# Patient Record
Sex: MALE | Race: WHITE | NOT HISPANIC OR LATINO | Employment: OTHER | URBAN - METROPOLITAN AREA
[De-identification: names, ages, dates, MRNs, and addresses within clinical notes are randomized per-mention and may not be internally consistent; named-entity substitution may affect disease eponyms.]

---

## 2017-10-07 ENCOUNTER — APPOINTMENT (OUTPATIENT)
Dept: GENERAL RADIOLOGY | Facility: HOSPITAL | Age: 57
End: 2017-10-07

## 2017-10-07 ENCOUNTER — HOSPITAL ENCOUNTER (INPATIENT)
Facility: HOSPITAL | Age: 57
LOS: 3 days | Discharge: ANOTHER HOSPITAL | End: 2017-10-11
Attending: EMERGENCY MEDICINE | Admitting: FAMILY MEDICINE

## 2017-10-07 DIAGNOSIS — M06.9 RHEUMATOID ARTHRITIS FLARE (HCC): Primary | ICD-10-CM

## 2017-10-07 DIAGNOSIS — Z74.09 IMPAIRED FUNCTIONAL MOBILITY, BALANCE, GAIT, AND ENDURANCE: ICD-10-CM

## 2017-10-07 DIAGNOSIS — Z74.09 IMPAIRED MOBILITY AND ADLS: ICD-10-CM

## 2017-10-07 DIAGNOSIS — M00.9: ICD-10-CM

## 2017-10-07 DIAGNOSIS — R00.0 TACHYCARDIA: ICD-10-CM

## 2017-10-07 DIAGNOSIS — Z78.9 IMPAIRED MOBILITY AND ADLS: ICD-10-CM

## 2017-10-07 LAB
ALBUMIN SERPL-MCNC: 3.5 G/DL (ref 3.2–4.8)
ALBUMIN/GLOB SERPL: 1 G/DL (ref 1.5–2.5)
ALP SERPL-CCNC: 115 U/L (ref 25–100)
ALT SERPL W P-5'-P-CCNC: 100 U/L (ref 7–40)
ANION GAP SERPL CALCULATED.3IONS-SCNC: 7 MMOL/L (ref 3–11)
APTT PPP: 26.1 SECONDS (ref 24–31)
AST SERPL-CCNC: 42 U/L (ref 0–33)
BASOPHILS # BLD AUTO: 0.03 10*3/MM3 (ref 0–0.2)
BASOPHILS NFR BLD AUTO: 0.6 % (ref 0–1)
BILIRUB SERPL-MCNC: 0.4 MG/DL (ref 0.3–1.2)
BUN BLD-MCNC: 14 MG/DL (ref 9–23)
BUN/CREAT SERPL: 20 (ref 7–25)
CALCIUM SPEC-SCNC: 9.6 MG/DL (ref 8.7–10.4)
CHLORIDE SERPL-SCNC: 106 MMOL/L (ref 99–109)
CO2 SERPL-SCNC: 24 MMOL/L (ref 20–31)
CREAT BLD-MCNC: 0.7 MG/DL (ref 0.6–1.3)
CRP SERPL-MCNC: 9.22 MG/DL (ref 0–1)
D-LACTATE SERPL-SCNC: 1.5 MMOL/L (ref 0.5–2)
DEPRECATED RDW RBC AUTO: 50.6 FL (ref 37–54)
EOSINOPHIL # BLD AUTO: 0.09 10*3/MM3 (ref 0–0.3)
EOSINOPHIL NFR BLD AUTO: 1.8 % (ref 0–3)
ERYTHROCYTE [DISTWIDTH] IN BLOOD BY AUTOMATED COUNT: 13.9 % (ref 11.3–14.5)
ERYTHROCYTE [SEDIMENTATION RATE] IN BLOOD: 95 MM/HR (ref 0–20)
GFR SERPL CREATININE-BSD FRML MDRD: 117 ML/MIN/1.73
GLOBULIN UR ELPH-MCNC: 3.5 GM/DL
GLUCOSE BLD-MCNC: 137 MG/DL (ref 70–100)
HCT VFR BLD AUTO: 43.3 % (ref 38.9–50.9)
HGB BLD-MCNC: 14.9 G/DL (ref 13.1–17.5)
HOLD SPECIMEN: NORMAL
HOLD SPECIMEN: NORMAL
IMM GRANULOCYTES # BLD: 0.03 10*3/MM3 (ref 0–0.03)
IMM GRANULOCYTES NFR BLD: 0.6 % (ref 0–0.6)
INR PPP: 1.1
LYMPHOCYTES # BLD AUTO: 1.52 10*3/MM3 (ref 0.6–4.8)
LYMPHOCYTES NFR BLD AUTO: 30.8 % (ref 24–44)
MCH RBC QN AUTO: 34.6 PG (ref 27–31)
MCHC RBC AUTO-ENTMCNC: 34.4 G/DL (ref 32–36)
MCV RBC AUTO: 100.5 FL (ref 80–99)
MONOCYTES # BLD AUTO: 0.71 10*3/MM3 (ref 0–1)
MONOCYTES NFR BLD AUTO: 14.4 % (ref 0–12)
NEUTROPHILS # BLD AUTO: 2.56 10*3/MM3 (ref 1.5–8.3)
NEUTROPHILS NFR BLD AUTO: 51.8 % (ref 41–71)
NRBC BLD MANUAL-RTO: 0 /100 WBC (ref 0–0)
PLATELET # BLD AUTO: 347 10*3/MM3 (ref 150–450)
PMV BLD AUTO: 9.4 FL (ref 6–12)
POTASSIUM BLD-SCNC: 3.4 MMOL/L (ref 3.5–5.5)
PROCALCITONIN SERPL-MCNC: 0.16 NG/ML
PROT SERPL-MCNC: 7 G/DL (ref 5.7–8.2)
PROTHROMBIN TIME: 12 SECONDS (ref 9.6–11.5)
RBC # BLD AUTO: 4.31 10*6/MM3 (ref 4.2–5.76)
SODIUM BLD-SCNC: 137 MMOL/L (ref 132–146)
TROPONIN I SERPL-MCNC: 0.03 NG/ML (ref 0–0.07)
URATE SERPL-MCNC: 5.5 MG/DL (ref 3.7–9.2)
WBC NRBC COR # BLD: 4.94 10*3/MM3 (ref 3.5–10.8)
WHOLE BLOOD HOLD SPECIMEN: NORMAL
WHOLE BLOOD HOLD SPECIMEN: NORMAL

## 2017-10-07 PROCEDURE — 80053 COMPREHEN METABOLIC PANEL: CPT | Performed by: NURSE PRACTITIONER

## 2017-10-07 PROCEDURE — 86901 BLOOD TYPING SEROLOGIC RH(D): CPT

## 2017-10-07 PROCEDURE — 83605 ASSAY OF LACTIC ACID: CPT | Performed by: NURSE PRACTITIONER

## 2017-10-07 PROCEDURE — 93005 ELECTROCARDIOGRAM TRACING: CPT | Performed by: NURSE PRACTITIONER

## 2017-10-07 PROCEDURE — 89051 BODY FLUID CELL COUNT: CPT | Performed by: EMERGENCY MEDICINE

## 2017-10-07 PROCEDURE — 84484 ASSAY OF TROPONIN QUANT: CPT

## 2017-10-07 PROCEDURE — 86900 BLOOD TYPING SEROLOGIC ABO: CPT

## 2017-10-07 PROCEDURE — 85730 THROMBOPLASTIN TIME PARTIAL: CPT | Performed by: NURSE PRACTITIONER

## 2017-10-07 PROCEDURE — 85652 RBC SED RATE AUTOMATED: CPT | Performed by: NURSE PRACTITIONER

## 2017-10-07 PROCEDURE — 86140 C-REACTIVE PROTEIN: CPT | Performed by: NURSE PRACTITIONER

## 2017-10-07 PROCEDURE — 99283 EMERGENCY DEPT VISIT LOW MDM: CPT

## 2017-10-07 PROCEDURE — 87040 BLOOD CULTURE FOR BACTERIA: CPT | Performed by: NURSE PRACTITIONER

## 2017-10-07 PROCEDURE — 85025 COMPLETE CBC W/AUTO DIFF WBC: CPT | Performed by: NURSE PRACTITIONER

## 2017-10-07 PROCEDURE — 84550 ASSAY OF BLOOD/URIC ACID: CPT | Performed by: NURSE PRACTITIONER

## 2017-10-07 PROCEDURE — 85610 PROTHROMBIN TIME: CPT | Performed by: NURSE PRACTITIONER

## 2017-10-07 PROCEDURE — 89060 EXAM SYNOVIAL FLUID CRYSTALS: CPT | Performed by: EMERGENCY MEDICINE

## 2017-10-07 PROCEDURE — 71010 HC CHEST PA OR AP: CPT

## 2017-10-07 PROCEDURE — 84145 PROCALCITONIN (PCT): CPT | Performed by: NURSE PRACTITIONER

## 2017-10-07 RX ORDER — ESOMEPRAZOLE MAGNESIUM 40 MG/1
40 CAPSULE, DELAYED RELEASE ORAL
COMMUNITY

## 2017-10-07 RX ORDER — SALSALATE 750 MG
750 TABLET ORAL 2 TIMES DAILY
COMMUNITY
End: 2017-10-11 | Stop reason: HOSPADM

## 2017-10-07 RX ORDER — LORATADINE 10 MG/1
10 TABLET ORAL DAILY
COMMUNITY

## 2017-10-07 RX ORDER — AMITRIPTYLINE HYDROCHLORIDE 75 MG/1
TABLET, FILM COATED ORAL NIGHTLY
COMMUNITY

## 2017-10-07 RX ORDER — ATORVASTATIN CALCIUM 40 MG/1
40 TABLET, FILM COATED ORAL DAILY
COMMUNITY

## 2017-10-07 RX ORDER — VANCOMYCIN 2 GRAM/500 ML IN 0.9 % SODIUM CHLORIDE INTRAVENOUS
20 ONCE
Status: COMPLETED | OUTPATIENT
Start: 2017-10-07 | End: 2017-10-08

## 2017-10-07 RX ORDER — RANITIDINE 150 MG/1
150 TABLET ORAL 2 TIMES DAILY
COMMUNITY

## 2017-10-07 RX ORDER — DILTIAZEM HYDROCHLORIDE 120 MG/1
120 CAPSULE, COATED, EXTENDED RELEASE ORAL DAILY
COMMUNITY

## 2017-10-07 RX ORDER — CLOPIDOGREL BISULFATE 75 MG/1
75 TABLET ORAL DAILY
COMMUNITY
End: 2017-10-11 | Stop reason: HOSPADM

## 2017-10-07 RX ORDER — TRAMADOL HYDROCHLORIDE 50 MG/1
50 TABLET ORAL EVERY 6 HOURS PRN
COMMUNITY
End: 2017-10-11 | Stop reason: HOSPADM

## 2017-10-07 RX ORDER — PREDNISONE 1 MG/1
5 TABLET ORAL DAILY
COMMUNITY
End: 2017-10-11 | Stop reason: HOSPADM

## 2017-10-07 RX ORDER — CARVEDILOL PHOSPHATE 40 MG/1
40 CAPSULE, EXTENDED RELEASE ORAL DAILY
COMMUNITY
End: 2017-10-11 | Stop reason: HOSPADM

## 2017-10-07 RX ORDER — SODIUM CHLORIDE 0.9 % (FLUSH) 0.9 %
10 SYRINGE (ML) INJECTION AS NEEDED
Status: DISCONTINUED | OUTPATIENT
Start: 2017-10-07 | End: 2017-10-11 | Stop reason: HOSPADM

## 2017-10-07 RX ORDER — CITALOPRAM 20 MG/1
20 TABLET ORAL DAILY
COMMUNITY

## 2017-10-07 RX ORDER — COLCHICINE 0.6 MG/1
0.6 TABLET ORAL DAILY
COMMUNITY
End: 2017-10-11 | Stop reason: HOSPADM

## 2017-10-07 RX ADMIN — SODIUM CHLORIDE 1000 ML: 9 INJECTION, SOLUTION INTRAVENOUS at 22:07

## 2017-10-08 ENCOUNTER — ANESTHESIA (OUTPATIENT)
Dept: PERIOP | Facility: HOSPITAL | Age: 57
End: 2017-10-08

## 2017-10-08 ENCOUNTER — APPOINTMENT (OUTPATIENT)
Dept: GENERAL RADIOLOGY | Facility: HOSPITAL | Age: 57
End: 2017-10-08

## 2017-10-08 ENCOUNTER — ANESTHESIA EVENT (OUTPATIENT)
Dept: PERIOP | Facility: HOSPITAL | Age: 57
End: 2017-10-08

## 2017-10-08 PROBLEM — R41.82 ALTERED MENTAL STATUS: Status: ACTIVE | Noted: 2017-10-08

## 2017-10-08 PROBLEM — M00.9 PYOGENIC ARTHRITIS OF MULTIPLE SITES (HCC): Status: ACTIVE | Noted: 2017-10-07

## 2017-10-08 PROBLEM — I10 HYPERTENSION: Chronic | Status: ACTIVE | Noted: 2017-10-08

## 2017-10-08 PROBLEM — R53.1 WEAKNESS: Status: ACTIVE | Noted: 2017-10-08

## 2017-10-08 PROBLEM — R00.0 TACHYCARDIA: Status: ACTIVE | Noted: 2017-10-08

## 2017-10-08 PROBLEM — L53.9: Status: ACTIVE | Noted: 2017-10-08

## 2017-10-08 PROBLEM — M06.9 RHEUMATOID ARTHRITIS FLARE (HCC): Status: ACTIVE | Noted: 2017-10-08

## 2017-10-08 PROBLEM — I25.10 CAD (CORONARY ARTERY DISEASE): Chronic | Status: ACTIVE | Noted: 2017-10-08

## 2017-10-08 PROBLEM — L53.9 ERYTHEMATOUS CONDITION: Status: ACTIVE | Noted: 2017-10-08

## 2017-10-08 PROBLEM — Z74.09 IMMOBILITY: Status: ACTIVE | Noted: 2017-10-08

## 2017-10-08 PROBLEM — M25.50 JOINT PAIN: Status: ACTIVE | Noted: 2017-10-08

## 2017-10-08 PROBLEM — E78.5 HLD (HYPERLIPIDEMIA): Chronic | Status: ACTIVE | Noted: 2017-10-08

## 2017-10-08 PROBLEM — M06.9 RHEUMATOID ARTHRITIS (HCC): Chronic | Status: ACTIVE | Noted: 2017-10-08

## 2017-10-08 PROBLEM — M10.9 GOUT: Chronic | Status: ACTIVE | Noted: 2017-10-08

## 2017-10-08 LAB
ABO GROUP BLD: NORMAL
ABO GROUP BLD: NORMAL
ALBUMIN SERPL-MCNC: 3.1 G/DL (ref 3.2–4.8)
ALBUMIN/GLOB SERPL: 1.1 G/DL (ref 1.5–2.5)
ALP SERPL-CCNC: 93 U/L (ref 25–100)
ALT SERPL W P-5'-P-CCNC: 92 U/L (ref 7–40)
ANION GAP SERPL CALCULATED.3IONS-SCNC: 10 MMOL/L (ref 3–11)
ANION GAP SERPL CALCULATED.3IONS-SCNC: 4 MMOL/L (ref 3–11)
APPEARANCE FLD: ABNORMAL
AST SERPL-CCNC: 40 U/L (ref 0–33)
BASOPHILS # BLD AUTO: 0.02 10*3/MM3 (ref 0–0.2)
BASOPHILS NFR BLD AUTO: 0.4 % (ref 0–1)
BILIRUB SERPL-MCNC: 0.4 MG/DL (ref 0.3–1.2)
BLD GP AB SCN SERPL QL: NEGATIVE
BNP SERPL-MCNC: 5 PG/ML (ref 0–100)
BUN BLD-MCNC: 15 MG/DL (ref 9–23)
BUN BLD-MCNC: 16 MG/DL (ref 9–23)
BUN/CREAT SERPL: 21.4 (ref 7–25)
BUN/CREAT SERPL: 22.9 (ref 7–25)
CALCIUM SPEC-SCNC: 8.5 MG/DL (ref 8.7–10.4)
CALCIUM SPEC-SCNC: 9.1 MG/DL (ref 8.7–10.4)
CHLORIDE SERPL-SCNC: 104 MMOL/L (ref 99–109)
CHLORIDE SERPL-SCNC: 109 MMOL/L (ref 99–109)
CO2 SERPL-SCNC: 23 MMOL/L (ref 20–31)
CO2 SERPL-SCNC: 25 MMOL/L (ref 20–31)
COLOR FLD: ABNORMAL
COLOR FLD: ABNORMAL
COLOR FLD: YELLOW
CREAT BLD-MCNC: 0.7 MG/DL (ref 0.6–1.3)
CREAT BLD-MCNC: 0.7 MG/DL (ref 0.6–1.3)
CRYSTALS FLD MICRO: NORMAL
D-LACTATE SERPL-SCNC: 0.9 MMOL/L (ref 0.5–2)
DEPRECATED RDW RBC AUTO: 51.3 FL (ref 37–54)
EOSINOPHIL # BLD AUTO: 0.14 10*3/MM3 (ref 0–0.3)
EOSINOPHIL NFR BLD AUTO: 2.8 % (ref 0–3)
ERYTHROCYTE [DISTWIDTH] IN BLOOD BY AUTOMATED COUNT: 14 % (ref 11.3–14.5)
GFR SERPL CREATININE-BSD FRML MDRD: 117 ML/MIN/1.73
GFR SERPL CREATININE-BSD FRML MDRD: 117 ML/MIN/1.73
GLOBULIN UR ELPH-MCNC: 2.7 GM/DL
GLUCOSE BLD-MCNC: 101 MG/DL (ref 70–100)
GLUCOSE BLD-MCNC: 86 MG/DL (ref 70–100)
HBA1C MFR BLD: 5.8 % (ref 4.8–5.6)
HCT VFR BLD AUTO: 36.7 % (ref 38.9–50.9)
HCT VFR BLD AUTO: 38 % (ref 38.9–50.9)
HGB BLD-MCNC: 12.3 G/DL (ref 13.1–17.5)
HGB BLD-MCNC: 12.8 G/DL (ref 13.1–17.5)
IMM GRANULOCYTES # BLD: 0.03 10*3/MM3 (ref 0–0.03)
IMM GRANULOCYTES NFR BLD: 0.6 % (ref 0–0.6)
LYMPHOCYTES # BLD AUTO: 1.52 10*3/MM3 (ref 0.6–4.8)
LYMPHOCYTES NFR BLD AUTO: 30.5 % (ref 24–44)
LYMPHOCYTES NFR FLD MANUAL: 1 %
MACROPHAGE FLUID: 1 %
MAGNESIUM SERPL-MCNC: 1.7 MG/DL (ref 1.3–2.7)
MCH RBC QN AUTO: 34.2 PG (ref 27–31)
MCHC RBC AUTO-ENTMCNC: 33.7 G/DL (ref 32–36)
MCV RBC AUTO: 101.6 FL (ref 80–99)
MESOTHL CELL NFR FLD MANUAL: 1 %
MONOCYTES # BLD AUTO: 0.79 10*3/MM3 (ref 0–1)
MONOCYTES NFR BLD AUTO: 15.8 % (ref 0–12)
MONOCYTES NFR FLD: 3 %
MONOCYTES NFR FLD: 3 %
MONOCYTES NFR FLD: 5 %
NEUTROPHILS # BLD AUTO: 2.49 10*3/MM3 (ref 1.5–8.3)
NEUTROPHILS NFR BLD AUTO: 49.9 % (ref 41–71)
NEUTROPHILS NFR FLD MANUAL: 94 %
NEUTROPHILS NFR FLD MANUAL: 95 %
NEUTROPHILS NFR FLD MANUAL: 95 %
PLATELET # BLD AUTO: 308 10*3/MM3 (ref 150–450)
PMV BLD AUTO: 9.5 FL (ref 6–12)
POTASSIUM BLD-SCNC: 3.1 MMOL/L (ref 3.5–5.5)
POTASSIUM BLD-SCNC: 3.6 MMOL/L (ref 3.5–5.5)
PROCALCITONIN SERPL-MCNC: 0.11 NG/ML
PROT SERPL-MCNC: 5.8 G/DL (ref 5.7–8.2)
RBC # BLD AUTO: 3.74 10*6/MM3 (ref 4.2–5.76)
RBC # FLD AUTO: ABNORMAL /MM3
RH BLD: POSITIVE
RH BLD: POSITIVE
SODIUM BLD-SCNC: 137 MMOL/L (ref 132–146)
SODIUM BLD-SCNC: 138 MMOL/L (ref 132–146)
T4 FREE SERPL-MCNC: 1.15 NG/DL (ref 0.89–1.76)
TSH SERPL DL<=0.05 MIU/L-ACNC: 2.63 MIU/ML (ref 0.35–5.35)
WBC # FLD: ABNORMAL /MM3
WBC NRBC COR # BLD: 4.99 10*3/MM3 (ref 3.5–10.8)

## 2017-10-08 PROCEDURE — 25010000002 SUCCINYLCHOLINE PER 20 MG: Performed by: ANESTHESIOLOGY

## 2017-10-08 PROCEDURE — 87205 SMEAR GRAM STAIN: CPT | Performed by: EMERGENCY MEDICINE

## 2017-10-08 PROCEDURE — 27310 EXPLORATION OF KNEE JOINT: CPT | Performed by: ORTHOPAEDIC SURGERY

## 2017-10-08 PROCEDURE — 25010000002 KETOROLAC TROMETHAMINE PER 15 MG: Performed by: NURSE PRACTITIONER

## 2017-10-08 PROCEDURE — 0RNN0ZZ RELEASE RIGHT WRIST JOINT, OPEN APPROACH: ICD-10-PCS | Performed by: ORTHOPAEDIC SURGERY

## 2017-10-08 PROCEDURE — 25040 ARTHRT RDCRPL/MIDCARPL JT: CPT | Performed by: ORTHOPAEDIC SURGERY

## 2017-10-08 PROCEDURE — 83880 ASSAY OF NATRIURETIC PEPTIDE: CPT | Performed by: NURSE PRACTITIONER

## 2017-10-08 PROCEDURE — 83036 HEMOGLOBIN GLYCOSYLATED A1C: CPT | Performed by: NURSE PRACTITIONER

## 2017-10-08 PROCEDURE — 73560 X-RAY EXAM OF KNEE 1 OR 2: CPT

## 2017-10-08 PROCEDURE — 83735 ASSAY OF MAGNESIUM: CPT | Performed by: NURSE PRACTITIONER

## 2017-10-08 PROCEDURE — 87147 CULTURE TYPE IMMUNOLOGIC: CPT | Performed by: EMERGENCY MEDICINE

## 2017-10-08 PROCEDURE — 85025 COMPLETE CBC W/AUTO DIFF WBC: CPT | Performed by: NURSE PRACTITIONER

## 2017-10-08 PROCEDURE — 86850 RBC ANTIBODY SCREEN: CPT | Performed by: ORTHOPAEDIC SURGERY

## 2017-10-08 PROCEDURE — 84145 PROCALCITONIN (PCT): CPT | Performed by: NURSE PRACTITIONER

## 2017-10-08 PROCEDURE — 87070 CULTURE OTHR SPECIMN AEROBIC: CPT | Performed by: EMERGENCY MEDICINE

## 2017-10-08 PROCEDURE — 80053 COMPREHEN METABOLIC PANEL: CPT | Performed by: NURSE PRACTITIONER

## 2017-10-08 PROCEDURE — 73110 X-RAY EXAM OF WRIST: CPT

## 2017-10-08 PROCEDURE — 94799 UNLISTED PULMONARY SVC/PX: CPT

## 2017-10-08 PROCEDURE — 25010000002 NEOSTIGMINE PER 0.5 MG: Performed by: ANESTHESIOLOGY

## 2017-10-08 PROCEDURE — 84481 FREE ASSAY (FT-3): CPT | Performed by: NURSE PRACTITIONER

## 2017-10-08 PROCEDURE — 87015 SPECIMEN INFECT AGNT CONCNTJ: CPT | Performed by: EMERGENCY MEDICINE

## 2017-10-08 PROCEDURE — 25010000002 VANCOMYCIN HCL IN NACL 2-0.9 GM/500ML-% SOLUTION: Performed by: NURSE PRACTITIONER

## 2017-10-08 PROCEDURE — 85018 HEMOGLOBIN: CPT | Performed by: ORTHOPAEDIC SURGERY

## 2017-10-08 PROCEDURE — 25010000002 FENTANYL CITRATE (PF) 100 MCG/2ML SOLUTION: Performed by: ANESTHESIOLOGY

## 2017-10-08 PROCEDURE — 86900 BLOOD TYPING SEROLOGIC ABO: CPT | Performed by: ORTHOPAEDIC SURGERY

## 2017-10-08 PROCEDURE — 71010 HC CHEST PA OR AP: CPT

## 2017-10-08 PROCEDURE — 87186 SC STD MICRODIL/AGAR DIL: CPT | Performed by: EMERGENCY MEDICINE

## 2017-10-08 PROCEDURE — 25010000002 PROPOFOL 10 MG/ML EMULSION: Performed by: ANESTHESIOLOGY

## 2017-10-08 PROCEDURE — 84443 ASSAY THYROID STIM HORMONE: CPT | Performed by: NURSE PRACTITIONER

## 2017-10-08 PROCEDURE — 85014 HEMATOCRIT: CPT | Performed by: ORTHOPAEDIC SURGERY

## 2017-10-08 PROCEDURE — 25010000002 ONDANSETRON PER 1 MG: Performed by: ANESTHESIOLOGY

## 2017-10-08 PROCEDURE — 83605 ASSAY OF LACTIC ACID: CPT | Performed by: NURSE PRACTITIONER

## 2017-10-08 PROCEDURE — 25010000002 PIPERACILLIN-TAZOBACTAM: Performed by: NURSE PRACTITIONER

## 2017-10-08 PROCEDURE — C1751 CATH, INF, PER/CENT/MIDLINE: HCPCS | Performed by: ANESTHESIOLOGY

## 2017-10-08 PROCEDURE — 20610 DRAIN/INJ JOINT/BURSA W/O US: CPT | Performed by: ORTHOPAEDIC SURGERY

## 2017-10-08 PROCEDURE — 0RNP0ZZ RELEASE LEFT WRIST JOINT, OPEN APPROACH: ICD-10-PCS | Performed by: ORTHOPAEDIC SURGERY

## 2017-10-08 PROCEDURE — 84439 ASSAY OF FREE THYROXINE: CPT | Performed by: NURSE PRACTITIONER

## 2017-10-08 PROCEDURE — 87077 CULTURE AEROBIC IDENTIFY: CPT | Performed by: EMERGENCY MEDICINE

## 2017-10-08 PROCEDURE — 87185 SC STD ENZYME DETCJ PER NZM: CPT | Performed by: EMERGENCY MEDICINE

## 2017-10-08 PROCEDURE — 86901 BLOOD TYPING SEROLOGIC RH(D): CPT | Performed by: ORTHOPAEDIC SURGERY

## 2017-10-08 PROCEDURE — 0SBD0ZZ EXCISION OF LEFT KNEE JOINT, OPEN APPROACH: ICD-10-PCS | Performed by: ORTHOPAEDIC SURGERY

## 2017-10-08 PROCEDURE — 99223 1ST HOSP IP/OBS HIGH 75: CPT | Performed by: FAMILY MEDICINE

## 2017-10-08 PROCEDURE — 20605 DRAIN/INJ JOINT/BURSA W/O US: CPT | Performed by: ORTHOPAEDIC SURGERY

## 2017-10-08 PROCEDURE — 99221 1ST HOSP IP/OBS SF/LOW 40: CPT | Performed by: ORTHOPAEDIC SURGERY

## 2017-10-08 RX ORDER — DILTIAZEM HYDROCHLORIDE 120 MG/1
120 CAPSULE, COATED, EXTENDED RELEASE ORAL DAILY
Status: DISCONTINUED | OUTPATIENT
Start: 2017-10-08 | End: 2017-10-11 | Stop reason: HOSPADM

## 2017-10-08 RX ORDER — NALOXONE HCL 0.4 MG/ML
0.1 VIAL (ML) INJECTION
Status: DISCONTINUED | OUTPATIENT
Start: 2017-10-08 | End: 2017-10-11

## 2017-10-08 RX ORDER — SODIUM CHLORIDE, SODIUM LACTATE, POTASSIUM CHLORIDE, CALCIUM CHLORIDE 600; 310; 30; 20 MG/100ML; MG/100ML; MG/100ML; MG/100ML
INJECTION, SOLUTION INTRAVENOUS CONTINUOUS PRN
Status: DISCONTINUED | OUTPATIENT
Start: 2017-10-08 | End: 2017-10-08 | Stop reason: SURG

## 2017-10-08 RX ORDER — MAGNESIUM SULFATE HEPTAHYDRATE 40 MG/ML
2 INJECTION, SOLUTION INTRAVENOUS AS NEEDED
Status: DISCONTINUED | OUTPATIENT
Start: 2017-10-08 | End: 2017-10-11 | Stop reason: HOSPADM

## 2017-10-08 RX ORDER — ONDANSETRON 2 MG/ML
4 INJECTION INTRAMUSCULAR; INTRAVENOUS EVERY 6 HOURS PRN
Status: DISCONTINUED | OUTPATIENT
Start: 2017-10-08 | End: 2017-10-11 | Stop reason: SDUPTHER

## 2017-10-08 RX ORDER — CETIRIZINE HYDROCHLORIDE 10 MG/1
10 TABLET ORAL NIGHTLY
Status: DISCONTINUED | OUTPATIENT
Start: 2017-10-08 | End: 2017-10-11 | Stop reason: HOSPADM

## 2017-10-08 RX ORDER — HYDROMORPHONE HYDROCHLORIDE 1 MG/ML
0.5 INJECTION, SOLUTION INTRAMUSCULAR; INTRAVENOUS; SUBCUTANEOUS
Status: DISCONTINUED | OUTPATIENT
Start: 2017-10-08 | End: 2017-10-11

## 2017-10-08 RX ORDER — VANCOMYCIN 2 GRAM/500 ML IN 0.9 % SODIUM CHLORIDE INTRAVENOUS
20 EVERY 12 HOURS
Status: DISCONTINUED | OUTPATIENT
Start: 2017-10-08 | End: 2017-10-08 | Stop reason: DRUGHIGH

## 2017-10-08 RX ORDER — CLOPIDOGREL BISULFATE 75 MG/1
75 TABLET ORAL DAILY
Status: DISCONTINUED | OUTPATIENT
Start: 2017-10-08 | End: 2017-10-11 | Stop reason: HOSPADM

## 2017-10-08 RX ORDER — OXYCODONE HYDROCHLORIDE AND ACETAMINOPHEN 5; 325 MG/1; MG/1
2 TABLET ORAL EVERY 4 HOURS PRN
Status: DISCONTINUED | OUTPATIENT
Start: 2017-10-08 | End: 2017-10-11 | Stop reason: SDUPTHER

## 2017-10-08 RX ORDER — ESMOLOL HYDROCHLORIDE 10 MG/ML
INJECTION INTRAVENOUS AS NEEDED
Status: DISCONTINUED | OUTPATIENT
Start: 2017-10-08 | End: 2017-10-08 | Stop reason: SURG

## 2017-10-08 RX ORDER — ONDANSETRON 2 MG/ML
INJECTION INTRAMUSCULAR; INTRAVENOUS AS NEEDED
Status: DISCONTINUED | OUTPATIENT
Start: 2017-10-08 | End: 2017-10-08 | Stop reason: SURG

## 2017-10-08 RX ORDER — ONDANSETRON 4 MG/1
4 TABLET, FILM COATED ORAL EVERY 6 HOURS PRN
Status: DISCONTINUED | OUTPATIENT
Start: 2017-10-08 | End: 2017-10-11 | Stop reason: HOSPADM

## 2017-10-08 RX ORDER — POTASSIUM CHLORIDE 7.45 MG/ML
10 INJECTION INTRAVENOUS
Status: DISCONTINUED | OUTPATIENT
Start: 2017-10-08 | End: 2017-10-11 | Stop reason: HOSPADM

## 2017-10-08 RX ORDER — COLCHICINE 0.6 MG/1
0.6 TABLET ORAL DAILY
Status: DISCONTINUED | OUTPATIENT
Start: 2017-10-08 | End: 2017-10-11 | Stop reason: HOSPADM

## 2017-10-08 RX ORDER — SODIUM CHLORIDE 9 MG/ML
100 INJECTION, SOLUTION INTRAVENOUS CONTINUOUS
Status: DISCONTINUED | OUTPATIENT
Start: 2017-10-08 | End: 2017-10-10

## 2017-10-08 RX ORDER — HYDROMORPHONE HYDROCHLORIDE 1 MG/ML
0.5 INJECTION, SOLUTION INTRAMUSCULAR; INTRAVENOUS; SUBCUTANEOUS
Status: DISCONTINUED | OUTPATIENT
Start: 2017-10-08 | End: 2017-10-08 | Stop reason: HOSPADM

## 2017-10-08 RX ORDER — ATRACURIUM BESYLATE 10 MG/ML
INJECTION, SOLUTION INTRAVENOUS AS NEEDED
Status: DISCONTINUED | OUTPATIENT
Start: 2017-10-08 | End: 2017-10-08 | Stop reason: SURG

## 2017-10-08 RX ORDER — HEPARIN SODIUM 5000 [USP'U]/ML
5000 INJECTION, SOLUTION INTRAVENOUS; SUBCUTANEOUS EVERY 12 HOURS SCHEDULED
Status: DISCONTINUED | OUTPATIENT
Start: 2017-10-08 | End: 2017-10-08

## 2017-10-08 RX ORDER — ASPIRIN 81 MG/1
81 TABLET, CHEWABLE ORAL DAILY
Status: DISCONTINUED | OUTPATIENT
Start: 2017-10-08 | End: 2017-10-11 | Stop reason: HOSPADM

## 2017-10-08 RX ORDER — FENTANYL CITRATE 50 UG/ML
INJECTION, SOLUTION INTRAMUSCULAR; INTRAVENOUS AS NEEDED
Status: DISCONTINUED | OUTPATIENT
Start: 2017-10-08 | End: 2017-10-08 | Stop reason: SURG

## 2017-10-08 RX ORDER — DOCUSATE SODIUM 100 MG/1
1 CAPSULE, LIQUID FILLED ORAL DAILY
COMMUNITY
Start: 2014-06-04 | End: 2017-10-11 | Stop reason: HOSPADM

## 2017-10-08 RX ORDER — SODIUM CHLORIDE 0.9 % (FLUSH) 0.9 %
1-10 SYRINGE (ML) INJECTION AS NEEDED
Status: DISCONTINUED | OUTPATIENT
Start: 2017-10-08 | End: 2017-10-11 | Stop reason: HOSPADM

## 2017-10-08 RX ORDER — DOCUSATE SODIUM 100 MG/1
100 CAPSULE, LIQUID FILLED ORAL 2 TIMES DAILY PRN
Status: DISCONTINUED | OUTPATIENT
Start: 2017-10-08 | End: 2017-10-11 | Stop reason: HOSPADM

## 2017-10-08 RX ORDER — BISACODYL 5 MG/1
10 TABLET, DELAYED RELEASE ORAL DAILY PRN
Status: DISCONTINUED | OUTPATIENT
Start: 2017-10-08 | End: 2017-10-11 | Stop reason: HOSPADM

## 2017-10-08 RX ORDER — KETOROLAC TROMETHAMINE 15 MG/ML
15 INJECTION, SOLUTION INTRAMUSCULAR; INTRAVENOUS EVERY 6 HOURS
Status: DISCONTINUED | OUTPATIENT
Start: 2017-10-08 | End: 2017-10-11 | Stop reason: HOSPADM

## 2017-10-08 RX ORDER — MAGNESIUM SULFATE HEPTAHYDRATE 40 MG/ML
4 INJECTION, SOLUTION INTRAVENOUS AS NEEDED
Status: DISCONTINUED | OUTPATIENT
Start: 2017-10-08 | End: 2017-10-11 | Stop reason: HOSPADM

## 2017-10-08 RX ORDER — SODIUM CHLORIDE 9 MG/ML
100 INJECTION, SOLUTION INTRAVENOUS CONTINUOUS
Status: DISCONTINUED | OUTPATIENT
Start: 2017-10-08 | End: 2017-10-11 | Stop reason: HOSPADM

## 2017-10-08 RX ORDER — FENTANYL CITRATE 50 UG/ML
50 INJECTION, SOLUTION INTRAMUSCULAR; INTRAVENOUS
Status: DISCONTINUED | OUTPATIENT
Start: 2017-10-08 | End: 2017-10-08 | Stop reason: HOSPADM

## 2017-10-08 RX ORDER — PROPOFOL 10 MG/ML
VIAL (ML) INTRAVENOUS AS NEEDED
Status: DISCONTINUED | OUTPATIENT
Start: 2017-10-08 | End: 2017-10-08 | Stop reason: SURG

## 2017-10-08 RX ORDER — VANCOMYCIN/0.9 % SOD CHLORIDE 1.5G/250ML
15 PLASTIC BAG, INJECTION (ML) INTRAVENOUS ONCE
Status: DISCONTINUED | OUTPATIENT
Start: 2017-10-09 | End: 2017-10-08 | Stop reason: SDUPTHER

## 2017-10-08 RX ORDER — LIDOCAINE HYDROCHLORIDE 10 MG/ML
INJECTION, SOLUTION INFILTRATION; PERINEURAL AS NEEDED
Status: DISCONTINUED | OUTPATIENT
Start: 2017-10-08 | End: 2017-10-08 | Stop reason: SURG

## 2017-10-08 RX ORDER — CITALOPRAM 20 MG/1
20 TABLET ORAL DAILY
Status: DISCONTINUED | OUTPATIENT
Start: 2017-10-08 | End: 2017-10-11 | Stop reason: HOSPADM

## 2017-10-08 RX ORDER — MAGNESIUM HYDROXIDE 1200 MG/15ML
LIQUID ORAL AS NEEDED
Status: DISCONTINUED | OUTPATIENT
Start: 2017-10-08 | End: 2017-10-08 | Stop reason: HOSPADM

## 2017-10-08 RX ORDER — VANCOMYCIN/0.9 % SOD CHLORIDE 1.5G/250ML
15 PLASTIC BAG, INJECTION (ML) INTRAVENOUS EVERY 12 HOURS
Status: DISCONTINUED | OUTPATIENT
Start: 2017-10-08 | End: 2017-10-11

## 2017-10-08 RX ORDER — CEFAZOLIN SODIUM 2 G/100ML
2 INJECTION, SOLUTION INTRAVENOUS ONCE
Status: COMPLETED | OUTPATIENT
Start: 2017-10-09 | End: 2017-10-09

## 2017-10-08 RX ORDER — PANTOPRAZOLE SODIUM 40 MG/1
40 TABLET, DELAYED RELEASE ORAL
Status: DISCONTINUED | OUTPATIENT
Start: 2017-10-08 | End: 2017-10-11 | Stop reason: HOSPADM

## 2017-10-08 RX ORDER — TRAMADOL HYDROCHLORIDE 50 MG/1
50 TABLET ORAL 4 TIMES DAILY PRN
Status: DISCONTINUED | OUTPATIENT
Start: 2017-10-08 | End: 2017-10-11 | Stop reason: HOSPADM

## 2017-10-08 RX ORDER — POTASSIUM CHLORIDE 1.5 G/1.77G
40 POWDER, FOR SOLUTION ORAL AS NEEDED
Status: DISCONTINUED | OUTPATIENT
Start: 2017-10-08 | End: 2017-10-11 | Stop reason: HOSPADM

## 2017-10-08 RX ORDER — FAMOTIDINE 20 MG/1
20 TABLET, FILM COATED ORAL 2 TIMES DAILY
Status: DISCONTINUED | OUTPATIENT
Start: 2017-10-08 | End: 2017-10-11 | Stop reason: HOSPADM

## 2017-10-08 RX ORDER — BISACODYL 10 MG
10 SUPPOSITORY, RECTAL RECTAL DAILY PRN
Status: DISCONTINUED | OUTPATIENT
Start: 2017-10-08 | End: 2017-10-11 | Stop reason: HOSPADM

## 2017-10-08 RX ORDER — ONDANSETRON 2 MG/ML
4 INJECTION INTRAMUSCULAR; INTRAVENOUS EVERY 6 HOURS PRN
Status: DISCONTINUED | OUTPATIENT
Start: 2017-10-08 | End: 2017-10-11 | Stop reason: HOSPADM

## 2017-10-08 RX ORDER — OXYCODONE HYDROCHLORIDE 5 MG/1
10 TABLET ORAL EVERY 4 HOURS PRN
Status: DISCONTINUED | OUTPATIENT
Start: 2017-10-08 | End: 2017-10-11 | Stop reason: HOSPADM

## 2017-10-08 RX ORDER — CARVEDILOL 12.5 MG/1
12.5 TABLET ORAL EVERY 12 HOURS SCHEDULED
Status: DISCONTINUED | OUTPATIENT
Start: 2017-10-08 | End: 2017-10-11 | Stop reason: HOSPADM

## 2017-10-08 RX ORDER — POTASSIUM CHLORIDE 750 MG/1
40 CAPSULE, EXTENDED RELEASE ORAL AS NEEDED
Status: DISCONTINUED | OUTPATIENT
Start: 2017-10-08 | End: 2017-10-11 | Stop reason: HOSPADM

## 2017-10-08 RX ORDER — ATORVASTATIN CALCIUM 40 MG/1
40 TABLET, FILM COATED ORAL NIGHTLY
Status: DISCONTINUED | OUTPATIENT
Start: 2017-10-08 | End: 2017-10-11 | Stop reason: HOSPADM

## 2017-10-08 RX ORDER — DOCUSATE SODIUM 100 MG/1
100 CAPSULE, LIQUID FILLED ORAL DAILY
Status: DISCONTINUED | OUTPATIENT
Start: 2017-10-08 | End: 2017-10-11 | Stop reason: HOSPADM

## 2017-10-08 RX ORDER — ACETAMINOPHEN 500 MG
500 TABLET ORAL 3 TIMES DAILY PRN
Status: DISCONTINUED | OUTPATIENT
Start: 2017-10-08 | End: 2017-10-11

## 2017-10-08 RX ORDER — GLYCOPYRROLATE 0.2 MG/ML
INJECTION INTRAMUSCULAR; INTRAVENOUS AS NEEDED
Status: DISCONTINUED | OUTPATIENT
Start: 2017-10-08 | End: 2017-10-08 | Stop reason: SURG

## 2017-10-08 RX ORDER — SUCCINYLCHOLINE CHLORIDE 20 MG/ML
INJECTION INTRAMUSCULAR; INTRAVENOUS AS NEEDED
Status: DISCONTINUED | OUTPATIENT
Start: 2017-10-08 | End: 2017-10-08 | Stop reason: SURG

## 2017-10-08 RX ORDER — ACETAMINOPHEN 325 MG/1
650 TABLET ORAL EVERY 4 HOURS PRN
Status: DISCONTINUED | OUTPATIENT
Start: 2017-10-08 | End: 2017-10-11 | Stop reason: HOSPADM

## 2017-10-08 RX ORDER — OXYCODONE HYDROCHLORIDE AND ACETAMINOPHEN 5; 325 MG/1; MG/1
1 TABLET ORAL EVERY 4 HOURS PRN
Status: DISCONTINUED | OUTPATIENT
Start: 2017-10-08 | End: 2017-10-11 | Stop reason: SDUPTHER

## 2017-10-08 RX ADMIN — ESMOLOL HYDROCHLORIDE 30 MG: 10 INJECTION, SOLUTION INTRAVENOUS at 10:59

## 2017-10-08 RX ADMIN — ATRACURIUM BESYLATE 10 MG: 10 INJECTION, SOLUTION INTRAVENOUS at 12:20

## 2017-10-08 RX ADMIN — POTASSIUM CHLORIDE 40 MEQ: 750 CAPSULE, EXTENDED RELEASE ORAL at 06:21

## 2017-10-08 RX ADMIN — ATRACURIUM BESYLATE 10 MG: 10 INJECTION, SOLUTION INTRAVENOUS at 10:25

## 2017-10-08 RX ADMIN — GLYCOPYRROLATE 0.3 MG: 0.2 INJECTION, SOLUTION INTRAMUSCULAR; INTRAVENOUS at 12:37

## 2017-10-08 RX ADMIN — ATORVASTATIN CALCIUM 40 MG: 40 TABLET, FILM COATED ORAL at 20:44

## 2017-10-08 RX ADMIN — FENTANYL CITRATE 100 MCG: 50 INJECTION, SOLUTION INTRAMUSCULAR; INTRAVENOUS at 10:25

## 2017-10-08 RX ADMIN — CLOPIDOGREL BISULFATE 75 MG: 75 TABLET ORAL at 08:43

## 2017-10-08 RX ADMIN — Medication 2 MG: at 12:37

## 2017-10-08 RX ADMIN — LIDOCAINE HYDROCHLORIDE 50 MG: 10 INJECTION, SOLUTION INFILTRATION; PERINEURAL at 10:25

## 2017-10-08 RX ADMIN — TAZOBACTAM SODIUM AND PIPERACILLIN SODIUM 4.5 G: .5; 4 INJECTION, POWDER, LYOPHILIZED, FOR SOLUTION INTRAVENOUS at 08:44

## 2017-10-08 RX ADMIN — PROPOFOL 200 MG: 10 INJECTION, EMULSION INTRAVENOUS at 10:25

## 2017-10-08 RX ADMIN — OXYCODONE HYDROCHLORIDE 10 MG: 5 TABLET ORAL at 16:20

## 2017-10-08 RX ADMIN — COLCHICINE 0.6 MG: 0.6 TABLET, FILM COATED ORAL at 08:43

## 2017-10-08 RX ADMIN — FENTANYL CITRATE 50 MCG: 50 INJECTION, SOLUTION INTRAMUSCULAR; INTRAVENOUS at 13:41

## 2017-10-08 RX ADMIN — FAMOTIDINE 20 MG: 20 TABLET ORAL at 08:43

## 2017-10-08 RX ADMIN — OXYCODONE HYDROCHLORIDE 10 MG: 5 TABLET ORAL at 20:44

## 2017-10-08 RX ADMIN — TAZOBACTAM SODIUM AND PIPERACILLIN SODIUM 4.5 G: .5; 4 INJECTION, POWDER, LYOPHILIZED, FOR SOLUTION INTRAVENOUS at 16:00

## 2017-10-08 RX ADMIN — KETOROLAC TROMETHAMINE 15 MG: 15 INJECTION, SOLUTION INTRAMUSCULAR; INTRAVENOUS at 06:21

## 2017-10-08 RX ADMIN — CARVEDILOL 12.5 MG: 12.5 TABLET, FILM COATED ORAL at 08:43

## 2017-10-08 RX ADMIN — ATRACURIUM BESYLATE 10 MG: 10 INJECTION, SOLUTION INTRAVENOUS at 10:37

## 2017-10-08 RX ADMIN — CARVEDILOL 12.5 MG: 12.5 TABLET, FILM COATED ORAL at 20:44

## 2017-10-08 RX ADMIN — MAGNESIUM SULFATE HEPTAHYDRATE 4 G: 40 INJECTION, SOLUTION INTRAVENOUS at 14:31

## 2017-10-08 RX ADMIN — SODIUM CHLORIDE: 9 INJECTION, SOLUTION INTRAVENOUS at 10:18

## 2017-10-08 RX ADMIN — POTASSIUM CHLORIDE 40 MEQ: 750 CAPSULE, EXTENDED RELEASE ORAL at 16:00

## 2017-10-08 RX ADMIN — OXYCODONE HYDROCHLORIDE 10 MG: 5 TABLET ORAL at 04:45

## 2017-10-08 RX ADMIN — CETIRIZINE HYDROCHLORIDE 10 MG: 10 TABLET, FILM COATED ORAL at 20:44

## 2017-10-08 RX ADMIN — TAZOBACTAM SODIUM AND PIPERACILLIN SODIUM 4.5 G: .5; 4 INJECTION, POWDER, LYOPHILIZED, FOR SOLUTION INTRAVENOUS at 01:13

## 2017-10-08 RX ADMIN — FENTANYL CITRATE 50 MCG: 50 INJECTION, SOLUTION INTRAMUSCULAR; INTRAVENOUS at 11:38

## 2017-10-08 RX ADMIN — Medication 2000 MG: at 03:11

## 2017-10-08 RX ADMIN — NIFEDIPINE 120 MG: 10 CAPSULE ORAL at 08:43

## 2017-10-08 RX ADMIN — ONDANSETRON 4 MG: 2 INJECTION INTRAMUSCULAR; INTRAVENOUS at 12:37

## 2017-10-08 RX ADMIN — ATRACURIUM BESYLATE 10 MG: 10 INJECTION, SOLUTION INTRAVENOUS at 11:38

## 2017-10-08 RX ADMIN — PANTOPRAZOLE SODIUM 40 MG: 40 TABLET, DELAYED RELEASE ORAL at 06:21

## 2017-10-08 RX ADMIN — AMITRIPTYLINE HYDROCHLORIDE 75 MG: 50 TABLET, FILM COATED ORAL at 20:44

## 2017-10-08 RX ADMIN — SODIUM CHLORIDE 100 ML/HR: 9 INJECTION, SOLUTION INTRAVENOUS at 04:45

## 2017-10-08 RX ADMIN — CITALOPRAM HYDROBROMIDE 20 MG: 20 TABLET ORAL at 08:43

## 2017-10-08 RX ADMIN — FAMOTIDINE 20 MG: 20 TABLET ORAL at 17:50

## 2017-10-08 RX ADMIN — SODIUM CHLORIDE, POTASSIUM CHLORIDE, SODIUM LACTATE AND CALCIUM CHLORIDE: 600; 310; 30; 20 INJECTION, SOLUTION INTRAVENOUS at 10:35

## 2017-10-08 RX ADMIN — DOCUSATE SODIUM 100 MG: 100 CAPSULE, LIQUID FILLED ORAL at 08:43

## 2017-10-08 RX ADMIN — SODIUM CHLORIDE 100 ML/HR: 9 INJECTION, SOLUTION INTRAVENOUS at 14:15

## 2017-10-08 RX ADMIN — ATRACURIUM BESYLATE 10 MG: 10 INJECTION, SOLUTION INTRAVENOUS at 11:06

## 2017-10-08 RX ADMIN — SUCCINYLCHOLINE CHLORIDE 120 MG: 20 INJECTION, SOLUTION INTRAMUSCULAR; INTRAVENOUS at 10:25

## 2017-10-08 NOTE — PLAN OF CARE
Problem: Patient Care Overview (Adult)  Goal: Plan of Care Review  Outcome: Ongoing (interventions implemented as appropriate)    10/08/17 0723   Coping/Psychosocial Response Interventions   Plan Of Care Reviewed With patient;daughter   Patient Care Overview   Progress no change   Outcome Evaluation   Outcome Summary/Follow up Plan VSS. pt admitted for rheumatoid arthritis flare up. L wrist and L knee swollen and painful to the touch. pt's mobility decreased from independant to bedridden in past six weeks.       Goal: Discharge Needs Assessment  Outcome: Ongoing (interventions implemented as appropriate)    10/08/17 0238 10/08/17 0723   Discharge Needs Assessment   Concerns To Be Addressed --  denies needs/concerns at this time   Readmission Within The Last 30 Days --  no previous admission in last 30 days   Discharge Disposition --  still a patient   Current Health   Anticipated Changes Related to Illness --  inability to care for self   Living Environment   Transportation Available --  car;family or friend will provide   Self-Care   Equipment Currently Used at Home wheelchair;walker, standard --          Problem: Pain, Acute (Adult)  Goal: Identify Related Risk Factors and Signs and Symptoms  Outcome: Outcome(s) achieved Date Met:  10/08/17    10/08/17 0723   Pain, Acute   Related Risk Factors (Acute Pain) disease process;infection   Signs and Symptoms (Acute Pain) fatigue/weakness;impaired thought process/concentration;guarding/abnormal posturing/positioning;pacing/restlessness;verbalization of pain descriptors;sleep pattern alteration       Goal: Acceptable Pain Control/Comfort Level  Outcome: Ongoing (interventions implemented as appropriate)    10/08/17 0723   Pain, Acute (Adult)   Acceptable Pain Control/Comfort Level making progress toward outcome

## 2017-10-08 NOTE — ED PROVIDER NOTES
Subjective   HPI Comments: Richard Guevara is a 57 y.o.male with a history of rheumatoid arthritis who presents to the ED with c/o bilateral forearm swelling, redness, and pain that has progressively worsened in the last three weeks. He also notes an area of significant redness and swelling on the left forearm after using the arm to support himself on his walker. He has tried applying ice to the area without relief. His family contacted his MD, Dr. Berg, after the patient became delirious at times with no obvious cause. They were referred to the emergency department for possible sepsis of the joints.    He has been on steroids for ten years and regularly receives injections in the left knee. He does mention that he missed a shot or two when he was sick with the flu. His arm swelling developed shortly after the flu symptoms resolved. The patient's family note that he has been immobile for the last three weeks and that he has had increasing difficulty ambulating secondary to swelling in the left knee. He has been unable to fully straighten the joints in his left leg. He has no known fever. He denies feeling short of breath. Other than an episode of emesis prior to arrival, there are no other acute complaints from either the patient or his family at this time.     Patient is a 57 y.o. male presenting with upper extremity pain.   History provided by:  Patient and relative  Upper Extremity Issue   Location:  Arm  Arm location:  R forearm and L forearm  Injury: no    Pain details:     Quality:  Aching    Radiates to:  Does not radiate    Severity:  Moderate    Onset quality:  Gradual    Duration:  3 weeks    Timing:  Constant    Progression:  Worsening  Relieved by:  Nothing  Worsened by:  Nothing  Ineffective treatments:  Ice  Associated symptoms: swelling    Associated symptoms: no back pain, no fatigue, no fever, no muscle weakness, no neck pain, no numbness, no stiffness and no tingling    Swelling:     Location:  Arm     Onset quality:  Gradual    Duration:  3 weeks    Timing:  Constant    Progression:  Worsening    Chronicity:  New      Review of Systems   Constitutional: Negative for chills, fatigue and fever.   HENT: Negative for congestion, rhinorrhea, sore throat and trouble swallowing.    Respiratory: Negative for cough and shortness of breath.    Cardiovascular: Negative for chest pain and leg swelling.   Gastrointestinal: Positive for nausea and vomiting. Negative for abdominal pain and diarrhea.   Musculoskeletal: Positive for gait problem and joint swelling (left knee and both forearms). Negative for back pain, neck pain and stiffness.        Pain in bilateral forearms   Skin: Positive for color change (erythema of bilateral forearms).   Neurological: Negative for dizziness, weakness, numbness and headaches.   Psychiatric/Behavioral: Negative for confusion.        Episodes of delirium    All other systems reviewed and are negative.      Past Medical History:   Diagnosis Date   • Hyperlipidemia    • Hypertension    • RA (rheumatoid arthritis)        No Known Allergies    Past Surgical History:   Procedure Laterality Date   • CORONARY ANGIOPLASTY WITH STENT PLACEMENT      x1, around 2007 edith   • SKIN BIOPSY      hx a few suspicious spots removed, only required removal, unsure of result       Family History   Problem Relation Age of Onset   • Osteoporosis Mother    • COPD Father    • Rheum arthritis Father    • Arthritis Sister    • Osteoporosis Sister        Social History     Social History   • Marital status:      Spouse name: N/A   • Number of children: N/A   • Years of education: N/A     Social History Main Topics   • Smoking status: Current Every Day Smoker     Packs/day: 1.00   • Smokeless tobacco: Never Used   • Alcohol use No   • Drug use: No   • Sexual activity: Defer     Other Topics Concern   • None     Social History Narrative   • None         Objective   Physical Exam   Constitutional: He is oriented to  person, place, and time. He appears well-developed and well-nourished. He appears distressed (unable to get comfortable in bed. ).   HENT:   Head: Normocephalic and atraumatic.   Right Ear: External ear normal.   Left Ear: External ear normal.   Mouth/Throat: Oropharynx is clear and moist.   Eyes: EOM are normal. Pupils are equal, round, and reactive to light.   Neck: Normal range of motion. Neck supple.   Cardiovascular: Normal heart sounds, intact distal pulses and normal pulses.  Tachycardia present.    Pulmonary/Chest: Effort normal and breath sounds normal. No respiratory distress. He has no wheezes.   Abdominal: Soft. Bowel sounds are normal. He exhibits no distension. There is no tenderness.   Musculoskeletal:        Right wrist: He exhibits tenderness (diffuse tenderness, ) and swelling. He exhibits normal range of motion.        Left wrist: He exhibits tenderness and swelling (erythema and edema, warm to palpation). He exhibits normal range of motion.        Left knee: He exhibits decreased range of motion, swelling and erythema. Tenderness (diffuse tenderness, unable to fully extend extremity.) found.   Neurological: He is alert and oriented to person, place, and time. No cranial nerve deficit.   Skin: Skin is warm and dry.   Psychiatric: He has a normal mood and affect.   Nursing note and vitals reviewed.      Procedures         ED Course  ED Course   Value Comment By Time   C-Reactive Protein: (!) 9.22 (Reviewed) Mukul Hwang MD 10/07 2157   Sed Rate: (!) 95 (Reviewed) Mukul Hwang MD 10/07 2157   Heart Rate: (!) 124 (Reviewed) Mukul Hwang MD 10/07 2157    I personally evaluated the patient.  The patient does have significant effusion of both wrists.  There is erythema of the left wrist the patient believes is from possible ecchymoses from attempting to use a walker.  However, it is warm with significant localized erythema and swelling.  With the patient's presenting symptoms  this is concerning for a secondary infection. Mukul Hwang MD 10/07 7707    Dr. Ortiz is here in the ER and assisted in synovial joint aspiration of the right wrist, left wrist, and left knee.  Orders have been placed for cultures, cell count, differential, and crystal evaluation. Mukul Hwang MD 10/07 7376    0030  and is repositioned in bed.  Antibiotics are ordered.  Patient will be admitted to the hospital. Adelaide Anne, ZAC 10/08 0207    0115  Dr. Rea advised of results at this time. Pt will be admitted to a [facility] bed. Adelaide Anne, ZAC 10/08 0208     Recent Results (from the past 24 hour(s))   Light Blue Top    Collection Time: 10/07/17  9:03 PM   Result Value Ref Range    Extra Tube hold for add-on    Green Top (Gel)    Collection Time: 10/07/17  9:03 PM   Result Value Ref Range    Extra Tube Hold for add-ons.    Lavender Top    Collection Time: 10/07/17  9:03 PM   Result Value Ref Range    Extra Tube hold for add-on    Gold Top - SST    Collection Time: 10/07/17  9:03 PM   Result Value Ref Range    Extra Tube Hold for add-ons.    Comprehensive Metabolic Panel    Collection Time: 10/07/17  9:03 PM   Result Value Ref Range    Glucose 137 (H) 70 - 100 mg/dL    BUN 14 9 - 23 mg/dL    Creatinine 0.70 0.60 - 1.30 mg/dL    Sodium 137 132 - 146 mmol/L    Potassium 3.4 (L) 3.5 - 5.5 mmol/L    Chloride 106 99 - 109 mmol/L    CO2 24.0 20.0 - 31.0 mmol/L    Calcium 9.6 8.7 - 10.4 mg/dL    Total Protein 7.0 5.7 - 8.2 g/dL    Albumin 3.50 3.20 - 4.80 g/dL    ALT (SGPT) 100 (H) 7 - 40 U/L    AST (SGOT) 42 (H) 0 - 33 U/L    Alkaline Phosphatase 115 (H) 25 - 100 U/L    Total Bilirubin 0.4 0.3 - 1.2 mg/dL    eGFR Non African Amer 117 >60 mL/min/1.73    Globulin 3.5 gm/dL    A/G Ratio 1.0 (L) 1.5 - 2.5 g/dL    BUN/Creatinine Ratio 20.0 7.0 - 25.0    Anion Gap 7.0 3.0 - 11.0 mmol/L   aPTT    Collection Time: 10/07/17  9:03 PM   Result Value Ref Range    PTT 26.1 24.0 - 31.0 seconds   Protime-INR     Collection Time: 10/07/17  9:03 PM   Result Value Ref Range    Protime 12.0 (H) 9.6 - 11.5 Seconds    INR 1.10    Uric Acid    Collection Time: 10/07/17  9:03 PM   Result Value Ref Range    Uric Acid 5.5 3.7 - 9.2 mg/dL   Sedimentation Rate    Collection Time: 10/07/17  9:03 PM   Result Value Ref Range    Sed Rate 95 (H) 0 - 20 mm/hr   CBC Auto Differential    Collection Time: 10/07/17  9:03 PM   Result Value Ref Range    WBC 4.94 3.50 - 10.80 10*3/mm3    RBC 4.31 4.20 - 5.76 10*6/mm3    Hemoglobin 14.9 13.1 - 17.5 g/dL    Hematocrit 43.3 38.9 - 50.9 %    .5 (H) 80.0 - 99.0 fL    MCH 34.6 (H) 27.0 - 31.0 pg    MCHC 34.4 32.0 - 36.0 g/dL    RDW 13.9 11.3 - 14.5 %    RDW-SD 50.6 37.0 - 54.0 fl    MPV 9.4 6.0 - 12.0 fL    Platelets 347 150 - 450 10*3/mm3    Neutrophil % 51.8 41.0 - 71.0 %    Lymphocyte % 30.8 24.0 - 44.0 %    Monocyte % 14.4 (H) 0.0 - 12.0 %    Eosinophil % 1.8 0.0 - 3.0 %    Basophil % 0.6 0.0 - 1.0 %    Immature Grans % 0.6 0.0 - 0.6 %    Neutrophils, Absolute 2.56 1.50 - 8.30 10*3/mm3    Lymphocytes, Absolute 1.52 0.60 - 4.80 10*3/mm3    Monocytes, Absolute 0.71 0.00 - 1.00 10*3/mm3    Eosinophils, Absolute 0.09 0.00 - 0.30 10*3/mm3    Basophils, Absolute 0.03 0.00 - 0.20 10*3/mm3    Immature Grans, Absolute 0.03 0.00 - 0.03 10*3/mm3    nRBC 0.0 0.0 - 0.0 /100 WBC   POC Troponin, Rapid    Collection Time: 10/07/17  9:15 PM   Result Value Ref Range    Troponin I 0.03 0.00 - 0.07 ng/mL   Lactic Acid, Plasma    Collection Time: 10/07/17  9:33 PM   Result Value Ref Range    Lactate 1.5 0.5 - 2.0 mmol/L   Procalcitonin    Collection Time: 10/07/17  9:33 PM   Result Value Ref Range    Procalcitonin 0.16 ng/mL   C-reactive Protein    Collection Time: 10/07/17  9:33 PM   Result Value Ref Range    C-Reactive Protein 9.22 (H) 0.00 - 1.00 mg/dL   Crystal Exam, Fluid - Synovial Fluid, Knee, Left    Collection Time: 10/07/17 11:47 PM   Result Value Ref Range    Crystals, Fluid No crystals seen    Body  "fluid cell count - Body Fluid,    Collection Time: 10/07/17 11:47 PM   Result Value Ref Range    Color, Fluid Yellow     Appearance, Fluid Turbid (A) Clear    WBC, Fluid 201 /mm3    RBC, Fluid 25718 /mm3     Note: In addition to lab results from this visit, the labs listed above may include labs taken at another facility or during a different encounter within the last 24 hours. Please correlate lab times with ED admission and discharge times for further clarification of the services performed during this visit.    XR Wrist 3+ View Right   Final Result   Abnormal   Addendum 1 of 1   IMPRESSION:        1.  No acute fracture.   2.  Degenerative changes of the right wrist consistent with rheumatoid    arthritis.      THIS DOCUMENT HAS BEEN ELECTRONICALLY SIGNED BY ARMAND AMEZQUITA MD      Final   1.  No acute fracture.   2.  Degenerative changes of the right wrist consistent with rheumatoid    arthritis.      THIS DOCUMENT HAS BEEN ELECTRONICALLY SIGNED BY ARMAND AMEZQUITA MD      XR Wrist 3+ View Left   Final Result   Abnormal     Severe degenerative changes consistent with rheumatoid arthritis (worse on    the left wrist) with mild palmar subluxation of the carpal bones in relation to    the radius and ulna.      THIS DOCUMENT HAS BEEN ELECTRONICALLY SIGNED BY ARMAND AMEZQUITA MD      XR Knee 1 or 2 View Left   Final Result   Abnormal   1.  Severe tricompartmental joint space narrowing, worst laterally.   2.  Soft tissue swelling at the knee.  Small suprapatellar effusion.      THIS DOCUMENT HAS BEEN ELECTRONICALLY SIGNED BY ARMAND AMEZQUITA MD      XR Chest 1 View   Final Result   Abnormal     No acute abnormality.      THIS DOCUMENT HAS BEEN ELECTRONICALLY SIGNED BY ARMAND AMEZQUITA MD        Vitals:    10/07/17 2032 10/07/17 2214 10/08/17 0000 10/08/17 0200   BP: 150/78  148/80 139/96   Pulse: (!) 124 111 113    Resp: 16      Temp: 98.6 °F (37 °C)      SpO2: 96% 96% 94%    Weight: 210 lb (95.3 kg)      Height: 71\" (180.3 cm)    "     Medications   sodium chloride 0.9 % flush 10 mL (not administered)   vancomycin IVPB 2000 mg in 0.9% Sodium Chloride (premix) 500 mL (not administered)   sodium chloride 0.9 % bolus 1,000 mL (1,000 mL Intravenous New Bag 10/7/17 8300)   piperacillin-tazobactam (ZOSYN) 4.5 g/100 mL 0.9% NS IVPB (mbp) (4.5 g Intravenous New Bag 10/8/17 0113)     ECG/EMG Results (last 24 hours)     ** No results found for the last 24 hours. **                   Recent Results (from the past 24 hour(s))   Light Blue Top    Collection Time: 10/07/17  9:03 PM   Result Value Ref Range    Extra Tube hold for add-on    Green Top (Gel)    Collection Time: 10/07/17  9:03 PM   Result Value Ref Range    Extra Tube Hold for add-ons.    Lavender Top    Collection Time: 10/07/17  9:03 PM   Result Value Ref Range    Extra Tube hold for add-on    Gold Top - SST    Collection Time: 10/07/17  9:03 PM   Result Value Ref Range    Extra Tube Hold for add-ons.    Comprehensive Metabolic Panel    Collection Time: 10/07/17  9:03 PM   Result Value Ref Range    Glucose 137 (H) 70 - 100 mg/dL    BUN 14 9 - 23 mg/dL    Creatinine 0.70 0.60 - 1.30 mg/dL    Sodium 137 132 - 146 mmol/L    Potassium 3.4 (L) 3.5 - 5.5 mmol/L    Chloride 106 99 - 109 mmol/L    CO2 24.0 20.0 - 31.0 mmol/L    Calcium 9.6 8.7 - 10.4 mg/dL    Total Protein 7.0 5.7 - 8.2 g/dL    Albumin 3.50 3.20 - 4.80 g/dL    ALT (SGPT) 100 (H) 7 - 40 U/L    AST (SGOT) 42 (H) 0 - 33 U/L    Alkaline Phosphatase 115 (H) 25 - 100 U/L    Total Bilirubin 0.4 0.3 - 1.2 mg/dL    eGFR Non African Amer 117 >60 mL/min/1.73    Globulin 3.5 gm/dL    A/G Ratio 1.0 (L) 1.5 - 2.5 g/dL    BUN/Creatinine Ratio 20.0 7.0 - 25.0    Anion Gap 7.0 3.0 - 11.0 mmol/L   aPTT    Collection Time: 10/07/17  9:03 PM   Result Value Ref Range    PTT 26.1 24.0 - 31.0 seconds   Protime-INR    Collection Time: 10/07/17  9:03 PM   Result Value Ref Range    Protime 12.0 (H) 9.6 - 11.5 Seconds    INR 1.10    Uric Acid    Collection  Time: 10/07/17  9:03 PM   Result Value Ref Range    Uric Acid 5.5 3.7 - 9.2 mg/dL   Sedimentation Rate    Collection Time: 10/07/17  9:03 PM   Result Value Ref Range    Sed Rate 95 (H) 0 - 20 mm/hr   CBC Auto Differential    Collection Time: 10/07/17  9:03 PM   Result Value Ref Range    WBC 4.94 3.50 - 10.80 10*3/mm3    RBC 4.31 4.20 - 5.76 10*6/mm3    Hemoglobin 14.9 13.1 - 17.5 g/dL    Hematocrit 43.3 38.9 - 50.9 %    .5 (H) 80.0 - 99.0 fL    MCH 34.6 (H) 27.0 - 31.0 pg    MCHC 34.4 32.0 - 36.0 g/dL    RDW 13.9 11.3 - 14.5 %    RDW-SD 50.6 37.0 - 54.0 fl    MPV 9.4 6.0 - 12.0 fL    Platelets 347 150 - 450 10*3/mm3    Neutrophil % 51.8 41.0 - 71.0 %    Lymphocyte % 30.8 24.0 - 44.0 %    Monocyte % 14.4 (H) 0.0 - 12.0 %    Eosinophil % 1.8 0.0 - 3.0 %    Basophil % 0.6 0.0 - 1.0 %    Immature Grans % 0.6 0.0 - 0.6 %    Neutrophils, Absolute 2.56 1.50 - 8.30 10*3/mm3    Lymphocytes, Absolute 1.52 0.60 - 4.80 10*3/mm3    Monocytes, Absolute 0.71 0.00 - 1.00 10*3/mm3    Eosinophils, Absolute 0.09 0.00 - 0.30 10*3/mm3    Basophils, Absolute 0.03 0.00 - 0.20 10*3/mm3    Immature Grans, Absolute 0.03 0.00 - 0.03 10*3/mm3    nRBC 0.0 0.0 - 0.0 /100 WBC   POC Troponin, Rapid    Collection Time: 10/07/17  9:15 PM   Result Value Ref Range    Troponin I 0.03 0.00 - 0.07 ng/mL   Lactic Acid, Plasma    Collection Time: 10/07/17  9:33 PM   Result Value Ref Range    Lactate 1.5 0.5 - 2.0 mmol/L   Procalcitonin    Collection Time: 10/07/17  9:33 PM   Result Value Ref Range    Procalcitonin 0.16 ng/mL   C-reactive Protein    Collection Time: 10/07/17  9:33 PM   Result Value Ref Range    C-Reactive Protein 9.22 (H) 0.00 - 1.00 mg/dL   Crystal Exam, Fluid - Synovial Fluid, Knee, Left    Collection Time: 10/07/17 11:47 PM   Result Value Ref Range    Crystals, Fluid No crystals seen    Body fluid cell count - Body Fluid,    Collection Time: 10/07/17 11:47 PM   Result Value Ref Range    Color, Fluid Yellow     Appearance, Fluid  "Turbid (A) Clear    WBC, Fluid 201 /mm3    RBC, Fluid 00606 /mm3     Note: In addition to lab results from this visit, the labs listed above may include labs taken at another facility or during a different encounter within the last 24 hours. Please correlate lab times with ED admission and discharge times for further clarification of the services performed during this visit.    XR Wrist 3+ View Right   Final Result   Abnormal   Addendum 1 of 1   IMPRESSION:        1.  No acute fracture.   2.  Degenerative changes of the right wrist consistent with rheumatoid    arthritis.      THIS DOCUMENT HAS BEEN ELECTRONICALLY SIGNED BY ARMAND AMEZQUITA MD      Final   1.  No acute fracture.   2.  Degenerative changes of the right wrist consistent with rheumatoid    arthritis.      THIS DOCUMENT HAS BEEN ELECTRONICALLY SIGNED BY ARMAND AMEZQUITA MD      XR Wrist 3+ View Left   Final Result   Abnormal     Severe degenerative changes consistent with rheumatoid arthritis (worse on    the left wrist) with mild palmar subluxation of the carpal bones in relation to    the radius and ulna.      THIS DOCUMENT HAS BEEN ELECTRONICALLY SIGNED BY ARMAND AMEZQUITA MD      XR Knee 1 or 2 View Left   Final Result   Abnormal   1.  Severe tricompartmental joint space narrowing, worst laterally.   2.  Soft tissue swelling at the knee.  Small suprapatellar effusion.      THIS DOCUMENT HAS BEEN ELECTRONICALLY SIGNED BY ARMAND AMEZQUITA MD      XR Chest 1 View   Final Result   Abnormal     No acute abnormality.      THIS DOCUMENT HAS BEEN ELECTRONICALLY SIGNED BY ARMAND AMEZQUITA MD        Vitals:    10/07/17 2032 10/07/17 2214 10/08/17 0000 10/08/17 0200   BP: 150/78  148/80 139/96   Pulse: (!) 124 111 113    Resp: 16      Temp: 98.6 °F (37 °C)      SpO2: 96% 96% 94%    Weight: 210 lb (95.3 kg)      Height: 71\" (180.3 cm)        Medications   sodium chloride 0.9 % flush 10 mL (not administered)   vancomycin IVPB 2000 mg in 0.9% Sodium Chloride (premix) 500 mL (not " administered)   sodium chloride 0.9 % bolus 1,000 mL (1,000 mL Intravenous New Bag 10/7/17 2207)   piperacillin-tazobactam (ZOSYN) 4.5 g/100 mL 0.9% NS IVPB (mbp) (4.5 g Intravenous New Bag 10/8/17 0113)     ECG/EMG Results (last 24 hours)     Procedure Component Value Units Date/Time    ECG 12 Lead [[id number]] Collected:  10/07/17 2121     Updated:  10/07/17 2122            MDM    Final diagnoses:   Rheumatoid arthritis flare   Tachycardia   Pyogenic arthritis of multiple sites, due to unspecified organism       Documentation assistance provided by rachid Solo.  Information recorded by the rachid was done at my direction and has been verified and validated by me.     Coleen Solo  10/07/17 2128       ZAC Beyer  10/08/17 0208

## 2017-10-08 NOTE — CONSULTS
"Adult Nutrition  Assessment/PES    Patient Name:  Richard Guevara  YOB: 1960  MRN: [id number]  Admit Date:  10/7/2017    Assessment Date:  10/8/2017          Reason for Assessment       10/08/17 1522    Reason for Assessment    Reason For Assessment/Visit identified at risk by screening criteria    Identified At Risk By Screening Criteria MST SCORE 2+    Time Spent (min) 30    Cardiac CAD;HTN   HLP    Immune Rheumatoid arthritis   Acute exac. of joint pain/joint edema/joint erythema/, chronic RA    Neurological --   Altered mental status, probably metabolic encephalopathy.    Other diagnosis s/p wrist arthrotomy              Nutrition/Diet History       10/08/17 1527    Nutrition/Diet History    Typical Food/Fluid Intake Pt.'s dtr., stated not eating much mostly bites, due to nausea.            Anthropometrics       10/08/17 1528    Anthropometrics    Height Method Stated    Height 182.9 cm (72\")    Weight Method Lift/sling scale    Weight 101 kg (223 lb 8 oz)    Ideal Body Weight (IBW)    Ideal Body Weight (IBW), Male (kg) 82.07    % Ideal Body Weight 123.79    Usual Body Weight (UBW)    Usual Body Weight 110 kg (243 lb)    % Usual Body Weight 91.98    % Weight Loss  8 %    Weight Loss Time Frame 4 weeks    Body Mass Index (BMI)    BMI (kg/m2) 30.38            Labs/Tests/Procedures/Meds       10/08/17 1529    Labs/Tests/Procedures/Meds    Labs/Tests Review Reviewed                Nutrition Prescription Ordered       10/08/17 1529    Nutrition Prescription PO    Current PO Diet Clear Liquid                Malnutrition Severity Assessment       10/08/17 1531    Malnutrition Severity Assessment    Malnutrition Type Acute Illness/Injury Malnutrition    Weight Status (Acute)    Weight Loss Severe (>5% / 1 mo)    Energy Intake Status (Acute)    Energy Intake Severe (< or equal to 50% / > or equal to 5d)    Criteria Met (Must meet criteria for severity in at least 2 of these categories: M Wasting, Fat Loss, " Fluid, Secondary Signs, Wt. Status, Intake)    Patient meets criteria for  Severe malnutrition        Problem/Interventions:        Problem 1       10/08/17 1529    Nutrition Diagnoses Problem 1    Problem 1 Inadequate Intake/Infusion    Inadequate Intake Type Oral    Etiology (related to) --   clinical condition    Signs/Symptoms (evidenced by) Unintended Weight Change                    Intervention Goal       10/08/17 1530    Intervention Goal    General Nutrition support treatment    PO Establish PO            Nutrition Intervention       10/08/17 1530    Nutrition Intervention    RD/Tech Action Advise alternate selection;Interview for preference;Menu provided;Menu adjusted;Recommend/ordered;Follow Tx progress;Care plan reviewd    Recommended/Ordered Supplement            Nutrition Prescription       10/08/17 1530    Nutrition Prescription PO    PO Prescription Begin/change supplement    Supplement Boost Breeze   vary flavors.    Supplement Frequency 3 times a day            Education/Evaluation       10/08/17 1530    Education    Education --   MNT discussed with pt. and family (daughter and sister)    Monitor/Evaluation    Monitor Per protocol        Electronically signed by:  Karlene Garcia RD  10/08/17 3:32 PM

## 2017-10-08 NOTE — ANESTHESIA PREPROCEDURE EVALUATION
Anesthesia Evaluation     Patient summary reviewed and Nursing notes reviewed   no history of anesthetic complications:  NPO Solid Status: > 8 hours  NPO Liquid Status: > 8 hours     Airway   Mallampati: II  TM distance: <3 FB  Neck ROM: full  possible difficult intubation  Dental    (+) upper dentures    Pulmonary - normal exam   (+) a smoker Current,   Cardiovascular   Exercise tolerance: poor (<4 METS)    ECG reviewed  PT is on anticoagulation therapy  Rhythm: regular  Rate: normal    (+) hypertension, CAD, cardiac stents more than 12 months ago dysrhythmias Tachycardia, hyperlipidemia      Neuro/Psych  (+) weakness,    (-) seizures, CVA    ROS Comment: Mental status changes secondary to infection  GI/Hepatic/Renal/Endo    (+)  GERD,   (-) hepatitis, liver disease, no renal disease, diabetes    Musculoskeletal     Abdominal   (+) obese,    Substance History      OB/GYN          Other   (+) arthritis (rheumatoid arthritis; pyogenic arthritis)                             Anesthesia Plan    ASA 4 - emergent     general   (Labs/studies reviewed  Central line placement)  intravenous induction   Anesthetic plan and risks discussed with patient.  Use of blood products discussed with consented to blood products.

## 2017-10-08 NOTE — BRIEF OP NOTE
WRIST ARTHROTOMY, KNEE ARTHROTOMY  Progress Note    Richard Guevara  10/7/2017 - 10/8/2017    Pre-op Diagnosis:   Pyogenic arthritis of multiple sites, due to unspecified organism [M00.9]       Post-Op Diagnosis Codes:     * Pyogenic arthritis of multiple sites, due to unspecified organism [M00.9]     * Septic arthritis of knee, left [M00.9]     * Septic arthritis of wrist, left [M00.9]     * Septic arthritis of wrist, right [M00.9]    Procedure/CPT® Codes:  ID EXPLOR/DRAIN KNEE,INFECTN [79745]  ID ARTHROTOMY RADIOCARPAL/ MIDCARPAL W EXPLORE, DRAIN, REMOVE FB [62838]  ID ARTHROTOMY RADIOCARPAL/ MIDCARPAL W EXPLORE, DRAIN, REMOVE FB [[id number]]    Procedure(s):  WRIST ARTHROTOMY  KNEE ARTHROTOMY    Surgeon(s):  Addy Ortiz MD    Anesthesia: Choice    Staff:   Circulator: Reena Bee RN; Zana Yang RN  Scrub Person: Lo Anne; Josey Kemp    Estimated Blood Loss: 75 mL    Urine Voided: * No values recorded between 10/8/2017 10:19 AM and 10/8/2017  1:10 PM *    Specimens:                None      Drains:           Findings: Purulent joint fluid present in the bilateral radiocarpal joints and left knee joint    Complications: None apparent      Addy Ortiz MD     Date: 10/8/2017  Time: 1:16 PM

## 2017-10-08 NOTE — ANESTHESIA POSTPROCEDURE EVALUATION
Patient: Richard Guevara    Procedure Summary     Date Anesthesia Start Anesthesia Stop Room / Location    10/08/17 1018 1313 [facility] / [facility]       Procedure Diagnosis Surgeon Provider    WRIST ARTHROTOMY (Bilateral Wrist); KNEE ARTHROTOMY (Left Knee) Pyogenic arthritis of multiple sites, due to unspecified organism; Septic arthritis of knee, left; Septic arthritis of wrist, left; Septic arthritis of wrist, right  (Pyogenic arthritis of multiple sites, due to unspecified organism [M00.9]) MD Angle Meyer MD          Anesthesia Type: general  Last vitals  /63       Temp   97 °F (36.1 °C) (10/08/17 1312)    Pulse   89 (10/08/17 1312)   Resp     20   SpO2     97%     Post Anesthesia Care and Evaluation    Patient location during evaluation: PACU  Patient participation: complete - patient participated  Level of consciousness: awake  Pain score: 0  Pain management: adequate  Airway patency: patent  Anesthetic complications: No anesthetic complications  PONV Status: none  Cardiovascular status: acceptable and hemodynamically stable  Respiratory status: acceptable, spontaneous ventilation, nasal cannula and nonlabored ventilation    Comments: Labs/studies reviewed

## 2017-10-08 NOTE — H&P
"    [facility] Medicine Services  HISTORY AND PHYSICAL    Primary Care Physician: Tanner Santiago MD   Rheumatology: Dr. Suraj Berg  Cardiology: Dr. Josey Nicole    Subjective     Chief Complaint: painful red swollen joints    History of Present Illness:     Mr. Guevara presented to [facility] 10/7 evening for painful red swollen joints. Bilateral wrists and left knee. Onset \"a few weeks ago.\" Gradual. Started more mild and now progressed to moderate. Constant. He reports his baseline chronic pain level is 7/10, and today is 8/10 - he felt it got worse being off his autoimmune medications for a few weeks, but is improving some initially since recently restarted them. He feels left wrist is worse r/t acute worse mobility and leaning on his walker on that location when getting up. No improvement with icing the areas. He has a history of advanced rheumatoid arthritis and has been disabled since age 30s r/t it. He is on methotrexate, actemra, prednisone (was just increased from 5 mg daily to 10 mg daily last week), and salsalate, and also takes tramadol for pain.    Richard states his decline all started 6 weeks ago \"when I got the flu.\" He did not see any provider or be tested for it, but he says he is certain he had it because of how bad he felt with URI complaints and everything hurting and feeling miserable. He states since he was sick, he stopped taking his weekly methotrexate for a few weeks. He recently started it back 2 doses/weeks ago, and restarted his monthly actemra 10/2.    Mr. Guevara children contacted his rheumatologist for him (r/t associated acute disorientation) and they were advised to go to the ED for further evaluation.    He is being admitted to [facility] for further evaluation.    Review of Systems   Constitutional: Positive for activity change, appetite change, chills, diaphoresis and fatigue. Negative for fever.        Cold sweats in last few weeks with activity. Lost " estimated 20 lbs in last 5-6 weeks.   HENT: Negative for congestion, dental problem, ear pain, rhinorrhea, sinus pain, sinus pressure, sore throat and trouble swallowing.         Reports 6 weeks ago did have runny/stuffy nose, sore throat, cough, severe myalgias, feeling like he was dying.   Respiratory: Negative for apnea, cough, chest tightness and shortness of breath.         Reports wheezing with recent URI, resolved. Denies orthopnea. No DONNELLY.   Cardiovascular: Positive for palpitations. Negative for chest pain and leg swelling.        Occ palpitations with activity.   Gastrointestinal: Positive for vomiting. Negative for abdominal distention, abdominal pain, blood in stool, constipation, diarrhea and nausea.        One vomiting episode today when taking medicine.   Genitourinary: Negative for decreased urine volume, difficulty urinating, dysuria, hematuria, penile swelling and scrotal swelling.        Denies dark/odorous urine.   Musculoskeletal: Positive for arthralgias and joint swelling.        Myalgias resolved. Increased arthralgias. Hx back discomfort at baseline. Last weeks worsening gait, and increased immobility.   Skin: Positive for color change. Negative for rash and wound.   Allergic/Immunologic: Positive for immunocompromised state.   Neurological: Positive for weakness. Negative for syncope.   Hematological: Negative for adenopathy. Bruises/bleeds easily.   Psychiatric/Behavioral: Negative for agitation, behavioral problems and hallucinations.        Acute forgetting the last few weeks, the last few days forgetting short term events and maybe a few episodes os disorientation. No otherwise confusion.      Otherwise complete 10 system ROS performed and negative except as mentioned in the HPI.    Past Medical History:   Diagnosis Date   • Altered mental status 10/8/2017   • CAD (coronary artery disease) 10/8/2017   • Erythematous condition 10/8/2017   • Gout 10/8/2017   • HLD (hyperlipidemia)  10/8/2017   • Hyperlipidemia    • Hypertension    • Immobility 10/8/2017   • Joint erythema 10/8/2017   • Joint pain 10/8/2017   • RA (rheumatoid arthritis)    • Rheumatoid arthritis 10/8/2017   • Weakness 10/8/2017       Past Surgical History:   Procedure Laterality Date   • CORONARY ANGIOPLASTY WITH STENT PLACEMENT      x1, around 2007 edith   • SKIN BIOPSY      hx a few suspicious spots removed, only required removal, unsure of result       Family History   Problem Relation Age of Onset   • Osteoporosis Mother    • COPD Father    • Rheum arthritis Father    • Arthritis Sister    • Osteoporosis Sister        Social History     Social History   • Marital status:      Spouse name: N/A   • Number of children: N/A   • Years of education: N/A     Occupational History   • Not on file.     Social History Main Topics   • Smoking status: Current Every Day Smoker     Packs/day: 1.00   • Smokeless tobacco: Never Used   • Alcohol use No   • Drug use: No   • Sexual activity: Defer     Other Topics Concern   • Not on file     Social History Narrative    Mr. Guevara is a 57 year old white  male. He has 2 children. He has been disabled from a younger age r/t advanced rheumatoid arthritis. He does not have an advanced directive.       Medications:     amitriptyline (ELAVIL) 75 MG tablet Take  by mouth Every Night. Historical Provider, MD Needs Review       aspirin 81 MG tablet Take  by mouth Daily. Historical Provider, MD Needs Review       atorvastatin (LIPITOR) 40 MG tablet Take 40 mg by mouth Daily. Historical Provider, MD Needs Review       carvedilol CR (COREG CR) 40 MG 24 hr capsule Take 40 mg by mouth Daily. Historical Provider, MD Needs Review       citalopram (CeleXA) 20 MG tablet Take 20 mg by mouth Daily. Historical Provider, MD Needs Review       clopidogrel (PLAVIX) 75 MG tablet Take 75 mg by mouth Daily. Historical Provider, MD Needs Review       colchicine 0.6 MG tablet Take 0.6 mg by mouth Daily.  "Mickey Kerr MD Needs Review       diltiaZEM CD (CARTIA XT) 120 MG 24 hr capsule Take 120 mg by mouth Daily. Mickey Kerr MD Needs Review       docusate sodium (COLACE) 100 MG capsule Take 1 tablet by mouth Daily. Mickey Kerr MD Needs Review       esomeprazole (nexIUM) 40 MG capsule Take 40 mg by mouth Every Morning Before Breakfast. Mickey Kerr MD Needs Review       folic acid-vit B6-vit B12 (FOLGARD) 2.2-25-1 MG tablet tablet Take  by mouth Daily. Mickey Kerr MD Needs Review       loratadine (CLARITIN) 10 MG tablet Take 10 mg by mouth Daily. Mickey Kerr MD Needs Review       predniSONE (DELTASONE) 5 MG tablet Take 5 mg by mouth Daily. Mickey Kerr MD Needs Review       raNITIdine (ZANTAC) 150 MG tablet Take 150 mg by mouth 2 (Two) Times a Day. Mickey Kerr MD Needs Review       salsalate (DISALCID) 750 MG tablet Take 750 mg by mouth 2 (Two) Times a Day. Mickey Kerr MD Needs Review       traMADol (ULTRAM) 50 MG tablet Take 50 mg by mouth Every 6 (Six) Hours As Needed for Moderate Pain . Mickey Kerr MD Needs Review      Methotrexate 1 cc inj weekly  Acterma 1 inj monthly    Allergies:  No Known Allergies      Objective     Physical Exam:  Vital Signs: /96  Pulse 113  Temp 98.6 °F (37 °C)  Resp 16  Ht 71\" (180.3 cm)  Wt 210 lb (95.3 kg)  SpO2 94%  BMI 29.29 kg/m2  Physical Exam   Constitutional: He is oriented to person, place, and time. He appears well-developed and well-nourished. No distress.   HENT:   Head: Normocephalic and atraumatic.   Mouth/Throat: Oropharynx is clear and moist. No oropharyngeal exudate.   Dentures. MM pink/dry.   Eyes: Conjunctivae and EOM are normal. Pupils are equal, round, and reactive to light. Right eye exhibits no discharge. Left eye exhibits no discharge. No scleral icterus.   Neck: No JVD present. No tracheal deviation present.   Cardiovascular: Regular rhythm, normal heart sounds and " intact distal pulses.  Tachycardia present.    No murmur heard.  Pulses:       Radial pulses are 2+ on the right side, and 2+ on the left side.        Dorsalis pedis pulses are 2+ on the right side, and 2+ on the left side.   Radials palpable pressing through edema. Cap refill <3 seconds.   Pulmonary/Chest: Effort normal and breath sounds normal. No respiratory distress. He has no wheezes. He has no rales. He exhibits no tenderness.   Abdominal: Soft. Bowel sounds are normal. He exhibits no distension. There is no tenderness. There is no guarding.   Genitourinary:   Genitourinary Comments: Bladder non-distended, non-tender.   Musculoskeletal: He exhibits edema and tenderness.   Reduced/painful ROM to bilateral wrists (L>R) and left knee.   Neurological: He is alert and oriented to person, place, and time. No cranial nerve deficit.   Skin: Skin is warm and dry. No rash noted. He is not diaphoretic.   Left wrist greater erythema, hot, edema. Right wrist erythema, warmer, edema. Left knee edema, not hot or red. Bilateral hands moderate edema, L>R, and in fingers, above wrists no further edema, no other edema on body.   Psychiatric: He has a normal mood and affect. His behavior is normal. Judgment and thought content normal.   Calm, relaxed, cooperative, engages, pleasant. A&O x4, does not recall a few of the events his family reports occurred when he was acting off the last few days.       Results Reviewed:    Lab Results (last 24 hours)     Procedure Component Value Units Date/Time    CBC & Differential [[id number]] Collected:  10/07/17 2103    Specimen:  Blood Updated:  10/07/17 2114    Narrative:       The following orders were created for panel order CBC & Differential.  Procedure                               Abnormality         Status                     ---------                               -----------         ------                     CBC Auto Differential[[id number]]        Abnormal            Final result                  Please view results for these tests on the individual orders.    Comprehensive Metabolic Panel [[id number]]  (Abnormal) Collected:  10/07/17 2103    Specimen:  Blood Updated:  10/07/17 2130     Glucose 137 (H) mg/dL      BUN 14 mg/dL      Creatinine 0.70 mg/dL      Sodium 137 mmol/L      Potassium 3.4 (L) mmol/L      Chloride 106 mmol/L      CO2 24.0 mmol/L      Calcium 9.6 mg/dL      Total Protein 7.0 g/dL      Albumin 3.50 g/dL      ALT (SGPT) 100 (H) U/L      AST (SGOT) 42 (H) U/L      Alkaline Phosphatase 115 (H) U/L      Total Bilirubin 0.4 mg/dL      eGFR Non African Amer 117 mL/min/1.73      Globulin 3.5 gm/dL      A/G Ratio 1.0 (L) g/dL      BUN/Creatinine Ratio 20.0     Anion Gap 7.0 mmol/L     Narrative:       [employer] Guidelines    Stage     Description        GFR  1         Normal or High     90+  2         Mild decrease      60-89  3         Moderate decrease  30-59  4         Severe decrease    15-29  5         Kidney failure     <15    aPTT [[id number]]  (Normal) Collected:  10/07/17 2103    Specimen:  Blood Updated:  10/07/17 2127     PTT 26.1 seconds     Narrative:       PTT = The equivalent PTT values for the therapeutic range of heparin levels at 0.3 to 0.5 U/ml are 45 to 60 seconds.    Protime-INR [[id number]]  (Abnormal) Collected:  10/07/17 2103    Specimen:  Blood Updated:  10/07/17 2127     Protime 12.0 (H) Seconds      INR 1.10    Narrative:       Therapeutic Ranges for INR: 2.0-3.0 (PT 20-30)                              2.5-3.5 (PT 25-34)    Uric Acid [[id number]]  (Normal) Collected:  10/07/17 2103    Specimen:  Blood Updated:  10/07/17 2130     Uric Acid 5.5 mg/dL       Falsely depressed results may occur on samples drawn from patients receiving N-Acetylcysteine (NAC) or Metamizole.       Sedimentation Rate [[id number]]  (Abnormal) Collected:  10/07/17 2103    Specimen:  Blood Updated:  10/07/17 2118     Sed Rate 95 (H) mm/hr     CBC Auto Differential  [[id number]]  (Abnormal) Collected:  10/07/17 2103    Specimen:  Blood Updated:  10/07/17 2114     WBC 4.94 10*3/mm3      RBC 4.31 10*6/mm3      Hemoglobin 14.9 g/dL      Hematocrit 43.3 %      .5 (H) fL      MCH 34.6 (H) pg      MCHC 34.4 g/dL      RDW 13.9 %      RDW-SD 50.6 fl      MPV 9.4 fL      Platelets 347 10*3/mm3      Neutrophil % 51.8 %      Lymphocyte % 30.8 %      Monocyte % 14.4 (H) %      Eosinophil % 1.8 %      Basophil % 0.6 %      Immature Grans % 0.6 %      Neutrophils, Absolute 2.56 10*3/mm3      Lymphocytes, Absolute 1.52 10*3/mm3      Monocytes, Absolute 0.71 10*3/mm3      Eosinophils, Absolute 0.09 10*3/mm3      Basophils, Absolute 0.03 10*3/mm3      Immature Grans, Absolute 0.03 10*3/mm3      nRBC 0.0 /100 WBC     POC Troponin, Rapid [[id number]]  (Normal) Collected:  10/07/17 2115    Specimen:  Blood Updated:  10/07/17 2130     Troponin I 0.03 ng/mL       Serial Number: [id number]:  [id number]       Lactic Acid, Plasma [[id number]]  (Normal) Collected:  10/07/17 2133    Specimen:  Blood Updated:  10/07/17 2147     Lactate 1.5 mmol/L       Falsely depressed results may occur on samples drawn from patients receiving N-Acetylcysteine (NAC) or Metamizole.       Procalcitonin [[id number]] Collected:  10/07/17 2133    Specimen:  Blood Updated:  10/07/17 2211     Procalcitonin 0.16 ng/mL     Narrative:       As a Marker for Sepsis (Non-Neonates):   1. <0.5 ng/mL represents a low risk of severe sepsis and/or septic shock.  2. >2 ng/mL represents a high risk of severe sepsis and/or septic shock.    As a Marker for Lower Respiratory Tract Infections that require antibiotic therapy:    PCT on Admission     Antibiotic Therapy       6-12 Hrs later  > 0.5                Strongly Recommended             >0.25 - <0.5         Recommended  0.1 - 0.25           Discouraged              Remeasure/reassess PCT  <0.1                 Strongly Discouraged     Remeasure/reassess PCT                     PCT  values of < 0.5 ng/mL do not exclude an infection, because localized infections (without systemic signs) may be associated with such low concentrations, or a systemic infection in its initial stages (< 6 hours). Furthermore, increased PCT can occur without infection. PCT concentrations between 0.5 and 2.0 ng/mL should be interpreted taking into account the patient's history. It is recommended to retest PCT within 6-24 hours if any concentrations < 2 ng/mL are obtained.    C-reactive Protein [[id number]]  (Abnormal) Collected:  10/07/17 2133    Specimen:  Blood Updated:  10/07/17 2148     C-Reactive Protein 9.22 (H) mg/dL     Blood Culture - Blood, [[id number]] Collected:  10/07/17 2141    Specimen:  Blood from Arm, Right Updated:  10/07/17 2153    Blood Culture - Blood, [[id number]] Collected:  10/07/17 2141    Specimen:  Blood from Arm, Right Updated:  10/07/17 2153    Crystal Exam, Fluid - Synovial Fluid, Wrist, Left [[id number]] Collected:  10/07/17 2347    Specimen:  Synovial Fluid from Wrist, Left Updated:  10/08/17 0216     Crystals, Fluid No crystals seen    Body Fluid Cell Count With Differential - Synovial Fluid, Wrist, Left [[id number]] Collected:  10/07/17 2347    Specimen:  Synovial Fluid from Wrist, Left Updated:  10/08/17 0331    Narrative:       The following orders were created for panel order Body Fluid Cell Count With Differential - Synovial Fluid, Wrist, Left.  Procedure                               Abnormality         Status                     ---------                               -----------         ------                     Body fluid cell count - ...[[id number]]  Abnormal            Edited Result - FINAL      Body fluid differential ...[[id number]]                      Edited Result - FINAL        Please view results for these tests on the individual orders.    Body fluid cell count - Body Fluid, [[id number]]  (Abnormal) Collected:  10/07/17 2347    Specimen:  Synovial Fluid from Wrist, Left  Updated:  10/08/17 0243     Color, Fluid Brown     Appearance, Fluid Turbid (A)     WBC, Fluid 174.200 /mm3       Corrected result. Previous result was 174 /mm3 on 10/8/2017 at 0212 EDT        RBC, Fluid 72382 /mm3     Body Fluid Cell Count With Differential - Synovial Fluid, Knee, Left [[id number]] Collected:  10/07/17 2347    Specimen:  Synovial Fluid from Knee, Left Updated:  10/08/17 0332    Narrative:       The following orders were created for panel order Body Fluid Cell Count With Differential - Synovial Fluid, Knee, Left.  Procedure                               Abnormality         Status                     ---------                               -----------         ------                     Body fluid cell count - ...[[id number]]  Abnormal            Edited Result - FINAL      Body fluid differential ...[[id number]]                      Edited Result - FINAL        Please view results for these tests on the individual orders.    Crystal Exam, Fluid - Synovial Fluid, Knee, Left [[id number]] Collected:  10/07/17 2347    Specimen:  Synovial Fluid from Knee, Left Updated:  10/08/17 0059     Crystals, Fluid No crystals seen    Body fluid cell count - Body Fluid, [[id number]]  (Abnormal) Collected:  10/07/17 2347    Specimen:  Synovial Fluid from Knee, Left Updated:  10/08/17 0244     Color, Fluid Yellow     Appearance, Fluid Turbid (A)     WBC, Fluid 201.000 /mm3      RBC, Fluid 39224 /mm3     Body fluid differential - Body Fluid, [[id number]] Collected:  10/07/17 2347    Specimen:  Synovial Fluid from Knee, Left Updated:  10/08/17 0332     Neutrophils, Fluid 94 %       Appended report.  These results have been appended to a previously final verified report.        Lymphocytes, Fluid 1 %       Appended report.  These results have been appended to a previously final verified report.        Monocytes, Fluid 5 %       Appended report.  These results have been appended to a previously final verified report.       Body  fluid differential - Body Fluid, [[id number]] Collected:  10/07/17 2347    Specimen:  Synovial Fluid from Wrist, Left Updated:  10/08/17 0331     Neutrophils, Fluid 95 %       Appended report.  These results have been appended to a previously final verified report.        Lymphocytes, Fluid 1 %       Appended report.  These results have been appended to a previously final verified report.        Monocytes, Fluid 3 %       Appended report.  These results have been appended to a previously final verified report.        Mesothelial Cells, Fluid 1 %       Appended report.  These results have been appended to a previously final verified report.       Body Fluid Cell Count With Differential - Synovial Fluid, Wrist, Right [[id number]] Collected:  10/07/17 2348    Specimen:  Synovial Fluid from Wrist, Right Updated:  10/08/17 0335    Narrative:       The following orders were created for panel order Body Fluid Cell Count With Differential - Synovial Fluid, Wrist, Right.  Procedure                               Abnormality         Status                     ---------                               -----------         ------                     Body fluid cell count - ...[[id number]]  Abnormal            Final result               Body fluid differential ...[[id number]]                      Final result                 Please view results for these tests on the individual orders.    Crystal Exam, Fluid - Synovial Fluid, Wrist, Right [[id number]] Collected:  10/07/17 2348    Specimen:  Synovial Fluid from Wrist, Right Updated:  10/08/17 0242     Crystals, Fluid No crystals seen    Body fluid cell count - Body Fluid, [[id number]]  (Abnormal) Collected:  10/07/17 2348    Specimen:  Synovial Fluid from Wrist, Right Updated:  10/08/17 0242     Color, Fluid Red     Appearance, Fluid Turbid (A)     WBC, Fluid 137.000 /mm3       Specimen clotted        RBC, Fluid 009679 /mm3       Specimen clotted       Body fluid differential - Body Fluid,  [[id number]] Collected:  10/07/17 2348    Specimen:  Synovial Fluid from Wrist, Right Updated:  10/08/17 0335     Neutrophils, Fluid 95 %      Lymphocytes, Fluid 1 %      Monocytes, Fluid 3 %      Macrophage, Fluid 1 %     Body Fluid Culture - Synovial Fluid, Wrist, Left [[id number]] Collected:  10/08/17 0100    Specimen:  Synovial Fluid from Wrist, Left Updated:  10/08/17 0305     Gram Stain Result Many (4+) WBCs seen      No organisms seen    Body Fluid Culture - Synovial Fluid, Knee, Left [[id number]] Collected:  10/08/17 0130    Specimen:  Synovial Fluid from Knee, Left Updated:  10/08/17 0159    Body Fluid Culture - Synovial Fluid, Wrist, Right [[id number]] Collected:  10/08/17 0140    Specimen:  Synovial Fluid from Wrist, Right Updated:  10/08/17 0305     Gram Stain Result Many (4+) WBCs seen      No organisms seen        ECG  Vent. rate 114 BPM  KS interval 140 ms  QRS duration 74 ms  QT/QTc 310/427 ms  P-R-T axes 22 33 53  Sinus tachycardia  Otherwise normal ECG  No previous ECGs available    XR Chest, 1 View     CLINICAL HISTORY:    57 years old, male; Pain; Chest pain; Type not specified; Additional info:   Redness and swelling of left forearm, Tachycardia, rheumatoid arthritis     TECHNIQUE:    Frontal view of the chest.     COMPARISON:    [facility] 6/4/2014 12:51:11 PM     FINDINGS:    Lungs:  Unremarkable.  No consolidation.    Pleural space:  Unremarkable.  No pneumothorax.    Heart:  Unremarkable.  No cardiomegaly.    Mediastinum:  Unremarkable.    Bones/joints:  Old healed right posterior rib fractures.     IMPRESSION:    No acute abnormality.    XR Left Wrist Complete, 3 or More Views     CLINICAL HISTORY:    57 years old, male; Pain; Wrist; Left; Additional info: Ra with swelling and   pain     TECHNIQUE:    Frontal, lateral and oblique views of the left wrist.     COMPARISON:    No relevant prior studies available.     FINDINGS:    Bones/joints: No fracture. Severe degenerative  changes consistent with   rheumatoid arthritis (worse on the left wrist) with mild palmar subluxation of   the carpal bones in relation to the radius and ulna.  Carpal joint space   narrowing.  Severe radiocarpal joint space narrowing.  Second and third   metacarpophalangeal periarticular lucencies.  No acute fracture.    Soft tissues: Soft tissue swelling of the wrist.  No radiopaque foreign body.     IMPRESSION:    Severe degenerative changes consistent with rheumatoid arthritis (worse on   the left wrist) with mild palmar subluxation of the carpal bones in relation to   the radius and ulna.    XR Right Wrist Complete, 3 or More Views     CLINICAL HISTORY:    57 years old, male; Pain; Wrist; Right; Additional info: Ra with swelling and   pain     TECHNIQUE:    Frontal, lateral and oblique views of the right wrist.     COMPARISON:    Left wrist radiograph same date     FINDINGS:    Bones/joints:  No acute fracture.  Radiocarpal joint space narrowing.    Ulnocarpal joint space narrowing.  Diffuse carpal joint space narrowing.    Carpal periarticular lucencies consistent with rheumatoid arthritis.  Fifth   carpometacarpal periarticular lucencies.  First and fifth metacarpophalangeal   periarticular lucencies.  No dislocation.    Soft tissues:  Unremarkable.  No radiopaque foreign body.     IMPRESSION:  1.  No acute fracture.  2.  Degenerative changes of the right wrist consistent with rheumatoid   Arthritis.    Addendum      Nasima Claros MD   Sun Oct 8, 2017 12:49:05 AM EDT         IMPRESSION:       1.  No acute fracture.  2.  Degenerative changes of the right wrist consistent with rheumatoid   arthritis.      XR Left Knee, 1 or 2 views     CLINICAL HISTORY:    57 years old, male; Pain; Knee; Left; Additional info: Ra with swelling and   pain     TECHNIQUE:    Frontal and/or lateral views of the left knee.     COMPARISON:    No relevant prior studies available.     FINDINGS:    Bones/joints:  Severe tricompartmental  joint space narrowing, worst   laterally.  No acute fracture.  No dislocation.    Soft tissues:  Soft tissue swelling at the knee.  Small suprapatellar   effusion.     IMPRESSION:  1.  Severe tricompartmental joint space narrowing, worst laterally.  2.  Soft tissue swelling at the knee.  Small suprapatellar effusion.    I have personally reviewed and interpreted available lab data, radiology studies and ECG obtained at time of admission.     Assessment / Plan     Problem List:   Hospital Problem List     * (Principal)Acute joint pain    Rheumatoid arthritis (Chronic)    Gout (Chronic)    Hypertension (Chronic)    CAD (coronary artery disease) (Chronic)    HLD (hyperlipidemia) (Chronic)    Joint erythema    Erythematous condition    Altered mental status    Immobility    Weakness          Assessment / Plan:    1. Acute exacerbation of joint pain / joint edema / joint erythema / chronic rheumatoid arthritis:  - RA flare vs septic arthritis?  - Await joint aspiration diagnostics done by ortho overnight to bilateral wrists and left knee.  - Continue zosyn and vanc for now while pending; consider cellulitis. Defer duplexes as localized over joints.  - Blood cultures pending. Tachycardia 110-120s but otherwise VSS. Lactic WNL.  - Uric acid 5.5, continue colchicine.  - Ortho recs are concern for infection will likely OR for affected joints, but if not concerning for infection to provide symptomatic treatment with anti-inflammatories and autoimmune medications.  - On aspirin and plavix for #3. Will add lower dosed NSAID, IV toradol r/t GI, x5 days, and use topical PRN as well. Continue home tramadol PRN. Hx hydrocodone in the past ineffective - will use oxy IR PRN for uncontrolled pain and increase as needed. Renal WNL.  - Will defer prednisone until studies back. NSAID as above. Not due for methotrexate as just taken a few days ago, just had monthly actemra last week - can await final cultures before ordering further  doses.  - Await preliminary studies before ordering a diet.    2. Altered mental status, Probable metabolic encephalopathy /pain related :  - Unclear etiology of the prior disorientation spells.  - A&O at this time.  - Defer head imaging at this time.    3. Coronary artery disease / hypertension / hyperlipidemia:  - Continue aspirin and plavix.  - Monitor additional anti-inflammatory/NSAID while on them and consider to d/c. Toradol x5 days for now (will avoid PO since hx reflux/irriation and on PPI plus H2 blockers). Topical NSAID as well to use lower PO dose.  - Continue BB and CCB. Pt thinks hx of an unknown arrhythmia in the past. Follows outpatient with cardiology. ST today.  - Statin - on colchicine for gout. Mildly elevated liver enzymes. Low dose tylenol PRN. Monitor.    4. Elevated liver enzymes:  - As above.  - Consider further workup.    5. Exacerbation of weakness and immobility:  - PT/OT.  - Replace electrolytes as needed.  - IV hydration for clinical dehydration.    6. Tachycardia:  - IV fluids. Abx. As above.      DVT prophylaxis: Teds/scuds. Will defer other pharmacological for now as on aspirin and plavix, and adding additional NSAID for treatment above. Consider heparin SQ.    Code Status: Full code / full support. No advanced directive. Desires no restrictions. Adult children as next of kin, are present per pt request for visit.    Admission Status: Patient will be admitted to INPATIENT status due to the need for care which can only be reasonably provided in an hospital setting such as aggressive/expedited ancillary services and/or consultation services, the necessity for IV medications, close physician monitoring and/or the possible need for procedures.  In such, I feel patient’s risk for adverse outcomes and need for care warrant INPATIENT evaluation and predict the patient’s care encounter to likely last beyond 2 midnights.     Josey Fritz, APRN 10/08/17 4:11 AM      Brief Attending  Admission Attestation     I have seen and examined the patient, performing an independent face-to-face diagnostic evaluation with plan of care reviewed and developed with the advanced practice clinician (APC).      Brief Summary Statement/HPI:    57-year-old Mr. Guevara with history of rheumatoid arthritis who stopped his methotrexate 3 weeks ago when he had's flulike symptoms is coming to [facility] secondary to painful joints with swelling and erythema, bilateral wrist as well as left knee, with progressively worsening over last many days to the level that he is not able to ambulate, pain very within 7-8 x 10 on the pain scale, he also takes prednisone 5 mg every day.  In the emergency room patient was afebrile, normocytosis but with concern with warmth tenderness and swollen joints patient was seen by orthopedics and underwent arthrocentesis and started on Zosyn and vancomycin in the emergency room.  Patient denies any fever chills or rigors.  Patient denies any sick contacts.  Complains of weight loss, myalgia      Attending Physical Exam:  Constitutional: No acute distress, awake, alert sitting in bed comfortable  Eyes: PERRLA, sclerae anicteric, no conjunctival injection  HENT: NCAT, mucous membranes moist  Neck: Supple, no thyromegaly, no lymphadenopathy, trachea midline  Respiratory: Clear to auscultation bilaterally, nonlabored respirations   Cardiovascular: RRR, no murmurs, rubs, or gallops, palpable pedal pulses bilaterally  Gastrointestinal: Positive bowel sounds, soft, nontender, nondistended  Musculoskeletal: Bilateral wrist with mild erythema significant swelling and decreased range of motion along with the left knee with similar finding with warmth and mild erythema and mild tenderness post arthrocentesis with bandage in place.  Positive restriction of movement on the left knee with limited extension secondary to pain  Psychiatric: Oriented x 3, appropriate affect, cooperative  Neurologic:  Strength symmetric in all extremities, Cranial Nerves grossly intact to confrontation, speech clear  Skin: No rashes      Brief Assessment/Plan :  See above for further detailed assessment and plan developed with APC which I have reviewed and  edited.  Continue Zosyn, vancomycin, I discussed with Dr. Ortiz orthopedics follow labs cultures, morning T May need to discuss with the rheumatologist Dr. Berg and possible consultation with rheumatology on Monday.  If there is no signs of or evidence of infection then consider higher steroid dose.  I believe this patient meets Patient will be admitted to INPATIENT status due to the need which can only be reasonably provided in an hospital setting such as aggressive/expedited ancillary services and/or consultation services and the necessity for IV medications, close physician monitoring and/or the possible need for procedures.  In such, I feel patient’s risk for adverse outcomes and need for care warrant INPATIENT evaluation and predict the patient’s care encounter to likely last beyond 2 midnights.    Eleno Rea MD  10/08/17  5:07 AM

## 2017-10-08 NOTE — ANESTHESIA PROCEDURE NOTES
Central Line    Patient location during procedure: OR  Start time: 10/8/2017 10:35 AM  Staff  Anesthesiologist: DAVID FLOREZ  Preanesthetic Checklist  Completed: patient identified, site marked, surgical consent, pre-op evaluation, timeout performed, IV checked, risks and benefits discussed and monitors and equipment checked  Central Line Prep  Sterile Tech:cap, gloves, gown, mask and sterile barriers  Prep: chloraprep  Patient monitoring: blood pressure monitoring, continuous pulse oximetry and EKG  Central Line Procedure  Laterality:right  Location:internal jugular  Catheter Type:triple lumen  Catheter Size:7 Fr  Guidance:ultrasound guided, landmark technique and palpation technique  PROCEDURE NOTE/ULTRASOUND INTERPRETATION.  Using ultrasound guidance the potential vascular sites for insertion of the catheter were visualized to determine the patency of the vessel to be used for vascular access.  After selecting the appropriate site for insertion, the needle was visualized under ultrasound being inserted into the internal jugular vein, followed by ultrasound confirmation of wire and catheter placement. There were no abnormalities seen on ultrasound; an image was taken; and the patient tolerated the procedure with no complications.   Assessment  Post procedure:biopatch applied, line sutured, occlusive dressing applied and secured with tape  Assessement:blood return through all ports, Pineda Test, chest x-ray ordered and free fluid flow  Complications:no  Patient Tolerance:patient tolerated the procedure well with no apparent complications

## 2017-10-08 NOTE — DISCHARGE INSTRUCTIONS
"DISCHARGE INSTRUCTIONS   Dr. Ortiz     Bilateral wrist arthrotomy for septic arthritis, left knee arthrotomy for septic arthritis     Wound Care   1) Keep wound / incision area clean and dry.   2) Dressing to remain in place until post-operative day 7. If a splint or cast is present, leave in place until next appointment. Upon dressing removal (if no splint/cast present), assess for wound drainage. If no drainage is present, keep wound / incision area open to air as much as possible. If drainage is present, place sterile dressing to cover wound and assess daily. If drainage continues to occur after post-operative day 10, call the office for an urgent appointment. (You should be seen in the clinic within 1-2 days of calling).   3) No baths or swimming until otherwise instructed. The wound must remain dry for 10 days after surgery. After 10 days, you may begin to shower only if no drainage is present. No submerging the wound under standing water until cleared by your physician (no baths, hot tubs, swimming pools, etc). Sponge baths are the best way to perform personal hygiene while at the same time protecting the wound from moisture. If splint or cast is present, do not allow it to get wet.   4) Prior to showering, the wound must remain dry for 72 consecutive hours (no drainage whatsoever) prior to showering. If the wound drains or spots, the clock \"resets\" - make sure the wound has been drainage-free for 72 consecutive hours.   5) Once you are allowed to get the wound wet, please use gentle soap to wash the wound area. DO NOT aggressively scrub the wound with a washcloth or bath sponge. Please visually inspect your wound(s) at least once daily. If the wound(s) are in a difficult to see location, please use a mirror or have someone else assist with visual inspection.   6) No scrubbing the wound. You may \"pad dry\" the wound, but do not rub, as this may open up the wound and pre-dispose to wound infection.   7) Do not " "apply lotions or creams to incision site, unless instructed otherwise.   8) Observe for redness, swelling, or drainage. Please call the clinic immediately if you have fevers, chills with warmth/redness surrounding wound site or if you notice pus drainage from the wound site     Activity   No heavy lifting objects greater than 10 pounds.   No driving while on narcotic pain medication.   No submerging wound under standing water (pool, bath tub, etc.) until otherwise instructed.   You may be weightbearing as tolerated on your operative (bilateral upper and left lower) extremities   Use crutches or a walker for ambulation as instructed.   Knee and wrist range of motion as tolerated     Blood Clot Prophylaxis   Resume home aspirin and Plavix upon discharge    Discharge Pain Medications   You will be given a prescription for pain medication. You should start taking this the same day after your surgery. Wean off as tolerated. Do not wait to take the pain medication until the pain is severe, as it will be difficult to \"catch up\" once this occurs. The pain medication usually reaches its full effect ~1 hour after ingesting. If you have been sent home on Valium, this medication works well for muscle spasms. If you have been sent home on Colace, this medication should be taken until you are off all narcotic (i.e. Norco, Percocet, Oxycodone, etc) pain medications, in order to prevent constipation. Percocet or Norco have Tylenol in their ingredient lists. You must be careful not to exceed 4,000mg (4 grams) of Tylenol, from all sources, within a single 24-hr period. This means that you may not take more than 12 Percocets or Norcos within a 24-hr period. Do NOT take Regular or Extra Strength Tylenol when taking your Percocet or Norco medications. Some common side effects of the narcotic pain medications (Percocet, Oxycodone, Norco, etc) include nausea and itching. Benadryl is a great over the counter medication that helps calm your " stomach, decreases your anxiety levels, and minimizes the itching. You can easily purchase this at your local pharmacy as an over-the-counter medication. Please abide by the instructions as printed on the bottle. If your nausea persists, make sure to take small amounts of crackers or other lighter foods.     Follow-Up   Follow-up with Dr. Ortiz's office in 2 weeks from the surgery date for a post-operative evaluation. Have the following xrays done upon arrival to the follow-up appointment: None. Please call Dr. Ortiz's office at [phone] for orthopaedic appointments or questions.

## 2017-10-08 NOTE — PROGRESS NOTES
Malnutrition Severity Assessment    Patient Name:  Richard Guevara  YOB: 1960  MRN: [id number]  Admit Date:  10/7/2017    Patient meets criteria for : Severe malnutrition    Comments: Patient meets the criteria for severe acute malnutrition. Please attest and include in diagnosis as  appropriate.    Malnutrition Type: Acute Illness/Injury Malnutrition     Malnutrition Type (last 8 hours)      Malnutrition Severity Assessment       10/08/17 1531    Malnutrition Severity Assessment    Malnutrition Type Acute Illness/Injury Malnutrition      10/08/17 1531    Weight Status (Acute)    Weight Loss Severe (>5% / 1 mo)      10/08/17 1531    Energy Intake Status (Acute)    Energy Intake Severe (< or equal to 50% / > or equal to 5d)      10/08/17 1531    Criteria Met (Must meet criteria for severity in at least 2 of these categories: M Wasting, Fat Loss, Fluid, Secondary Signs, Wt. Status, Intake)    Patient meets criteria for  Severe malnutrition          Electronically signed by:  Karlene Garcia RD  10/08/17 3:39 PM

## 2017-10-08 NOTE — OP NOTE
OPERATIVE REPORT     DATE OF PROCEDURE: 10/8/17     SURGEON: Addy Ortiz M.D.     ASSISTANT(S): Circulator: Reena Bee RN; Zana Yang RN  Scrub Person: Lo Anne; Josey Kemp    PREOPERATIVE DIAGNOSIS: Septic arthritis left knee, septic arthritis right wrist, septic arthritis left wrist     POSTOPERATIVE DIAGNOSIS: same     PROCEDURE: Right wrist arthrotomy with irrigation and debridement for septic arthritis, left wrist arthrotomy with irrigation and debridement for septic arthritis, right knee arthrotomy with irrigation and debridement for septic arthritis    SURGICAL DETAILS:     APPROACH: Bilateral wrists: Volar Trae approach to the volar wrist capsule, subcutaneous ulnar approach between ECU and FCU for dorsolateral wrist capsule  Left knee: medial parapatellar arthrotomy     ANESTHESIA: Gen.     PREOPERATIVE ANTIBIOTICS: Zosyn 4.5 g     ESTIMATED BLOOD LOSS: 75 cc     TOURNIQUET TIME: 33 min @ 250 mmHg right wrist, 22 min @ 250 mmHg left wrist, 18 min @ 250 mmHg left knee    SPECIMENS: None.  Prior aspirate performed before antibiotics started in the ER.  Aerobic and anaerobic cultures from ER aspiration are pending     IMPLANTS: None     DRAINS: None     LOCAL INJECTION: None     MODIFIER(S): None     COMPLICATIONS: None apparent     INDICATIONS FOR PROCEDURE: Mr. Guevara is a 57-year-old rheumatoid arthritis patient who presented to [facility] ER complaining of bilateral wrist and left knee pain for approximately 3-4 weeks.  He did have a brief history of flulike symptoms approximately 3-4 weeks ago which coincided with his joint swelling.  Over the past 3-4 weeks, his swelling and pain have gotten progressively worse.  This is also complicated by the fact that he had stopped taking his home methotrexate for his rheumatoid arthritis.  He finally presented to the ER due to worsening symptoms and increasing difficulty with ambulation.  ER workup revealed ESR of 95 and CRP of 9.  Due  to significant pain and swelling about the bilateral wrists and left knee, aspirations of these joints were performed and personally.  Aspirations revealed greater than 100,000 white blood cells in each joint with greater than 90% neutrophils.  Given these findings, I discussed with the patient that these labs were most consistent with septic arthritis of the bilateral wrist and left knee joints.  I recommended surgical arthrotomy of all joints with irrigation and debridement.  I discussed operative and nonoperative treatment options and explained the risks associated with nonoperative treatment including continued worsening pain, possible sepsis.  After explaining this, the patient agreed to proceed with surgical intervention.  The risks, benefits, alternatives, and potential complications of the this surgery were discussed with the patient in detail to include but not limited to infection, bleeding, anesthesia risks, nerve injury, vessel injury, pain, blood clots, possible need for blood transfusion, possible need for further procedures, myocardial infarction, stroke, and death.  Specific risks for this surgery also include recurrent infection, joint stiffness, acceleration of arthritis. Specific details of the procedure, hospitalization, recovery, rehabilitation, and long-term precautions were also provided. Pre-operative teaching was provided. Surgical device selection was outlined, and the many options available were explained; final choices will be made at the time of the procedure to match the anatomy and condition of the bone, and soft tissue structures. Understanding of all topics was conveyed to me by the patient, and consent was given to proceed with a bilateral wrist arthrotomy with irrigation and debridement, left knee arthrotomy with irrigation and debridement.     INTRAOPERATIVE FINDINGS: Purulent fluid expressed from bilateral wrists and left knee.  Instability of the ulnocarpal joint and radiocarpal  joint and bilateral wrists consistent with advanced rheumatoid arthritic changes     PROCEDURE: The patient was identified in the preoperative holding area. The operative site was confirmed and marked. A sequential compression device was placed on the nonoperative leg. The risks, benefits, and alternatives to surgery were again confirmed with the patient and the patient wished to proceed. The patient was brought to the operating room and placed on the operating room table in the supine position. A huddle was performed with the patient and all vital surgical team members to confirm the correct operative site, procedure, anesthesia type, and operative plan with the patient. After anesthesia was performed, the patient was positioned in the supine position. All bony prominences were padded.  A nonsterile tourniquet was placed on the bilateral arms and the bilateral arms were positioned on arm tables.  A nonsterile tourniquet was placed on the patient's left thigh and a bump under the patient's left hip. Intravenous antibiotic prophylaxis was given and confirmed with the anesthesia team. The operative extremities were prepped and draped in the usual sterile fashion. A surgical time out was performed immediately preceding the procedure with all personnel in the operating room to confirm patient identity, the correct operative site and extremity, correct radiographic studies, availability of appropriate surgical equipment and agreement on the planned procedures.     Attention was turned first to the right wrist which was elevated and gravity-assisted exsanguination occurred.  The tourniquet was inflated.  Due to large fluctuant mass is localized to the volar radial aspect of the joint and dorsal ulnar aspect of the joint, 2 incisions were made for each wrist.  First incision was performed using a volar Trae approach.  Incision incurred down through subcutaneous tissue down to the FCR tendon.  The sheath was incised and  the tendon was reflected ulnarly.  The deep tendon sheath was then incised and the flexor pollicis muscle belly was retracted ulnarly.  At this point, the bulging and ruptured volar wrist capsule was identified.  A small arthrotomy was made in the wrist joint capsule which resulted in expression of purulent fluid from the wrist joint.  Direct visualization of the radiocarpal joint occurred and suction removed any remaining purulent fluid.  A rongeur was utilized to debride any necrotic-appearing tissue.  Once all necrotic tissue was removed and only healthy appearing tissue remained, attention was turned to the ulnar approach.  Incision incurred on the subcutaneous border at the distal aspect of the ulna utilizing the interval between the ECU and FCU.  Once again patulous capsule was bulging and identified.  An arthrotomy was made into the patulous capsule and medial purulent fluid was expressed.  The fluid was suctioned and direct visualization of the ulnocarpal joint was identified.  All necrotic appearing tissue was debrided using a rongeur to healthy wound bed.  Please note that all purulent fluid was consistent with the prior aspirated fluid from the ER.  Therefore no further samples were obtained.    Once decompression of the wrist joint was complete, a total of 6 L of sterile saline was administered through the wrist joint using cystoscopy tubing.  3 L of irrigation occurred through the volar incision in 3 L through the ulnar-based incision.  At the conclusion of the irrigation, direct inspection of the wounds revealed no further purulent fluid and no evidence of necrotic tissue.  The tourniquet was then deflated and hemostasis obtained with electrocautery.  The radial artery was palpated and found to be intact.  Layered closure then occurred using 2-0 PDS suture for the arthrotomy, 2-0 PDS interrupted suture for the subcutaneous layer, and 3-0 nylon for skin.  Sterile dressing consisting of Xeroform, 4 x 4,  sterile soft roll and Ace wrap were applied.  Compartments remain soft and capillary refills less than 2 seconds to all digits.    Next attention was turned to the left wrist where the same approaches and debridements occurred in like fashion to the right wrist.  Laila material was purulent fluid was suctioned from the wrist joints and debridement occurred with rongeur to healthy wound beds.  Gravity exsanguination occurred prior to tourniquet inflation, and hemostasis after tourniquet deflation was performed with electrocautery.  Like the right wrist, purulent fluid was expressed from both arthrotomy points and 6 L of sterile saline were irrigated through the wrist joint, 3 L through the volar incision and 3 L through the ulnar-based incision.  Upon tourniquet deflation, the radial artery was found to be intact.  The wound was closed in similar fashion with similar sterile dressing.    Next attention was turned to the left knee.  The extremity was elevated gravity was used to assist with exsanguination before tourniquet inflation.  A standard midline incision was made through skin subcutaneous tissue down to the level of the retinaculum.  A medial parapatellar arthrotomy was then performed and purulent fluid was expressed.  The joint was suctioned and necrotic appearing synovium was debrided using a rongeur.  Once all necrotic tissue was debrided, 6 L of sterile saline were used to irrigate the left knee arthrotomy.  Upon conclusion the knee was inspected and found that no purulent fluid remained in no purulent or necrotic tissue remained.  The tourniquet was then deflated and hemostasis was obtained with electrocautery.  The wound was closed in layered fashion using 0 PDS suture to close the arthrotomy followed by 2-0 PDS suture for the subcutaneous layer and 3-0 nylon for skin.  An Aquacel dressing was then placed over the incision.    Once complete, the patient was then awakened from anesthesia and transferred  to the PACU in stable condition    No apparent complications occurred during the procedure Instrument, sponge and needle counts were correct x 2.     POST OPERATIVE PLAN:   Weight Bearing: As tolerated through all extremities with range of motion as tolerated   DVT prophylaxis: Per primary team   Therapy/Activity: PT/OT for mobilization and durable equipment needs   Wound Care: Dressings remain in place for 7 days   Pain control: IV and by mouth pain medications as needed.   Continue broad-spectrum antibiotic coverage.  Follow up intraoperative cultures.  Infectious disease consult from [facility]   Social work for discharge planning   Follow up: Dr. Ortiz 2 weeks for wound check and suture removal

## 2017-10-08 NOTE — CONSULTS
Orthopedic Consult      Patient: Richard Guevara    Date of Admission: 10/7/2017  8:33 PM    YOB: 1960    Medical Record Number: [id number]    Attending Physician: Dayanna Berg MD    Consulting Physician: Addy Ortiz MD      Chief Complaint(s): Bilateral wrist pain, left knee pain      History of Present Illness: 57 y.o. male who presented to [facility] ER complaining of bilateral wrist pain and left knee pain.  Patient has a known history of rheumatoid arthritis and has recently been unable to maintain his rheumatoid medication intake as a result of various social issues.  As a result, over the past 3-4 weeks, he has developed worsening pain in the bilateral wrists and left knee.  As a result of these difficulties, he has had significant difficulties mobilizing and requires the use of a walker.  He has areas of redness overlying the left forearm and wrist area which she states is due to supporting himself on a walker.  After contacting his family practice physician, he was instructed to go to his local ER due to concern for potential sepsis of his knee and wrist joints.  Patient notes no recent illnesses but states that he did have a flu approximately a month ago and that the swelling of these joints began shortly after his flu symptoms improved.  He has noticed worsening knee motion.  He denies any fevers, chills, constitutional symptoms.  He has recently resumed his methotrexate and states that his pain has somewhat improved over the last 2 days since resuming that medication.       No Known Allergies     Home Medications:  Prescriptions Prior to Admission   Medication Sig Dispense Refill Last Dose   • amitriptyline (ELAVIL) 75 MG tablet Take  by mouth Every Night.      • aspirin 81 MG tablet Take  by mouth Daily.      • atorvastatin (LIPITOR) 40 MG tablet Take 40 mg by mouth Daily.      • carvedilol CR (COREG CR) 40 MG 24 hr capsule Take 40 mg by mouth Daily.      • citalopram (CeleXA) 20 MG  tablet Take 20 mg by mouth Daily.      • clopidogrel (PLAVIX) 75 MG tablet Take 75 mg by mouth Daily.      • colchicine 0.6 MG tablet Take 0.6 mg by mouth Daily.      • diltiaZEM CD (CARTIA XT) 120 MG 24 hr capsule Take 120 mg by mouth Daily.      • docusate sodium (COLACE) 100 MG capsule Take 1 tablet by mouth Daily.      • esomeprazole (nexIUM) 40 MG capsule Take 40 mg by mouth Every Morning Before Breakfast.      • folic acid-vit B6-vit B12 (FOLGARD) 2.2-25-1 MG tablet tablet Take  by mouth Daily.      • loratadine (CLARITIN) 10 MG tablet Take 10 mg by mouth Daily.      • predniSONE (DELTASONE) 5 MG tablet Take 5 mg by mouth Daily.      • raNITIdine (ZANTAC) 150 MG tablet Take 150 mg by mouth 2 (Two) Times a Day.      • salsalate (DISALCID) 750 MG tablet Take 750 mg by mouth 2 (Two) Times a Day.      • traMADol (ULTRAM) 50 MG tablet Take 50 mg by mouth Every 6 (Six) Hours As Needed for Moderate Pain .          Current Medications:  Scheduled Meds:    [MAR Hold] Pharmacy Consult  Does not apply Once   [MAR Hold] amitriptyline 75 mg Oral Nightly   [MAR Hold] aspirin 81 mg Oral Daily   [MAR Hold] atorvastatin 40 mg Oral Nightly   carvedilol 12.5 mg Oral Q12H   [MAR Hold] cetirizine 10 mg Oral Nightly   [MAR Hold] citalopram 20 mg Oral Daily   [MAR Hold] clopidogrel 75 mg Oral Daily   [MAR Hold] colchicine 0.6 mg Oral Daily   diltiaZEM  mg Oral Daily   [MAR Hold] docusate sodium 100 mg Oral Daily   famotidine 20 mg Oral BID   [MAR Hold] ketorolac 15 mg Intravenous Q6H   [MAR Hold] pantoprazole 40 mg Oral Q AM   [MAR Hold] piperacillin-tazobactam 4.5 g Intravenous Q8H   [MAR Hold] vancomycin 15 mg/kg Intravenous Q12H     Continuous Infusions:    Pharmacy to dose vancomycin     sodium chloride 100 mL/hr Last Rate: 100 mL/hr (10/08/17 3623)     PRN Meds:.[MAR Hold] acetaminophen  •  [MAR Hold] diclofenac  •  influenza vaccine  •  [MAR Hold] magnesium sulfate **OR** [MAR Hold] magnesium sulfate **OR** [MAR Hold]  magnesium sulfate  •  [MAR Hold] melatonin  •  [MAR Hold] ondansetron  •  [MAR Hold] oxyCODONE  •  Pharmacy to dose vancomycin  •  potassium chloride **OR** potassium chloride **OR** potassium chloride  •  [MAR Hold] sodium chloride  •  Insert peripheral IV **AND** [MAR Hold] sodium chloride  •  [MAR Hold] traMADol    Past Medical History:   Diagnosis Date   • Altered mental status 10/8/2017   • CAD (coronary artery disease) 10/8/2017   • Erythematous condition 10/8/2017   • Gout 10/8/2017   • HLD (hyperlipidemia) 10/8/2017   • Hyperlipidemia    • Hypertension    • Immobility 10/8/2017   • Joint erythema 10/8/2017   • Joint pain 10/8/2017   • RA (rheumatoid arthritis)    • Rheumatoid arthritis 10/8/2017   • Tachycardia 10/8/2017   • Weakness 10/8/2017        Past Surgical History:   Procedure Laterality Date   • CORONARY ANGIOPLASTY WITH STENT PLACEMENT      x1, around 2007 edith   • SKIN BIOPSY      hx a few suspicious spots removed, only required removal, unsure of result        Social History     Occupational History   • Not on file.     Social History Main Topics   • Smoking status: Current Every Day Smoker     Packs/day: 1.00   • Smokeless tobacco: Never Used   • Alcohol use No   • Drug use: No   • Sexual activity: Defer      Social History     Social History Narrative    Mr. Guevara is a 57 year old white  male. He has 2 children. He has been disabled from a younger age r/t advanced rheumatoid arthritis. He does not have an advanced directive.        Family History   Problem Relation Age of Onset   • Osteoporosis Mother    • COPD Father    • Rheum arthritis Father    • Arthritis Sister    • Osteoporosis Sister        Review of Systems:   General: negative for - chills, fatigue, fever, malaise or night sweats  Psychological: negative for - behavioral disorder, hostility, irritability, mood swings, obsessive thoughts or suicidal ideation  Ophthalmic: negative for - blurry vision, double vision or eye  "pain  HEENT: negative for - headaches, hearing change, sinus pain, sore throat or visual changes  Hematological and Lymphatic: negative for - bleeding problems, jaundice, night sweats, pallor or swollen lymph nodes  Endocrine: negative for - malaise/lethargy, mood swings, palpitations, polydipsia/polyuria, skin changes or unexpected weight changes  Respiratory: negative for - cough, shortness of breath or wheezing  Cardiovascular: negative for - chest pain, dyspnea on exertion, palpitations or shortness of breath  Gastrointestinal: negative for - abdominal pain, change in bowel habits, change in stools, constipation, gas/bloating or stool incontinence  Genito-Urinary: negative for - dysuria, genital discharge, nocturia, pelvic pain or scrotal mass/pain  Musculoskeletal: positive for - joint pain  Neurological: negative for - behavioral changes, headaches, speech problems or visual changes  Dermatological: negative for hair changes, lumps, pruritus and skin lesion changes    Physical Exam: 57 y.o. male    GENERAL APPEARANCE: awake, alert & oriented x 3, in no acute distress, well developed, well nourished and obese  Vitals:    10/08/17 0230 10/08/17 0428 10/08/17 0726 10/08/17 0843   BP: 144/94  155/90 139/97   BP Location: Right arm  Left arm    Patient Position: Lying  Lying    Pulse: 109  118 113   Resp: 18  17    Temp:  99.1 °F (37.3 °C) 97.5 °F (36.4 °C)    TempSrc:  Oral Oral    SpO2:   94%    Weight: 223 lb 8 oz (101 kg)      Height: 72\" (182.9 cm)        PSYCH: normal affect  HEAD: Normocephalic, without obvious abnormality, atraumatic  EYES: No icterus, corneas clear, PERRLA  CARDIOVASCULAR: pulses palpable and equal bilaterally. Capillary refill less than 2 seconds  LUNGS:  breathing nonlabored  ABDOMEN: Soft, nontender, nondistended  BACK: No C-T- L spine tenderness  EXTREMITIES: no clubbing, cyanosis  MUSCULOSKELETAL:    Left upper extremity: Full painless range of motion of shoulder, elbow, and digits.  " There is mild pain with wrist range of motion.  Wrist range of motion is decreased secondary to swelling There is focal swelling about the volar radial and dorsal ulnar aspects of the wrist.  He is nontender to palpation throughout the remainder of the extremity.  He does have tenderness to palpation about the wrist joint.  Intact EPL, FPL, EDC, FDP, FDS, interosseous, wrist flexion, wrist extension, biceps, triceps, and deltoid. Sensation intact light touch to median, radial, ulnar, and axillary nerves. Palpable radial pulse.  He does have a focal area of redness overlying the left forearm and ulnar styloid region.    Right upper extremity: Full painless range of motion of shoulder, elbow, and digits.  There is mild pain with wrist range of motion.  Wrist range of motion is decreased secondary to swelling.  There is focal swelling about the volar radial and dorsal ulnar aspects of the wrist.  He is nontender to palpation throughout the remainder of the extremity.  He does have tenderness to palpation about the wrist joint.  Intact EPL, FPL, EDC, FDP, FDS, interosseous, wrist flexion, wrist extension, biceps, triceps, and deltoid. Sensation intact light touch to median, radial, ulnar, and axillary nerves. Palpable radial pulse.  Skin is intact without significant lesions or wounds.    Pelvis: Stable to AP and lateral compression.    Left lower extremity: Full, painless range of motion of hip, ankle, toes. Range of motion is limited at knee secondary to pain.  Range of motion is  degrees.  He has a moderate joint effusion.  There is no overlying erythema over the knee joint.  There is a slight warmth to the knee joint.  He is focally tender to palpation around the knee.  Nontender palpation throughout the remainder of the extremity.  Intact EHL, FHL, tibialis anterior, and gastrocsoleus. Sensation intact to light touch to deep peroneal, superficial peroneal, sural, saphenous, tibial nerves. Palpable DP and PT  pulses. Skin - no obvious lesions or defects. Edema - none.    Right lower extremity: Full, painless range of motion of hip, knee, ankle, toes.  Nontender to palpation throughout the extremity.  Intact EHL, FHL, tibialis anterior, and gastrocsoleus. Sensation intact to light touch to deep peroneal, superficial peroneal, sural, saphenous, tibial nerves. Palpable DP and PT pulses. Skin - intact. Edema - none.      Diagnostic Tests:    Admission on 10/07/2017   Component Date Value Ref Range Status   • Extra Tube 10/07/2017 hold for add-on   Final    Auto resulted   • Extra Tube 10/07/2017 Hold for add-ons.   Final    Auto resulted.   • Extra Tube 10/07/2017 hold for add-on   Final    Auto resulted   • Extra Tube 10/07/2017 Hold for add-ons.   Final    Auto resulted.   • Glucose 10/07/2017 137* 70 - 100 mg/dL Final   • BUN 10/07/2017 14  9 - 23 mg/dL Final   • Creatinine 10/07/2017 0.70  0.60 - 1.30 mg/dL Final   • Sodium 10/07/2017 137  132 - 146 mmol/L Final   • Potassium 10/07/2017 3.4* 3.5 - 5.5 mmol/L Final   • Chloride 10/07/2017 106  99 - 109 mmol/L Final   • CO2 10/07/2017 24.0  20.0 - 31.0 mmol/L Final   • Calcium 10/07/2017 9.6  8.7 - 10.4 mg/dL Final   • Total Protein 10/07/2017 7.0  5.7 - 8.2 g/dL Final   • Albumin 10/07/2017 3.50  3.20 - 4.80 g/dL Final   • ALT (SGPT) 10/07/2017 100* 7 - 40 U/L Final   • AST (SGOT) 10/07/2017 42* 0 - 33 U/L Final   • Alkaline Phosphatase 10/07/2017 115* 25 - 100 U/L Final   • Total Bilirubin 10/07/2017 0.4  0.3 - 1.2 mg/dL Final   • eGFR Non  Amer 10/07/2017 117  >60 mL/min/1.73 Final   • Globulin 10/07/2017 3.5  gm/dL Final   • A/G Ratio 10/07/2017 1.0* 1.5 - 2.5 g/dL Final   • BUN/Creatinine Ratio 10/07/2017 20.0  7.0 - 25.0 Final   • Anion Gap 10/07/2017 7.0  3.0 - 11.0 mmol/L Final   • PTT 10/07/2017 26.1  24.0 - 31.0 seconds Final   • Protime 10/07/2017 12.0* 9.6 - 11.5 Seconds Final   • INR 10/07/2017 1.10   Final   • Lactate 10/07/2017 1.5  0.5 - 2.0 mmol/L  Final    Falsely depressed results may occur on samples drawn from patients receiving N-Acetylcysteine (NAC) or Metamizole.   • Procalcitonin 10/07/2017 0.16  ng/mL Final   • Uric Acid 10/07/2017 5.5  3.7 - 9.2 mg/dL Final    Falsely depressed results may occur on samples drawn from patients receiving N-Acetylcysteine (NAC) or Metamizole.   • C-Reactive Protein 10/07/2017 9.22* 0.00 - 1.00 mg/dL Final   • Sed Rate 10/07/2017 95* 0 - 20 mm/hr Final   • WBC 10/07/2017 4.94  3.50 - 10.80 10*3/mm3 Final   • RBC 10/07/2017 4.31  4.20 - 5.76 10*6/mm3 Final   • Hemoglobin 10/07/2017 14.9  13.1 - 17.5 g/dL Final   • Hematocrit 10/07/2017 43.3  38.9 - 50.9 % Final   • MCV 10/07/2017 100.5* 80.0 - 99.0 fL Final   • MCH 10/07/2017 34.6* 27.0 - 31.0 pg Final   • MCHC 10/07/2017 34.4  32.0 - 36.0 g/dL Final   • RDW 10/07/2017 13.9  11.3 - 14.5 % Final   • RDW-SD 10/07/2017 50.6  37.0 - 54.0 fl Final   • MPV 10/07/2017 9.4  6.0 - 12.0 fL Final   • Platelets 10/07/2017 347  150 - 450 10*3/mm3 Final   • Neutrophil % 10/07/2017 51.8  41.0 - 71.0 % Final   • Lymphocyte % 10/07/2017 30.8  24.0 - 44.0 % Final   • Monocyte % 10/07/2017 14.4* 0.0 - 12.0 % Final   • Eosinophil % 10/07/2017 1.8  0.0 - 3.0 % Final   • Basophil % 10/07/2017 0.6  0.0 - 1.0 % Final   • Immature Grans % 10/07/2017 0.6  0.0 - 0.6 % Final   • Neutrophils, Absolute 10/07/2017 2.56  1.50 - 8.30 10*3/mm3 Final   • Lymphocytes, Absolute 10/07/2017 1.52  0.60 - 4.80 10*3/mm3 Final   • Monocytes, Absolute 10/07/2017 0.71  0.00 - 1.00 10*3/mm3 Final   • Eosinophils, Absolute 10/07/2017 0.09  0.00 - 0.30 10*3/mm3 Final   • Basophils, Absolute 10/07/2017 0.03  0.00 - 0.20 10*3/mm3 Final   • Immature Grans, Absolute 10/07/2017 0.03  0.00 - 0.03 10*3/mm3 Final   • nRBC 10/07/2017 0.0  0.0 - 0.0 /100 WBC Final   • Troponin I 10/07/2017 0.03  0.00 - 0.07 ng/mL Final    Serial Number: [id number]  [id number]   • Crystals, Fluid 10/07/2017 No crystals seen   Final   • Gram  Stain Result 10/08/2017 Many (4+) WBCs seen   Preliminary   • Gram Stain Result 10/08/2017 No organisms seen   Preliminary   • Color, Fluid 10/07/2017 Brown   Final   • Appearance, Fluid 10/07/2017 Turbid* Clear Final   • WBC, Fluid 10/07/2017 174.200  /mm3 Corrected    Corrected result. Previous result was 174 /mm3 on 10/8/2017 at 0212 EDT   • RBC, Fluid 10/07/2017 69899  /mm3 Final   • Gram Stain Result 10/08/2017 Many (4+) WBCs seen   Preliminary   • Gram Stain Result 10/08/2017 No organisms seen   Preliminary   • Crystals, Fluid 10/07/2017 No crystals seen   Final   • Color, Fluid 10/07/2017 Red   Final   • Appearance, Fluid 10/07/2017 Turbid* Clear Final   • WBC, Fluid 10/07/2017 137.000  /mm3 Final    Specimen clotted   • RBC, Fluid 10/07/2017 783779  /mm3 Final    Specimen clotted   • Gram Stain Result 10/08/2017 Many (4+) WBCs seen   Preliminary   • Gram Stain Result 10/08/2017 No organisms seen   Preliminary   • Crystals, Fluid 10/07/2017 No crystals seen   Final   • Color, Fluid 10/07/2017 Yellow   Final   • Appearance, Fluid 10/07/2017 Turbid* Clear Final   • WBC, Fluid 10/07/2017 201.000  /mm3 Corrected   • RBC, Fluid 10/07/2017 67393  /mm3 Final   • Neutrophils, Fluid 10/07/2017 94  % Final    Appended report.  These results have been appended to a previously final verified report.   • Lymphocytes, Fluid 10/07/2017 1  % Final    Appended report.  These results have been appended to a previously final verified report.   • Monocytes, Fluid 10/07/2017 5  % Final    Appended report.  These results have been appended to a previously final verified report.   • Neutrophils, Fluid 10/07/2017 95  % Final    Appended report.  These results have been appended to a previously final verified report.   • Lymphocytes, Fluid 10/07/2017 1  % Final    Appended report.  These results have been appended to a previously final verified report.   • Monocytes, Fluid 10/07/2017 3  % Final    Appended report.  These results have  been appended to a previously final verified report.   • Mesothelial Cells, Fluid 10/07/2017 1  % Final    Appended report.  These results have been appended to a previously final verified report.   • Neutrophils, Fluid 10/07/2017 95  % Final   • Lymphocytes, Fluid 10/07/2017 1  % Final   • Monocytes, Fluid 10/07/2017 3  % Final   • Macrophage, Fluid 10/07/2017 1  % Final   • Magnesium 10/08/2017 1.7  1.3 - 2.7 mg/dL Final   • Hemoglobin A1C 10/08/2017 5.80* 4.80 - 5.60 % Final   • Free T4 10/08/2017 1.15  0.89 - 1.76 ng/dL Final   • BNP 10/08/2017 5.0  0.0 - 100.0 pg/mL Final   • WBC 10/08/2017 4.99  3.50 - 10.80 10*3/mm3 Final   • RBC 10/08/2017 3.74* 4.20 - 5.76 10*6/mm3 Final   • Hemoglobin 10/08/2017 12.8* 13.1 - 17.5 g/dL Final   • Hematocrit 10/08/2017 38.0* 38.9 - 50.9 % Final   • MCV 10/08/2017 101.6* 80.0 - 99.0 fL Final   • MCH 10/08/2017 34.2* 27.0 - 31.0 pg Final   • MCHC 10/08/2017 33.7  32.0 - 36.0 g/dL Final   • RDW 10/08/2017 14.0  11.3 - 14.5 % Final   • RDW-SD 10/08/2017 51.3  37.0 - 54.0 fl Final   • MPV 10/08/2017 9.5  6.0 - 12.0 fL Final   • Platelets 10/08/2017 308  150 - 450 10*3/mm3 Final   • Neutrophil % 10/08/2017 49.9  41.0 - 71.0 % Final   • Lymphocyte % 10/08/2017 30.5  24.0 - 44.0 % Final   • Monocyte % 10/08/2017 15.8* 0.0 - 12.0 % Final   • Eosinophil % 10/08/2017 2.8  0.0 - 3.0 % Final   • Basophil % 10/08/2017 0.4  0.0 - 1.0 % Final   • Immature Grans % 10/08/2017 0.6  0.0 - 0.6 % Final   • Neutrophils, Absolute 10/08/2017 2.49  1.50 - 8.30 10*3/mm3 Final   • Lymphocytes, Absolute 10/08/2017 1.52  0.60 - 4.80 10*3/mm3 Final   • Monocytes, Absolute 10/08/2017 0.79  0.00 - 1.00 10*3/mm3 Final   • Eosinophils, Absolute 10/08/2017 0.14  0.00 - 0.30 10*3/mm3 Final   • Basophils, Absolute 10/08/2017 0.02  0.00 - 0.20 10*3/mm3 Final   • Immature Grans, Absolute 10/08/2017 0.03  0.00 - 0.03 10*3/mm3 Final   • Glucose 10/08/2017 86  70 - 100 mg/dL Final   • BUN 10/08/2017 15  9 - 23 mg/dL  Final   • Creatinine 10/08/2017 0.70  0.60 - 1.30 mg/dL Final   • Sodium 10/08/2017 137  132 - 146 mmol/L Final   • Potassium 10/08/2017 3.1* 3.5 - 5.5 mmol/L Final   • Chloride 10/08/2017 104  99 - 109 mmol/L Final   • CO2 10/08/2017 23.0  20.0 - 31.0 mmol/L Final   • Calcium 10/08/2017 9.1  8.7 - 10.4 mg/dL Final   • Total Protein 10/08/2017 5.8  5.7 - 8.2 g/dL Final   • Albumin 10/08/2017 3.10* 3.20 - 4.80 g/dL Final   • ALT (SGPT) 10/08/2017 92* 7 - 40 U/L Final   • AST (SGOT) 10/08/2017 40* 0 - 33 U/L Final   • Alkaline Phosphatase 10/08/2017 93  25 - 100 U/L Final   • Total Bilirubin 10/08/2017 0.4  0.3 - 1.2 mg/dL Final   • eGFR Non  Amer 10/08/2017 117  >60 mL/min/1.73 Final   • Globulin 10/08/2017 2.7  gm/dL Final   • A/G Ratio 10/08/2017 1.1* 1.5 - 2.5 g/dL Final   • BUN/Creatinine Ratio 10/08/2017 21.4  7.0 - 25.0 Final   • Anion Gap 10/08/2017 10.0  3.0 - 11.0 mmol/L Final   • Lactate 10/08/2017 0.9  0.5 - 2.0 mmol/L Final    Falsely depressed results may occur on samples drawn from patients receiving N-Acetylcysteine (NAC) or Metamizole.   • TSH 10/08/2017 2.630  0.350 - 5.350 mIU/mL Final   • Procalcitonin 10/08/2017 0.11  ng/mL Final       Xr Wrist 3+ View Left    Result Date: 10/8/2017  Narrative: EXAM:   XR Left Wrist Complete, 3 or More Views CLINICAL HISTORY:   57 years old, male; Pain; Wrist; Left; Additional info: Ra with swelling and pain TECHNIQUE:   Frontal, lateral and oblique views of the left wrist. COMPARISON:   No relevant prior studies available. FINDINGS:   Bones/joints: No fracture. Severe degenerative changes consistent with rheumatoid arthritis (worse on the left wrist) with mild palmar subluxation of the carpal bones in relation to the radius and ulna.  Carpal joint space narrowing.  Severe radiocarpal joint space narrowing.  Second and third metacarpophalangeal periarticular lucencies.  No acute fracture.   Soft tissues: Soft tissue swelling of the wrist.  No radiopaque  foreign body.     Impression:   Severe degenerative changes consistent with rheumatoid arthritis (worse on the left wrist) with mild palmar subluxation of the carpal bones in relation to the radius and ulna. THIS DOCUMENT HAS BEEN ELECTRONICALLY SIGNED BY ARMAND AMEZQUITA MD    Xr Wrist 3+ View Right    Addendum Date: 10/8/2017 Addendum:   IMPRESSION:     1.  No acute fracture. 2.  Degenerative changes of the right wrist consistent with rheumatoid arthritis. THIS DOCUMENT HAS BEEN ELECTRONICALLY SIGNED BY ARMAND AMEZQUITA MD    Result Date: 10/8/2017  Narrative: EXAM:   XR Right Wrist Complete, 3 or More Views CLINICAL HISTORY:   57 years old, male; Pain; Wrist; Right; Additional info: Ra with swelling and pain TECHNIQUE:   Frontal, lateral and oblique views of the right wrist. COMPARISON:   Left wrist radiograph same date FINDINGS:   Bones/joints:  No acute fracture.  Radiocarpal joint space narrowing.  Ulnocarpal joint space narrowing.  Diffuse carpal joint space narrowing.  Carpal periarticular lucencies consistent with rheumatoid arthritis.  Fifth carpometacarpal periarticular lucencies.  First and fifth metacarpophalangeal periarticular lucencies.  No dislocation.   Soft tissues:  Unremarkable.  No radiopaque foreign body.     Impression: 1.  No acute fracture. 2.  Degenerative changes of the right wrist consistent with rheumatoid arthritis. THIS DOCUMENT HAS BEEN ELECTRONICALLY SIGNED BY ARMAND AMEZQUITA MD    Xr Knee 1 Or 2 View Left    Result Date: 10/8/2017  Narrative: EXAM:   XR Left Knee, 1 or 2 views CLINICAL HISTORY:   57 years old, male; Pain; Knee; Left; Additional info: Ra with swelling and pain TECHNIQUE:   Frontal and/or lateral views of the left knee. COMPARISON:   No relevant prior studies available. FINDINGS:   Bones/joints:  Severe tricompartmental joint space narrowing, worst laterally.  No acute fracture.  No dislocation.   Soft tissues:  Soft tissue swelling at the knee.  Small suprapatellar effusion.      Impression: 1.  Severe tricompartmental joint space narrowing, worst laterally. 2.  Soft tissue swelling at the knee.  Small suprapatellar effusion. THIS DOCUMENT HAS BEEN ELECTRONICALLY SIGNED BY ARMAND AMEZQUITA MD    Xr Chest 1 View    Result Date: 10/7/2017  Narrative: EXAM:   XR Chest, 1 View CLINICAL HISTORY:   57 years old, male; Pain; Chest pain; Type not specified; Additional info: Redness and swelling of left forearm, Tachycardia, rheumatoid arthritis TECHNIQUE:   Frontal view of the chest. COMPARISON:   [facility] 6/4/2014 12:51:11 PM FINDINGS:   Lungs:  Unremarkable.  No consolidation.   Pleural space:  Unremarkable.  No pneumothorax.   Heart:  Unremarkable.  No cardiomegaly.   Mediastinum:  Unremarkable.   Bones/joints:  Old healed right posterior rib fractures.     Impression:   No acute abnormality. THIS DOCUMENT HAS BEEN ELECTRONICALLY SIGNED BY ARMAND AMEZQUITA MD        Assessment:  Bilateral wrist pain and swelling  Left knee pain and swelling        Plan:  Patient's history and examination findings are concerning for possible septic arthritis versus arthritic exacerbation.  In order to better determine the etiology of his ongoing pain, I recommended we proceed with aspirations of the bilateral wrist joints and left knee joint to evaluate for potential septic arthritis.     Procedure Note:  I discussed with the patient the potential benefits of performing a diagnostic aspiration of the left knee as well as potential risks including but not limited to infection, swelling, pain, bleeding, bruising, nerve and/or vessel damage. After informed consent and after the area was prepped with sterile betadine, an 18-gauge needle was inserted into the knee joint via the superolateral approach.  10 cc of cloudy yellow joint fluid was obtained and sent for the following studies: aerobic/anaerobic culture, gram stain, cell count with differential, and crystals. Upon completion, the needle was  withdrawn and a sterile dressing was placed over the aspiration site. The patient tolerated the procedure well. There were no apparent complications.    Procedure Note:  I discussed with the patient the potential benefits of performing a diagnostic aspiration of the right wrist as well as potential risks including but not limited to infection, swelling, pain, bleeding, bruising, nerve and/or vessel damage. After informed consent and after the area was prepped with sterile betadine, an 18-gauge needle was inserted into the wrist joint via the dorsolateral approach.  7 cc of cloudy yellow joint fluid was obtained and sent for the following studies: aerobic/anaerobic culture, gram stain, cell count with differential, and crystals. Upon completion, the needle was withdrawn and a sterile dressing was placed over the aspiration site. The patient tolerated the procedure well. There were no apparent complications.    Procedure Note:  I discussed with the patient the potential benefits of performing a diagnostic aspiration of the left wrist as well as potential risks including but not limited to infection, swelling, pain, bleeding, bruising, nerve and/or vessel damage. After informed consent and after the area was prepped with sterile betadine, an 18-gauge needle was inserted into the wrist joint via the superolateral approach.  5 cc of cloudy yellow joint fluid was obtained and sent for the following studies: aerobic/anaerobic culture, gram stain, cell count with differential, and crystals. Upon completion, the needle was withdrawn and a sterile dressing was placed over the aspiration site. The patient tolerated the procedure well. There were no apparent complications.    I discussed with patient that if the aspirate results are concerning for infection, the patient will need to go to the OR for arthrotomy of the affected joints and irrigation and debridement.  Otherwise, if aspirates are not concerning for infection, I  recommended symptomatic treatment with anti-inflammatories and continuing anti-rheumatoid medication.  Aspirate results revealed greater than 100,000 white blood cells in all 3 aspirated joints with greater than 95% neutrophils.  I explained to the patient that this is likely consistent with septic arthritis and recommended to proceed with surgical arthrotomy.    The patient has clinical and radiographic evidence of septic arthritis of bilateral wrist joints and left knee joint which is severely restricting the patient's activities as well as quality of life. I had an extensive discussion with the patient/family regarding treatment options taking into account the nature of the diagnosis, the patient's prior level of cognitive and physical function, medical comorbidities, and goals for treatment.  The recommendation at this time is to proceed with a surgical arthrotomy with irrigation and debridement of bilateral wrist joints and left knee joint with the goal to improve patient function, decrease pain, and improve mobility. The risks, benefits, potential complications, and alternatives were discussed with the patient in detail. Risks included but were not limited to bleeding, infection, anesthesia risks, damage to neurovascular structures, ongoing joint pain and swelling, joint stiffness, pain, continued pain, iatrogenic fracture, possible need for future surgery including the potential for amputation, blood clots, myocardial infarction, stroke, and death. Brianda-operative blood management and the potential for blood transfusion were discussed with risks and options clearly outlined. Specific details of the surgical procedure, hospitalization, recovery, rehabilitation, and long-term precautions were also presented. Pre-operative teaching was provided. Given this instruction, the patient elected to proceed with the surgical arthrotomy of bilateral wrist joints and left knee joint with irrigation and debridement.      Nothing by mouth  Plan for OR today  Obtain informed consent            Date: 10/8/2017    Addy Ortiz MD

## 2017-10-08 NOTE — ANESTHESIA PROCEDURE NOTES
Airway  Urgency: elective    Date/Time: 10/8/2017 10:28 AM  Airway not difficult    General Information and Staff    Patient location during procedure: OR  Anesthesiologist: DAVID FLOREZ    Indications and Patient Condition  Indications for airway management: airway protection    Preoxygenated: yes  Mask difficulty assessment: 2 - vent by mask + OA or adjuvant +/- NMBA    Final Airway Details  Final airway type: endotracheal airway      Successful airway: ETT  Cuffed: yes   Successful intubation technique: video laryngoscopy  Facilitating devices/methods: intubating stylet  Endotracheal tube insertion site: oral  Blade size: #3  ETT size: 7.0 mm  Cormack-Lehane Classification: grade I - full view of glottis  Placement verified by: chest auscultation and capnometry   Cuff volume (mL): 8  Measured from: lips  ETT to lips (cm): 22  Number of attempts at approach: 1

## 2017-10-08 NOTE — PROGRESS NOTES
Pharmacokinetic Consult - Vancomycin Dosing    Richard Guevara is a 57 y.o. male who has been consulted for vancomycin dosing for bone and joint infection.    Relevant clinical data and objective history reviewed:  Creatinine   Date Value Ref Range Status   10/07/2017 0.70 0.60 - 1.30 mg/dL Final     BUN   Date Value Ref Range Status   10/07/2017 14 9 - 23 mg/dL Final     Estimated Creatinine Clearance: 145 mL/min (by C-G formula based on Cr of 0.7).  Lab Results   Component Value Date/Time    WBC 4.94 10/07/2017 09:03 PM    HGB 14.9 10/07/2017 09:03 PM    HCT 43.3 10/07/2017 09:03 PM    .5 (H) 10/07/2017 09:03 PM     10/07/2017 09:03 PM     Temp Readings from Last 3 Encounters:   10/08/17 99.1 °F (37.3 °C) (Oral)   06/04/14 98 °F (36.7 °C)       Assessment/Plan  The patient got a 2000mg dose in the ED. Will initiate maintenance dose at 1500 mg IV every 12 hours.  Plan for trough as patient approaches steady state, prior to the 4th dose.  Due to infection severity, will target a trough of 15-20.     Pharmacy will continue to follow the patient’s culture results and clinical progress daily.    Saurabh Ramirez, MartaD

## 2017-10-08 NOTE — PLAN OF CARE
Problem: Patient Care Overview (Adult)  Goal: Plan of Care Review  Outcome: Ongoing (interventions implemented as appropriate)    10/08/17 1717   Coping/Psychosocial Response Interventions   Plan Of Care Reviewed With patient;daughter;family   Patient Care Overview   Progress progress towards functional goals is fair   Outcome Evaluation   Outcome Summary/Follow up Plan pt back from procedure this pm. He has had c/o pain controlled with prn. no c/o of soa or nause. pt is currently on 1 lpm nc. pt family at bedside. vss. no new concerns at this time,        Goal: Adult Individualization and Mutuality  Outcome: Ongoing (interventions implemented as appropriate)    10/08/17 1717   Individualization   Patient Specific Preferences family at bedside, pills one at a time with large pills   Patient Specific Goals home at d/c, out of bed   Patient Specific Interventions enc ambulation, enc therapies, ens IS   Mutuality/Individual Preferences   What Anxieties, Fears or Concerns Do You Have About Your Health or Care? none   What Questions Do You Have About Your Health or Care? none   What Information Would Help Us Give You More Personalized Care? none         Problem: Pain, Acute (Adult)  Intervention: Monitor/Manage Analgesia    10/08/17 1620 10/08/17 1717   Manage Acute Burn Pain   Bowel Intervention --  ambulation promoted;privacy promoted   Pain Management Interventions medicated;painful stimuli minimized --    Safety Interventions   Medication Review/Management --  medications reviewed       Intervention: Mutually Develop/Implement Acute Pain Management Plan    10/08/17 1620 10/08/17 1717   Manage Acute Burn Pain   Pain Management Interventions medicated;painful stimuli minimized --    Cognitive Interventions   Sensory Stimulation Regulation --  care clustered;quiet environment promoted       Intervention: Support/Optimize Psychosocial Response to Acute Pain    10/08/17 0810 10/08/17 1717   Coping Strategies   Trust  Relationship/Rapport --  care explained   Diversional Activities --  television   Family/Support System Care --  presence promoted   Supportive Measures active listening utilized;positive reinforcement provided --          Goal: Acceptable Pain Control/Comfort Level  Outcome: Ongoing (interventions implemented as appropriate)    10/08/17 1717   Pain, Acute (Adult)   Acceptable Pain Control/Comfort Level making progress toward outcome         Problem: Skin Integrity Impairment, Risk/Actual (Adult)  Intervention: Promote/Optimize Nutrition    10/08/17 1717   Hygiene Care Assistance   Oral Care oral rinse provided   Nutrition Interventions   Oral Nutrition Promotion physical activity promoted;rest periods promoted       Intervention: Prevent/Manage Excess Moisture    10/08/17 1500   Hygiene Care   Bathing/Skin Care bath, complete;dressed/undressed       Intervention: Prevent/Minimize Sheer/Friction Injuries    10/08/17 1717   Skin Interventions   Pressure Reduction Techniques frequent weight shift encouraged   Positioning   Positioning/Transfer Devices in use;pillows         Goal: Identify Related Risk Factors and Signs and Symptoms  Outcome: Ongoing (interventions implemented as appropriate)    10/08/17 1717   Skin Integrity Impairment, Risk/Actual   Skin Integrity Impairment, Risk/Actual: Related Risk Factors edema;surgery/procedure   Signs and Symptoms (Skin Integrity Impairment) edema       Goal: Skin Integrity/Wound Healing  Outcome: Ongoing (interventions implemented as appropriate)    10/08/17 1717   Skin Integrity Impairment, Risk/Actual (Adult)   Skin Integrity/Wound Healing making progress toward outcome

## 2017-10-09 LAB
ANION GAP SERPL CALCULATED.3IONS-SCNC: 12 MMOL/L (ref 3–11)
BUN BLD-MCNC: 11 MG/DL (ref 9–23)
BUN/CREAT SERPL: 15.7 (ref 7–25)
CALCIUM SPEC-SCNC: 8.2 MG/DL (ref 8.7–10.4)
CHLORIDE SERPL-SCNC: 107 MMOL/L (ref 99–109)
CO2 SERPL-SCNC: 22 MMOL/L (ref 20–31)
CREAT BLD-MCNC: 0.7 MG/DL (ref 0.6–1.3)
GFR SERPL CREATININE-BSD FRML MDRD: 117 ML/MIN/1.73
GLUCOSE BLD-MCNC: 79 MG/DL (ref 70–100)
HCT VFR BLD AUTO: 36.7 % (ref 38.9–50.9)
HGB BLD-MCNC: 12 G/DL (ref 13.1–17.5)
MAGNESIUM SERPL-MCNC: 2.1 MG/DL (ref 1.3–2.7)
POTASSIUM BLD-SCNC: 3.3 MMOL/L (ref 3.5–5.5)
POTASSIUM BLD-SCNC: 4.3 MMOL/L (ref 3.5–5.5)
SODIUM BLD-SCNC: 141 MMOL/L (ref 132–146)
T3FREE SERPL-MCNC: 2.9 PG/ML (ref 2–4.4)
VANCOMYCIN TROUGH SERPL-MCNC: 11.9 MCG/ML (ref 10–20)

## 2017-10-09 PROCEDURE — 97110 THERAPEUTIC EXERCISES: CPT

## 2017-10-09 PROCEDURE — 25010000002 VANCOMYCIN HCL IN NACL 1.5-0.9 GM/500ML-% SOLUTION

## 2017-10-09 PROCEDURE — 25010000002 KETOROLAC TROMETHAMINE PER 15 MG: Performed by: NURSE PRACTITIONER

## 2017-10-09 PROCEDURE — 80048 BASIC METABOLIC PNL TOTAL CA: CPT | Performed by: ORTHOPAEDIC SURGERY

## 2017-10-09 PROCEDURE — 85018 HEMOGLOBIN: CPT | Performed by: ORTHOPAEDIC SURGERY

## 2017-10-09 PROCEDURE — 80202 ASSAY OF VANCOMYCIN: CPT

## 2017-10-09 PROCEDURE — 4A02X4A MEASUREMENT OF CARDIAC ELECTRICAL ACTIVITY, GUIDANCE, EXTERNAL APPROACH: ICD-10-PCS | Performed by: HOSPITALIST

## 2017-10-09 PROCEDURE — 97162 PT EVAL MOD COMPLEX 30 MIN: CPT

## 2017-10-09 PROCEDURE — 97166 OT EVAL MOD COMPLEX 45 MIN: CPT | Performed by: OCCUPATIONAL THERAPIST

## 2017-10-09 PROCEDURE — 83735 ASSAY OF MAGNESIUM: CPT | Performed by: INTERNAL MEDICINE

## 2017-10-09 PROCEDURE — 99233 SBSQ HOSP IP/OBS HIGH 50: CPT | Performed by: HOSPITALIST

## 2017-10-09 PROCEDURE — 25010000003 CEFAZOLIN IN DEXTROSE 2-4 GM/100ML-% SOLUTION: Performed by: ORTHOPAEDIC SURGERY

## 2017-10-09 PROCEDURE — 97530 THERAPEUTIC ACTIVITIES: CPT | Performed by: OCCUPATIONAL THERAPIST

## 2017-10-09 PROCEDURE — 84132 ASSAY OF SERUM POTASSIUM: CPT | Performed by: HOSPITALIST

## 2017-10-09 PROCEDURE — 25010000002 PIPERACILLIN-TAZOBACTAM: Performed by: NURSE PRACTITIONER

## 2017-10-09 PROCEDURE — 02HV33Z INSERTION OF INFUSION DEVICE INTO SUPERIOR VENA CAVA, PERCUTANEOUS APPROACH: ICD-10-PCS | Performed by: HOSPITALIST

## 2017-10-09 PROCEDURE — 85014 HEMATOCRIT: CPT | Performed by: ORTHOPAEDIC SURGERY

## 2017-10-09 RX ORDER — CEFTRIAXONE SODIUM 2 G/50ML
2 INJECTION, SOLUTION INTRAVENOUS EVERY 24 HOURS
Status: DISCONTINUED | OUTPATIENT
Start: 2017-10-09 | End: 2017-10-11 | Stop reason: HOSPADM

## 2017-10-09 RX ADMIN — KETOROLAC TROMETHAMINE 15 MG: 15 INJECTION, SOLUTION INTRAMUSCULAR; INTRAVENOUS at 05:52

## 2017-10-09 RX ADMIN — PANTOPRAZOLE SODIUM 40 MG: 40 TABLET, DELAYED RELEASE ORAL at 05:52

## 2017-10-09 RX ADMIN — KETOROLAC TROMETHAMINE 15 MG: 15 INJECTION, SOLUTION INTRAMUSCULAR; INTRAVENOUS at 00:03

## 2017-10-09 RX ADMIN — COLCHICINE 0.6 MG: 0.6 TABLET, FILM COATED ORAL at 09:03

## 2017-10-09 RX ADMIN — ASPIRIN 81 MG 81 MG: 81 TABLET ORAL at 08:55

## 2017-10-09 RX ADMIN — CLOPIDOGREL BISULFATE 75 MG: 75 TABLET ORAL at 09:01

## 2017-10-09 RX ADMIN — AMITRIPTYLINE HYDROCHLORIDE 75 MG: 50 TABLET, FILM COATED ORAL at 20:39

## 2017-10-09 RX ADMIN — OXYCODONE HYDROCHLORIDE 10 MG: 5 TABLET ORAL at 18:09

## 2017-10-09 RX ADMIN — CEFAZOLIN SODIUM 2 G: 2 INJECTION, SOLUTION INTRAVENOUS at 06:16

## 2017-10-09 RX ADMIN — OXYCODONE HYDROCHLORIDE 10 MG: 5 TABLET ORAL at 10:55

## 2017-10-09 RX ADMIN — VANCOMYCIN HCL-SODIUM CHLORIDE IV SOLN 1.5 GM/250ML-0.9% 1500 MG: 1.5-0.9/25 SOLUTION at 15:27

## 2017-10-09 RX ADMIN — CETIRIZINE HYDROCHLORIDE 10 MG: 10 TABLET, FILM COATED ORAL at 20:41

## 2017-10-09 RX ADMIN — VANCOMYCIN HCL-SODIUM CHLORIDE IV SOLN 1.5 GM/250ML-0.9% 1500 MG: 1.5-0.9/25 SOLUTION at 03:24

## 2017-10-09 RX ADMIN — FAMOTIDINE 20 MG: 20 TABLET ORAL at 09:04

## 2017-10-09 RX ADMIN — KETOROLAC TROMETHAMINE 15 MG: 15 INJECTION, SOLUTION INTRAMUSCULAR; INTRAVENOUS at 16:54

## 2017-10-09 RX ADMIN — POTASSIUM CHLORIDE 40 MEQ: 750 CAPSULE, EXTENDED RELEASE ORAL at 13:44

## 2017-10-09 RX ADMIN — FAMOTIDINE 20 MG: 20 TABLET ORAL at 16:54

## 2017-10-09 RX ADMIN — CARVEDILOL 12.5 MG: 12.5 TABLET, FILM COATED ORAL at 20:41

## 2017-10-09 RX ADMIN — OXYCODONE HYDROCHLORIDE 10 MG: 5 TABLET ORAL at 06:15

## 2017-10-09 RX ADMIN — ATORVASTATIN CALCIUM 40 MG: 40 TABLET, FILM COATED ORAL at 20:41

## 2017-10-09 RX ADMIN — KETOROLAC TROMETHAMINE 15 MG: 15 INJECTION, SOLUTION INTRAMUSCULAR; INTRAVENOUS at 12:12

## 2017-10-09 RX ADMIN — TAZOBACTAM SODIUM AND PIPERACILLIN SODIUM 4.5 G: .5; 4 INJECTION, POWDER, LYOPHILIZED, FOR SOLUTION INTRAVENOUS at 00:03

## 2017-10-09 RX ADMIN — TAZOBACTAM SODIUM AND PIPERACILLIN SODIUM 4.5 G: .5; 4 INJECTION, POWDER, LYOPHILIZED, FOR SOLUTION INTRAVENOUS at 08:00

## 2017-10-09 RX ADMIN — POTASSIUM CHLORIDE 40 MEQ: 750 CAPSULE, EXTENDED RELEASE ORAL at 16:54

## 2017-10-09 RX ADMIN — SODIUM CHLORIDE 100 ML/HR: 9 INJECTION, SOLUTION INTRAVENOUS at 00:03

## 2017-10-09 RX ADMIN — DOCUSATE SODIUM 100 MG: 100 CAPSULE, LIQUID FILLED ORAL at 09:01

## 2017-10-09 RX ADMIN — NIFEDIPINE 120 MG: 10 CAPSULE ORAL at 09:02

## 2017-10-09 RX ADMIN — CITALOPRAM HYDROBROMIDE 20 MG: 20 TABLET ORAL at 09:03

## 2017-10-09 RX ADMIN — SODIUM CHLORIDE 100 ML/HR: 9 INJECTION, SOLUTION INTRAVENOUS at 12:15

## 2017-10-09 RX ADMIN — CARVEDILOL 12.5 MG: 12.5 TABLET, FILM COATED ORAL at 08:59

## 2017-10-09 RX ADMIN — OXYCODONE HYDROCHLORIDE 10 MG: 5 TABLET ORAL at 22:38

## 2017-10-09 NOTE — THERAPY EVALUATION
Acute Care - Physical Therapy Initial Evaluation   [place]     Patient Name: Richard Guevara  : 1960  MRN: [id number]  Today's Date: 10/9/2017   Onset of Illness/Injury or Date of Surgery Date: 10/08/17  Date of Referral to PT: 10/08/17  Referring Physician: Angel      Admit Date: 10/7/2017     Visit Dx:    ICD-10-CM ICD-9-CM   1. Rheumatoid arthritis flare M06.9 714.0   2. Tachycardia R00.0 785.0   3. Pyogenic arthritis of multiple sites, due to unspecified organism M00.9 711.09   4. Impaired mobility and ADLs Z74.09 799.89   5. Impaired functional mobility, balance, gait, and endurance Z74.09 V49.89     Patient Active Problem List   Diagnosis   • Rheumatoid arthritis   • Gout   • Hypertension   • CAD (coronary artery disease)   • HLD (hyperlipidemia)   • Acute joint pain   • Joint erythema   • Altered mental status   • Immobility   • Weakness   • Rheumatoid arthritis flare   • Tachycardia   • Pyogenic arthritis of multiple sites     Past Medical History:   Diagnosis Date   • Altered mental status 10/8/2017   • CAD (coronary artery disease) 10/8/2017   • Erythematous condition 10/8/2017   • Gout 10/8/2017   • HLD (hyperlipidemia) 10/8/2017   • Hyperlipidemia    • Hypertension    • Immobility 10/8/2017   • Joint erythema 10/8/2017   • Joint pain 10/8/2017   • RA (rheumatoid arthritis)    • Rheumatoid arthritis 10/8/2017   • Tachycardia 10/8/2017   • Weakness 10/8/2017     Past Surgical History:   Procedure Laterality Date   • CORONARY ANGIOPLASTY WITH STENT PLACEMENT      x1, around  edith   • SKIN BIOPSY      hx a few suspicious spots removed, only required removal, unsure of result          PT ASSESSMENT (last 72 hours)      PT Evaluation       10/09/17 0850 10/09/17 0835    Rehab Evaluation    Document Type evaluation  -EH evaluation;therapy note (daily note)  -ST    Subjective Information agree to therapy;complains of;pain;weakness  -EH no complaints;agree to therapy  -ST    Patient Effort, Rehab  Treatment  good  -ST    Symptoms Noted During/After Treatment  fatigue;increased pain  -ST    Symptoms Noted Comment  RN notified   -    General Information    Patient Profile Review yes  - yes  -ST    Onset of Illness/Injury or Date of Surgery Date 10/08/17  - 10/07/17  -    Referring Physician Angel  - MD Angel  -    General Observations Pt supine in bed with HOB elevated, son in room.  - pt supine upon arrival; IV and 02 NC intact; son present; B ACE bandages to wrists  -    Pertinent History Of Current Problem Pt presents to hospital after recent bout of flu with hx of RA and c/o Sylvester wrist pain, and L knee pain. Found to have septic arthritis now s/p I &D of Sylvester wrists and L knee. ROM as tolerated, WBAT each joint.  - Pt presents with L knee pain and B wrist pain d/t septic arthritis. S/P exploration and drainage of L knee, arthrotomy of radiocarpal/midcarpal w/drainage and exploration B wrists; WBAT BUE's, LLE with ROM as tolerated all joints.   -    Precautions/Limitations fall precautions;other (see comments)   WBAT BUE, LLE, ROM as tolerated.  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  - independent:;all household mobility;community mobility;transfer;feeding;grooming;dressing;bathing;home management   prior to RA flair, pt independent, uses SPC occassionally   -    Equipment Currently Used at Home wheelchair;walker, standard   was not using prior  - wheelchair;cane, straight;walker, rolling  -    Plans/Goals Discussed With patient;agreed upon  - patient and family;agreed upon  -    Risks Reviewed patient:;LOB;increased discomfort  - patient and family:;LOB;nausea/vomiting;dizziness;increased discomfort;change in vital signs  -    Benefits Reviewed patient:;improve  function;increase independence  - patient and family:;improve function;increase independence;increase strength;increase balance;decrease pain;increase knowledge  -    Barriers to Rehab previous functional deficit;medically complex  - previous functional deficit;medically complex  -    Living Environment    Lives With other (see comments)   mother  - other (see comments)   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   -    Living Arrangements house  - house  -    Home Accessibility stairs to enter home;tub/shower is not walk in   walk in and tub shower; one level  - stairs to enter home;tub/shower is not walk in   also with walk-in shower   -    Number of Stairs to Enter Home 2  - 2  -ST    Stair Railings at Home none  - none  -    Living Environment Comment Pt retired; has falls; son visits often  - son comes by frequently, pt retired; has falls   -    Clinical Impression    Date of Referral to PT 10/08/17  [facility]     PT Diagnosis decreased functional mobility, decreased strength, decreased ROM,  -     Criteria for Skilled Therapeutic Interventions Met yes;treatment indicated  -     Rehab Potential fair, will monitor progress closely  -     Pain Assessment    Pain Assessment 0-10  - 0-10  -ST    Pain Score 8  -EH 8  -ST    Post Pain Score 8  - 8  -ST    Pain Type Acute pain  - Acute pain  -    Pain Location Knee   and L>R wrist  - Wrist  -    Pain Intervention(s) Repositioned;Ambulation/increased activity  - Repositioned  -    Response to Interventions tolerated  -     Vision Assessment/Intervention    Visual Impairment WFL  - WNL  -ST    Cognitive Assessment/Intervention    Current Cognitive/Communication Assessment functional  - functional  -ST    Orientation Status oriented x 4  - oriented x 4  -ST    Follows Commands/Answers Questions 100% of the time;able to follow single-step instructions  - 100% of the time  -ST    Personal Safety mild impairment;decreased awareness,  need for assist;decreased awareness, need for safety  -EH mild impairment  -ST    Personal Safety Interventions fall prevention program maintained;gait belt;nonskid shoes/slippers when out of bed  -EH fall prevention program maintained;gait belt;nonskid shoes/slippers when out of bed  -ST    ROM (Range of Motion)    General ROM lower extremity range of motion deficits identified  - upper extremity range of motion deficits identified  -    General ROM Detail LLE knee flexion limited to ~45 degrees, extension limitied ~ 25 degrees.  -     MMT (Manual Muscle Testing)    General MMT Assessment  upper extremity strength deficits identified  -ST    General MMT Assessment Detail L Knee extension 1/5, knee flexion 3/5,DF 4/5. LLE knee extension 4/5, DF, PF 4/5.  -     Bed Mobility, Assessment/Treatment    Bed Mob, Supine to Sit, Willards minimum assist (75% patient effort);2 person assist required  -     Bed Mob, Sit to Supine, Willards not tested  -     Transfer Assessment/Treatment    Transfers, Sit-Stand Will[place] moderate assist (50% patient effort);2 person assist required   + 1 to assist with stablization of Walker/ IV pole.  -     Transfers, Stand-Sit Will[place] maximum assist (25% patient effort);2 person assist required   +3rd for equipment, pt son assists LLE to chair  -     Transfers, Sit-Stand-Sit, Assist Device rolling platform walker;other (see comments)   KI  -EH     Transfer, Comment Pt performs sit to stand with bed elevated using platform walker to stand. Once standing pt instructed to WB through LLE, but RLE knee frantz with continued knee bend on LLE. Pt needs assist of PT/OT to maintain standing. Bed brought to pt to sit. 2nd attempt made with KI on RLE, platform walker and elevated bed surface. Pt able to stand with MAX Ax2, take steps toward chair with cues for upright posture, sequencing and MAX Ax2.  -     Gait Assessment/Treatment    Gait, Willards Level maximum  assist (25% patient effort);upper extremity support;other (see comments)   3rd person for equipment.  -     Gait, Assistive Device rolling platform walker;other (see comments)   KI  -     Gait, Distance (Feet) 5  -     Gait, Gait Pattern Analysis swing-to gait  -     Gait, Maintain Weight Bearing Status cues to maintain weight bearing status   pt using touch down weight bearing only,  -     Gait, Comment BSC moved behind pt as able, support given at gait belt, hips to maintain upright during gait.  -     Therapy Exercises    Bilateral Lower Extremities AROM:;10 reps;ankle pumps/circles;quad sets;glut sets;heel slides  -     Sensory Assessment/Intervention    Light Touch LLE;RLE  -     LLE Light Touch WNL  -     RLE Light Touch mild impairment  -     Positioning and Restraints    Pre-Treatment Position in bed  -     Post Treatment Position chair  -     In Chair notified nsg;reclined;call light within reach;encouraged to call for assist;exit alarm on;with OT;waffle cushion  -       10/09/17 0800 10/09/17 0157    General Information    Equipment Currently Used at Home  wheelchair;walker, standard  -AD    Living Environment    Transportation Available  car;family or friend will provide  -AD    Muscle Tone Assessment    Muscle Tone Assessment Bilateral Upper Extremities  -MARIANA     Bilateral Upper Extremities Muscle Tone Assessment rigidity  -MARIANA       10/08/17 0723 10/08/17 0238    General Information    Equipment Currently Used at Home  wheelchair;walker, standard  -AD    Living Environment    Lives With  parent(s)  -AD    Living Arrangements  house  -AD    Home Accessibility  no concerns  -AD    Stair Railings at Home  none  -AD    Type of Financial/Environmental Concern  none  -AD    Transportation Available car;family or friend will provide  -AD car;family or friend will provide  -AD      User Key  (r) = Recorded By, (t) = Taken By, (c) = Cosigned By    Initials Name Provider Type      Dori Mancini, PT Physical Therapist    ST Whitney Nails, OTR Occupational Therapist    MARIANA Schumacher LPN Licensed Nurse    DEN Ramos RN Registered Nurse          Physical Therapy Education     Title: PT OT SLP Therapies (Active)     Topic: Physical Therapy (Active)     Point: Mobility training (Active)    Learning Progress Summary    Learner Readiness Method Response Comment Documented by Status   Patient Acceptance E [facility] 10/09/17 1323 Active   Family Acceptance E [facility] 10/09/17 1323 Active               Point: Home exercise program (Active)    Learning Progress Summary    Learner Readiness Method Response Comment Documented by Status   Patient Acceptance E [facility] 10/09/17 1323 Active   Family Acceptance E [facility] 10/09/17 1323 Active               Point: Body mechanics (Active)    Learning Progress Summary    Learner Readiness Method Response Comment Documented by Status   Patient Acceptance E [facility] 10/09/17 1323 Active   Family Acceptance E [facility] 10/09/17 1323 Active               Point: Precautions (Active)    Learning Progress Summary    Learner Readiness Method Response Comment Documented by Status   Patient Acceptance E [facility] 10/09/17 1323 Active   Family Acceptance E [facility] 10/09/17 1323 Active                      User Key     Initials Effective Dates Name Provider Type Discipline     06/19/15 -  Dori Mancini, PT Physical Therapist PT                PT Recommendation and Plan  Anticipated Equipment Needs At Discharge: bedside commode  Anticipated Discharge Disposition: inpatient rehabilitation facility  PT Frequency: daily  Plan of Care Review  Plan Of Care Reviewed With: patient  Outcome Summary/Follow up Plan: Pt ambulates to chair with MAX Ax2 +1 for equipment using rolling platform walker 2/2 wrist pain/ bandaging and knee immobilizer on RLE due to RLE buckling when attempting to WB through LLE. Pt limited 2/2 pain and weakness. Pt will need to inpatient rehab  upon d/c.          IP PT Goals       10/09/17 1323          Bed Mobility PT LTG    Bed Mobility PT LTG, Date Established 10/09/17  -      Bed Mobility PT LTG, Time to Achieve 2 wks  -EH      Bed Mobility PT LTG, Activity Type supine to sit/sit to supine  -      Bed Mobility PT LTG, Andrew Level conditional independence  -      Transfer Training PT LTG    Transfer Training PT LTG, Date Established 10/09/17  -      Transfer Training PT LTG, Time to Achieve 2 wks  -EH      Transfer Training PT LTG, Activity Type sit to stand/stand to sit  -      Transfer Training PT LTG, Andrew Level minimum assist (75% patient effort)  -      Transfer Training PT LTG, Assist Device walker, platform  -      Gait Training PT LTG    Gait Training Goal PT LTG, Date Established 10/09/17  -      Gait Training Goal PT LTG, Time to Achieve 2 wks  -EH      Gait Training Goal PT LTG, Andrew Level moderate assist (50% patient effort)  -      Gait Training Goal PT LTG, Assist Device walker, rolling platform  -      Stair Training PT LTG    Stair Training Goal PT LTG, Date Established 10/09/17  -      Stair Training Goal PT LTG, Time to Achieve 2 wks  -      Stair Training Goal PT LTG, Number of Steps 2  -      Stair Training Goal PT LTG, Andrew Level moderate assist (50% patient effort);2 person assist required  -      Stair Training Goal PT LTG, Assist Device 2 handrails;walker, platform  -EH        User Key  (r) = Recorded By, (t) = Taken By, (c) = Cosigned By    Initials Name Provider Type     Dori Mancini, PT Physical Therapist                Outcome Measures       10/09/17 0850          How much help from another person do you currently need...    Turning from your back to your side while in flat bed without using bedrails? 2  -EH      Moving from lying on back to sitting on the side of a flat bed without bedrails? 2  -EH      Moving to and from a bed to a chair (including a  wheelchair)? 2  -EH      Standing up from a chair using your arms (e.g., wheelchair, bedside chair)? 2  -EH      Climbing 3-5 steps with a railing? 1  -EH      To walk in hospital room? 1  -EH      AM-PAC 6 Clicks Score 10  -EH      Functional Assessment    Outcome Measure Options AM-PAC 6 Clicks Basic Mobility (PT)  -        User Key  (r) = Recorded By, (t) = Taken By, (c) = Cosigned By    Initials Name Provider Type     Dori Mancini PT Physical Therapist           Time Calculation:         PT Charges       10/09/17 1328          Time Calculation    Start Time 0850  -      PT Received On 10/09/17  -      PT Goal Re-Cert Due Date 10/19/17  -      Time Calculation- PT    Total Timed Code Minutes- PT 10 minute(s)  -        User Key  (r) = Recorded By, (t) = Taken By, (c) = Cosigned By    Initials Name Provider Type     Dori Mancini PT Physical Therapist          Therapy Charges for Today     Code Description Service Date Service Provider Modifiers Qty    55587255530 HC PT THER PROC EA 15 MIN 10/9/2017 Dori Mancini, PT GP 1    79195167195 HC PT EVAL MOD COMPLEXITY 4 10/9/2017 Dori Mancini, PT GP 1    69246703402 HC PT THER SUPP EA 15 MIN 10/9/2017 Dori Mancini, PT GP 2          PT G-Codes  Outcome Measure Options: AM-PAC 6 Clicks Basic Mobility (PT)      Dori Mancini, PT  10/9/2017

## 2017-10-09 NOTE — CONSULTS
INFECTIOUS DISEASE CONSULT/INITIAL HOSPITAL VISIT    Richard Guevara  1960  [id number]    Date of Consult: 10/9/2017    Admission Date: 10/7/2017      Requesting Provider: No ref. provider found  Evaluating Physician: Quentin Nunes MD    Reason for Consultation: septic arthritis    History of present illness:    Patient is a 57 y.o. male with rheumatoid arthritis recently increased on dose of prednisone has experienced worsening pain in bilateral wrists and left knee over the last  4 weeks.  Patient developed redness on forearm and contacted pcp and was ultimately referred to [facility] ED.  Aspiration of wrists, and knees has revealed septic arthritis picture with high wbc, with neutrophlic predominance so patient taken to OR for wrist arthrotomies, left knee arthrotomy.        Past Medical History:   Diagnosis Date   • Altered mental status 10/8/2017   • CAD (coronary artery disease) 10/8/2017   • Erythematous condition 10/8/2017   • Gout 10/8/2017   • HLD (hyperlipidemia) 10/8/2017   • Hyperlipidemia    • Hypertension    • Immobility 10/8/2017   • Joint erythema 10/8/2017   • Joint pain 10/8/2017   • RA (rheumatoid arthritis)    • Rheumatoid arthritis 10/8/2017   • Tachycardia 10/8/2017   • Weakness 10/8/2017       Past Surgical History:   Procedure Laterality Date   • CORONARY ANGIOPLASTY WITH STENT PLACEMENT      x1, around 2007 edith   • KNEE ARTHROTOMY Left 10/8/2017    Procedure: KNEE ARTHROTOMY;  Surgeon: Addy Ortiz MD;  Location:  [facility];  Service:    • SKIN BIOPSY      hx a few suspicious spots removed, only required removal, unsure of result   • WRIST ARTHROTOMY Bilateral 10/8/2017    Procedure: WRIST ARTHROTOMY;  Surgeon: Addy Ortiz MD;  Location:  [facility];  Service:        Family History   Problem Relation Age of Onset   • Osteoporosis Mother    • COPD Father    • Rheum arthritis Father    • Arthritis Sister    • Osteoporosis Sister        Social History     Social History   • Marital  status:      Spouse name: N/A   • Number of children: N/A   • Years of education: N/A     Occupational History   • Not on file.     Social History Main Topics   • Smoking status: Current Every Day Smoker     Packs/day: 1.00   • Smokeless tobacco: Never Used   • Alcohol use No   • Drug use: No   • Sexual activity: Defer     Other Topics Concern   • Not on file     Social History Narrative    Mr. Guevara is a 57 year old white  male. He has 2 children. He has been disabled from a younger age r/t advanced rheumatoid arthritis. He does not have an advanced directive.       No Known Allergies      Medication:    Current Facility-Administered Medications:   •  [START ON 10/11/2017] ! Vancomycin trough 10/11/17 at 1330, , Does not apply, Once, Fartun [facility]  •  acetaminophen (TYLENOL) tablet 500 mg, 500 mg, Oral, TID PRN, Josey Fritz APRN  •  acetaminophen (TYLENOL) tablet 650 mg, 650 mg, Oral, Q4H PRN, Addy Ortiz MD  •  amitriptyline (ELAVIL) tablet 75 mg, 75 mg, Oral, Nightly, Josey Fritz, APRN, 75 mg at 10/08/17 2044  •  aspirin chewable tablet 81 mg, 81 mg, Oral, Daily, Josey Fritz APRN, 81 mg at 10/09/17 0855  •  atorvastatin (LIPITOR) tablet 40 mg, 40 mg, Oral, Nightly, Josey Fritz, APRN, 40 mg at 10/08/17 2044  •  bisacodyl (DULCOLAX) EC tablet 10 mg, 10 mg, Oral, Daily PRN, Addy Ortiz MD  •  bisacodyl (DULCOLAX) suppository 10 mg, 10 mg, Rectal, Daily PRN, Addy Ortiz MD  •  carvedilol (COREG) tablet 12.5 mg, 12.5 mg, Oral, Q12H, Josey Fritz APRN, 12.5 mg at 10/09/17 0859  •  cetirizine (zyrTEC) tablet 10 mg, 10 mg, Oral, Nightly, Josey Fritz APRN, 10 mg at 10/08/17 2044  •  citalopram (CeleXA) tablet 20 mg, 20 mg, Oral, Daily, ZAC Foster, 20 mg at 10/09/17 0903  •  clopidogrel (PLAVIX) tablet 75 mg, 75 mg, Oral, Daily, ZAC Foster, 75 mg at 10/09/17 0901  •  colchicine tablet 0.6 mg, 0.6  mg, Oral, Daily, Josey Fritz, APRN, 0.6 mg at 10/09/17 0903  •  diclofenac (VOLTAREN) 1 % gel 4 g, 4 g, Topical, 4x Daily PRN, Josey Fritz APRN  •  diltiaZEM CD (CARDIZEM CD) 24 hr capsule 120 mg, 120 mg, Oral, Daily, Josey Fritz, APRN, 120 mg at 10/09/17 0902  •  docusate sodium (COLACE) capsule 100 mg, 100 mg, Oral, Daily, Josey Fritz, APRN, 100 mg at 10/09/17 0901  •  docusate sodium (COLACE) capsule 100 mg, 100 mg, Oral, BID PRN, Addy Ortiz MD  •  famotidine (PEPCID) tablet 20 mg, 20 mg, Oral, BID, Josey Fritz, APRN, 20 mg at 10/09/17 1654  •  HYDROmorphone (DILAUDID) injection 0.5 mg, 0.5 mg, Intravenous, Q2H PRN **AND** naloxone (NARCAN) injection 0.1 mg, 0.1 mg, Intravenous, Q5 Min PRN, Addy Ortiz MD  •  influenza vac split quad (FLUZONE,FLUARIX,AFLURIA) injection 0.5 mL, 0.5 mL, Intramuscular, During Hospitalization, Eleno Rea MD  •  ketorolac (TORADOL) injection 15 mg, 15 mg, Intravenous, Q6H, Josey Fritz APRN, 15 mg at 10/09/17 1654  •  Magnesium Sulfate 2 gram Bolus, followed by 8 gram infusion (total Mg dose 10 grams)- Mg less than or equal to 1mg/dL, 2 g, Intravenous, PRN **OR** Magnesium Sulfate 6 gram Infusion (2 gm x 3) -Mg 1.1 -1.5 mg/dL, 2 g, Intravenous, PRN **OR** magnesium sulfate 4 gram infusion- Mg 1.6-1.9 mg/dL, 4 g, Intravenous, PRN, Josey Frizt, APRN, Last Rate: 25 mL/hr at 10/08/17 1431, 4 g at 10/08/17 1431  •  melatonin sublingual tablet 5 mg, 5 mg, Sublingual, Nightly PRN, ZAC Foster  •  ondansetron (ZOFRAN) injection 4 mg, 4 mg, Intravenous, Q6H PRN, ZAC Foster  •  ondansetron (ZOFRAN) tablet 4 mg, 4 mg, Oral, Q6H PRN **OR** ondansetron (ZOFRAN) injection 4 mg, 4 mg, Intravenous, Q6H PRN, Addy Ortiz MD  •  oxyCODONE (ROXICODONE) immediate release tablet 10 mg, 10 mg, Oral, Q4H PRN, ZAC Foster, 10 mg at 10/09/17 1055  •  oxyCODONE-acetaminophen (PERCOCET)  5-325 MG per tablet 1 tablet, 1 tablet, Oral, Q4H PRN, Addy Ortiz MD  •  oxyCODONE-acetaminophen (PERCOCET) 5-325 MG per tablet 2 tablet, 2 tablet, Oral, Q4H PRN, Addy Ortiz MD  •  pantoprazole (PROTONIX) EC tablet 40 mg, 40 mg, Oral, Q AM, ZAC Foster, 40 mg at 10/09/17 0552  •  Pharmacy to dose vancomycin, , Does not apply, Continuous PRN, ZAC Foster  •  piperacillin-tazobactam (ZOSYN) 4.5 g/100 mL 0.9% NS IVPB (mbp), 4.5 g, Intravenous, Q8H, ZAC Foster, 4.5 g at 10/09/17 0800  •  potassium chloride (MICRO-K) CR capsule 40 mEq, 40 mEq, Oral, PRN, 40 mEq at 10/09/17 1654 **OR** potassium chloride (KLOR-CON) packet 40 mEq, 40 mEq, Oral, PRN **OR** potassium chloride 10 mEq in 100 mL IVPB, 10 mEq, Intravenous, Q1H PRN, ZAC Foster  •  sodium chloride 0.9 % flush 1-10 mL, 1-10 mL, Intravenous, PRN, ZAC Foster  •  sodium chloride 0.9 % flush 1-10 mL, 1-10 mL, Intravenous, PRN, Addy Ortiz MD  •  Insert peripheral IV, , , Once **AND** sodium chloride 0.9 % flush 10 mL, 10 mL, Intravenous, PRN, ZAC Beyer  •  sodium chloride 0.9 % infusion, 100 mL/hr, Intravenous, Continuous, ZAC Foster, Stopped at 10/08/17 1035  •  sodium chloride 0.9 % infusion, 100 mL/hr, Intravenous, Continuous, Addy Ortiz MD  •  sodium chloride 0.9 % infusion, 100 mL/hr, Intravenous, Continuous, Addy Ortiz MD, Last Rate: 100 mL/hr at 10/09/17 1215, 100 mL/hr at 10/09/17 1215  •  traMADol (ULTRAM) tablet 50 mg, 50 mg, Oral, 4x Daily PRN, ZAC Foster  •  vancomycin IVPB 1500 mg in 0.9% NaCl (Premix) 500 mL, 15 mg/kg, Intravenous, Q12H, Saurabh Ramirez [facility], 1,500 mg at 10/09/17 1527    Antibiotics:  IV Anti-Infectives     Ordered     Dose/Rate Route Frequency Start Stop    10/08/17 1408  ceFAZolin in dextrose (ANCEF) IVPB solution 2 g     Ordering Provider:  Addy Ortiz MD    2 g Intravenous  Once 10/09/17 0600 10/09/17 0616    10/08/17 0438  vancomycin IVPB 1500 mg in 0.9% NaCl (Premix) 500 mL     Ordering Provider:  Saurabh Ramirez RPH    15 mg/kg × 101 kg  over 90 Minutes Intravenous Every 12 Hours 10/08/17 1400      10/08/17 0433  piperacillin-tazobactam (ZOSYN) 4.5 g/100 mL 0.9% NS IVPB (mbp)     Ordering Provider:  ZAC Foster    4.5 g  over 4 Hours Intravenous Every 8 Hours 10/08/17 0800      10/08/17 0433  Pharmacy to dose vancomycin     Ordering Provider:  Josey Fritz APRN     Does not apply Continuous PRN 10/08/17 0419      10/07/17 2346  vancomycin IVPB 2000 mg in 0.9% Sodium Chloride (premix) 500 mL     Ordering Provider:  Adelaide Anne APRN    20 mg/kg × 95.3 kg Intravenous Once 10/07/17 2348 10/08/17 0311    10/07/17 2346  piperacillin-tazobactam (ZOSYN) 4.5 g/100 mL 0.9% NS IVPB (mbp)     Ordering Provider:  Adelaide Anne APRN    4.5 g Intravenous Once 10/07/17 2348 10/08/17 0150            Review of Systems:  Pertinent positives include activity change, appetitie change, fatigue, chills,   Palpitations  Vomiting  Arthralgias, joint swelling,  No fever    Rest of ROS reviewed and were negative    Physical Exam:   Vital Signs  Temp (24hrs), Av.8 °F (37.1 °C), Min:98.5 °F (36.9 °C), Max:99.5 °F (37.5 °C)    Temp  Min: 98.5 °F (36.9 °C)  Max: 99.5 °F (37.5 °C)  BP  Min: 110/70  Max: 136/70  Pulse  Min: 97  Max: 110  Resp  Min: 18  Max: 20  SpO2  Min: 94 %  Max: 94 %    GENERAL: Awake and alert, in no acute distress.   HEENT: Normocephalic, atraumatic.  PERRL. EOMI. No conjunctival injection. No icterus. Oropharynx clear without evidence of thrush or exudate. No evidence of peridontal disease.      HEART: RRR; No murmur, rubs, gallops.   LUNGS: Clear to auscultation bilaterally without wheezing, rales, rhonchi. Normal respiratory effort. Nonlabored. No dullness.  ABDOMEN: Soft, nontender, nondistended. Positive bowel sounds. No rebound or guarding. NO mass  or HSM.  EXT:  No cyanosis, clubbing or edema. No cord.    MSK: left knee wrapped, bilateral wrists wrapped  SKIN: Warm and dry without cutaneous eruptions on Inspection/palpation.    NEURO: Oriented to PPT. No focal deficits on motor/sensory exam at arms/legs.  PSYCHIATRIC: Normal insight and judgement. Cooperative with PE    Laboratory Data      Results from last 7 days  Lab Units 10/09/17  0550 10/08/17  1513 10/08/17  0515 10/07/17  2103   WBC 10*3/mm3  --   --  4.99 4.94   HEMOGLOBIN g/dL 12.0* 12.3* 12.8* 14.9   HEMATOCRIT % 36.7* 36.7* 38.0* 43.3   PLATELETS 10*3/mm3  --   --  308 347       Results from last 7 days  Lab Units 10/09/17  0550   SODIUM mmol/L 141   POTASSIUM mmol/L 3.3*   CHLORIDE mmol/L 107   CO2 mmol/L 22.0   BUN mg/dL 11   CREATININE mg/dL 0.70   GLUCOSE mg/dL 79   CALCIUM mg/dL 8.2*       Results from last 7 days  Lab Units 10/08/17  0515   ALK PHOS U/L 93   BILIRUBIN mg/dL 0.4   ALT (SGPT) U/L 92*   AST (SGOT) U/L 40*       Results from last 7 days  Lab Units 10/07/17  2103   SED RATE mm/hr 95*       Results from last 7 days  Lab Units 10/07/17  2133   CRP mg/dL 9.22*       Results from last 7 days  Lab Units 10/08/17  0515   LACTATE mmol/L 0.9           Results from last 7 days  Lab Units 10/09/17  1300   VANCOMYCIN TR mcg/mL 11.90     Estimated Creatinine Clearance: 145 mL/min (by C-G formula based on Cr of 0.7).      Microbiology:  Blood Culture   Date Value Ref Range Status   10/07/2017 No growth at 24 hours  Preliminary   10/07/2017 No growth at 24 hours  Preliminary                                Radiology:  Imaging Results (last 72 hours)     Procedure Component Value Units Date/Time    XR Chest 1 View [[id number]]  (Abnormal) Collected:  10/07/17 2111     Updated:  10/07/17 2239    Narrative:       EXAM:    XR Chest, 1 View    CLINICAL HISTORY:    57 years old, male; Pain; Chest pain; Type not specified; Additional info:   Redness and swelling of left forearm, Tachycardia, rheumatoid  arthritis    TECHNIQUE:    Frontal view of the chest.    COMPARISON:    [facility] 6/4/2014 12:51:11 PM    FINDINGS:    Lungs:  Unremarkable.  No consolidation.    Pleural space:  Unremarkable.  No pneumothorax.    Heart:  Unremarkable.  No cardiomegaly.    Mediastinum:  Unremarkable.    Bones/joints:  Old healed right posterior rib fractures.      Impression:         No acute abnormality.    THIS DOCUMENT HAS BEEN ELECTRONICALLY SIGNED BY ARMAND AMEZQUITA MD    XR Knee 1 or 2 View Left [[id number]]  (Abnormal) Collected:  10/08/17 0005     Updated:  10/08/17 0041    Narrative:       EXAM:    XR Left Knee, 1 or 2 views    CLINICAL HISTORY:    57 years old, male; Pain; Knee; Left; Additional info: Ra with swelling and   pain    TECHNIQUE:    Frontal and/or lateral views of the left knee.    COMPARISON:    No relevant prior studies available.    FINDINGS:    Bones/joints:  Severe tricompartmental joint space narrowing, worst   laterally.  No acute fracture.  No dislocation.    Soft tissues:  Soft tissue swelling at the knee.  Small suprapatellar   effusion.      Impression:       1.  Severe tricompartmental joint space narrowing, worst laterally.  2.  Soft tissue swelling at the knee.  Small suprapatellar effusion.    THIS DOCUMENT HAS BEEN ELECTRONICALLY SIGNED BY ARMAND AMEZQUITA MD    XR Wrist 3+ View Left [[id number]]  (Abnormal) Collected:  10/08/17 0005     Updated:  10/08/17 0048    Narrative:       EXAM:    XR Left Wrist Complete, 3 or More Views    CLINICAL HISTORY:    57 years old, male; Pain; Wrist; Left; Additional info: Ra with swelling and   pain    TECHNIQUE:    Frontal, lateral and oblique views of the left wrist.    COMPARISON:    No relevant prior studies available.    FINDINGS:    Bones/joints: No fracture. Severe degenerative changes consistent with   rheumatoid arthritis (worse on the left wrist) with mild palmar subluxation of   the carpal bones in relation to the radius and ulna.  Carpal  joint space   narrowing.  Severe radiocarpal joint space narrowing.  Second and third   metacarpophalangeal periarticular lucencies.  No acute fracture.    Soft tissues: Soft tissue swelling of the wrist.  No radiopaque foreign body.      Impression:         Severe degenerative changes consistent with rheumatoid arthritis (worse on   the left wrist) with mild palmar subluxation of the carpal bones in relation to   the radius and ulna.    THIS DOCUMENT HAS BEEN ELECTRONICALLY SIGNED BY NASIMA CLAROS MD    XR Wrist 3+ View Right [[id number]]  (Abnormal) Collected:  10/08/17 0005     Updated:  10/08/17 0049    Addenda:        IMPRESSION:       1.  No acute fracture.  2.  Degenerative changes of the right wrist consistent with rheumatoid   arthritis.    THIS DOCUMENT HAS BEEN ELECTRONICALLY SIGNED BY NASIMA CLAROS MD  Signed:  10/08/17 0049 by Nasima Claros MD    Narrative:       EXAM:    XR Right Wrist Complete, 3 or More Views    CLINICAL HISTORY:    57 years old, male; Pain; Wrist; Right; Additional info: Ra with swelling and   pain    TECHNIQUE:    Frontal, lateral and oblique views of the right wrist.    COMPARISON:    Left wrist radiograph same date    FINDINGS:    Bones/joints:  No acute fracture.  Radiocarpal joint space narrowing.    Ulnocarpal joint space narrowing.  Diffuse carpal joint space narrowing.    Carpal periarticular lucencies consistent with rheumatoid arthritis.  Fifth   carpometacarpal periarticular lucencies.  First and fifth metacarpophalangeal   periarticular lucencies.  No dislocation.    Soft tissues:  Unremarkable.  No radiopaque foreign body.      Impression:       1.  No acute fracture.  2.  Degenerative changes of the right wrist consistent with rheumatoid   arthritis.    THIS DOCUMENT HAS BEEN ELECTRONICALLY SIGNED BY NASIMA CLAROS MD    XR Chest 1 View [[id number]] Collected:  10/08/17 1547     Updated:  10/09/17 1053    Narrative:       EXAMINATION: XR CHEST 1 VW - 10/08/2017      INDICATION: M06.9-Rheumatoid arthritis, unspecified; R00.0-Tachycardia,  unspecified; M00.9-Pyogenic arthritis, unspecified.     COMPARISON: 10/07/2017.     FINDINGS: Portable chest reveals deep line catheter on the right with  tip in the SVC. Heart is borderline enlarged. Underlying chronic change  is seen within the lung fields. Healed fracture is seen of the right  posterior ribs. No pleural effusion or pneumothorax. Pulmonary  vascularity is within normal limits.        Impression:       Deep line catheter identified on the right with tip in the  SVC. No evidence of pneumothorax.     DICTATED:     10/08/2017  EDITED:         10/08/2017        This report was finalized on 10/9/2017 10:51 AM by Dr. Yue Fierro MD.               Impression:   Septic arthritis left knee, left, right wrist  S. Aureus disseminated infection  Rheumatoid arthritis  Immunocompromised host on chronic prednisone    PLAN/RECOMMENDATIONS:   Thank you for asking us to see Richard Guevara, I recommend the following:  Cont vancomycin  F/u op cultures, bl cx  Change zosyn to ctx 2 g iv daily    Needs 6 weeks of iv abx  Serial esr if not downtrending may require repeat washout of joints    D/w family    Quentin Nunes MD  10/9/2017  5:30 PM

## 2017-10-09 NOTE — PLAN OF CARE
Problem: Patient Care Overview (Adult)  Goal: Plan of Care Review  Outcome: Ongoing (interventions implemented as appropriate)    10/09/17 0835   Coping/Psychosocial Response Interventions   Plan Of Care Reviewed With patient   Outcome Evaluation   Outcome Summary/Follow up Plan Pt s/p drainage of L knee and bilateral wrists and now w/limited mobility and ROM impacting ADLs, transfers and mobility. Pt max X2 for transfers/mobility with platform rwx and required use of KI for RLE d/t buckling w/standing. Pt with significantly complicated issues impacting rehab progress this date & would benefit greatly from IP or SNF level rehab at d/c. Pt notes mult. falls at home.          Problem: Inpatient Occupational Therapy  Goal: Bed Mobility Goal LTG- OT  Outcome: Ongoing (interventions implemented as appropriate)    10/09/17 0835   Bed Mobility OT LTG   Bed Mobility OT LTG, Time to Achieve 2 wks   Bed Mobility OT LTG, Activity Type supine to sit/sit to supine   Bed Mobility OT LTG, [place] Level contact guard assist   Bed Mobility OT LTG, Assist Device bed rails   Bed Mobility OT LTG, Outcome goal ongoing       Goal: Transfer Training Goal 1 LTG- OT  Outcome: Ongoing (interventions implemented as appropriate)    10/09/17 0835   Transfer Training OT LTG   Transfer Training OT LTG, Time to Achieve 2 wks   Transfer Training OT LTG, Activity Type toilet;sit to stand/stand to sit;bed to chair /chair to bed   Transfer Training OT LTG, [place] Level minimum assist (75% patient effort);2 person assist required   Transfer Training OT LTG, Assist Device walker, rolling platform   Transfer Training OT LTG, Outcome goal ongoing       Goal: Strength Goal LTG- OT  Outcome: Ongoing (interventions implemented as appropriate)    10/09/17 0835   Strength OT LTG   Strength Goal OT LTG, Time to Achieve 2 wks   Strength Goal OT LTG, Functional Goal Pt will be min A with hand strengthening and coordination exercises by d/c to promote  increased independence with daily tasks.    Strength Goal OT LTG, Outcome goal ongoing       Goal: UB Dressing Goal LTG- OT  Outcome: Ongoing (interventions implemented as appropriate)    10/09/17 0835   UB Dressing OT LTG   UB Dressing Goal OT LTG, Time to Achieve 2 wks   UB Dressing Goal OT LTG, [place] Level minimum assist (75% patient effort)   UB Dressing Goal OT LTG, Outcome goal ongoing

## 2017-10-09 NOTE — CONSULTS
INFECTIOUS DISEASE CONSULT/INITIAL HOSPITAL VISIT    Richard Guevara  1960  [id number]    Date of Consult: 10/9/2017    Admission Date: 10/7/2017      Requesting Provider: No ref. provider found  Evaluating Physician: Quentin Nunes MD    Reason for Consultation: septic arthritis    History of present illness:    Patient is a 57 y.o. male with rheumatoid arthritis recently increased on dose of prednisone has experienced worsening pain in bilateral wrists and left knee over the last  4 weeks.  Patient developed redness on forearm and contacted pcp and was ultimately referred to [facility] ED.  Aspiration of wrists, and knees has revealed septic arthritis picture with high wbc, with neutrophlic predominance so patient taken to OR for wrist arthrotomies, left knee arthrotomy.    6 weeks ago had blister on hand that he popped.    Unaware if he has a boil, or clearly infected skin/soft tissue infection    Feels better      Past Medical History:   Diagnosis Date   • Altered mental status 10/8/2017   • CAD (coronary artery disease) 10/8/2017   • Erythematous condition 10/8/2017   • Gout 10/8/2017   • HLD (hyperlipidemia) 10/8/2017   • Hyperlipidemia    • Hypertension    • Immobility 10/8/2017   • Joint erythema 10/8/2017   • Joint pain 10/8/2017   • RA (rheumatoid arthritis)    • Rheumatoid arthritis 10/8/2017   • Tachycardia 10/8/2017   • Weakness 10/8/2017       Past Surgical History:   Procedure Laterality Date   • CORONARY ANGIOPLASTY WITH STENT PLACEMENT      x1, around 2007 edith   • KNEE ARTHROTOMY Left 10/8/2017    Procedure: KNEE ARTHROTOMY;  Surgeon: Addy Ortiz MD;  Location:  [facility];  Service:    • SKIN BIOPSY      hx a few suspicious spots removed, only required removal, unsure of result   • WRIST ARTHROTOMY Bilateral 10/8/2017    Procedure: WRIST ARTHROTOMY;  Surgeon: Addy Ortiz MD;  Location:  [facility];  Service:        Family History   Problem Relation Age of Onset   • Osteoporosis Mother     • COPD Father    • Rheum arthritis Father    • Arthritis Sister    • Osteoporosis Sister        Social History     Social History   • Marital status:      Spouse name: N/A   • Number of children: N/A   • Years of education: N/A     Occupational History   • Not on file.     Social History Main Topics   • Smoking status: Current Every Day Smoker     Packs/day: 1.00   • Smokeless tobacco: Never Used   • Alcohol use No   • Drug use: No   • Sexual activity: Defer     Other Topics Concern   • Not on file     Social History Narrative    Mr. Guevara is a 57 year old white  male. He has 2 children. He has been disabled from a younger age r/t advanced rheumatoid arthritis. He does not have an advanced directive.       No Known Allergies      Medication:    Current Facility-Administered Medications:   •  [START ON 10/11/2017] ! Vancomycin trough 10/11/17 at 1330, , Does not apply, Once, Fartun Dubois, [facility]  •  acetaminophen (TYLENOL) tablet 500 mg, 500 mg, Oral, TID PRN, Josey Fritz APRN  •  acetaminophen (TYLENOL) tablet 650 mg, 650 mg, Oral, Q4H PRN, Addy Ortiz MD  •  amitriptyline (ELAVIL) tablet 75 mg, 75 mg, Oral, Nightly, Josey Fritz APRN, 75 mg at 10/08/17 2044  •  aspirin chewable tablet 81 mg, 81 mg, Oral, Daily, Josey Fritz APRN, 81 mg at 10/09/17 0855  •  atorvastatin (LIPITOR) tablet 40 mg, 40 mg, Oral, Nightly, Josey Fritz APRN, 40 mg at 10/08/17 2044  •  bisacodyl (DULCOLAX) EC tablet 10 mg, 10 mg, Oral, Daily PRN, Addy Ortiz MD  •  bisacodyl (DULCOLAX) suppository 10 mg, 10 mg, Rectal, Daily PRN, Addy Ortiz MD  •  carvedilol (COREG) tablet 12.5 mg, 12.5 mg, Oral, Q12H, Josey Fritz APRN, 12.5 mg at 10/09/17 0859  •  cefTRIAXone (ROCEPHIN) IVPB 2 g, 2 g, Intravenous, Q24H, Quentin Nunes MD  •  cetirizine (zyrTEC) tablet 10 mg, 10 mg, Oral, Nightly, ZAC Foster, 10 mg at 10/08/17 2044  •  citalopram (CeleXA)  tablet 20 mg, 20 mg, Oral, Daily, Josey Fritz, APRN, 20 mg at 10/09/17 0903  •  clopidogrel (PLAVIX) tablet 75 mg, 75 mg, Oral, Daily, Josey Fritz, APRN, 75 mg at 10/09/17 0901  •  colchicine tablet 0.6 mg, 0.6 mg, Oral, Daily, Josey Fritz, APRN, 0.6 mg at 10/09/17 0903  •  diclofenac (VOLTAREN) 1 % gel 4 g, 4 g, Topical, 4x Daily PRN, Josey Fritz, APRN  •  diltiaZEM CD (CARDIZEM CD) 24 hr capsule 120 mg, 120 mg, Oral, Daily, Josey Fritz, APRN, 120 mg at 10/09/17 0902  •  docusate sodium (COLACE) capsule 100 mg, 100 mg, Oral, Daily, Josey Fritz, APRN, 100 mg at 10/09/17 0901  •  docusate sodium (COLACE) capsule 100 mg, 100 mg, Oral, BID PRN, Addy Ortiz MD  •  famotidine (PEPCID) tablet 20 mg, 20 mg, Oral, BID, Josey Fritz, APRN, 20 mg at 10/09/17 1654  •  HYDROmorphone (DILAUDID) injection 0.5 mg, 0.5 mg, Intravenous, Q2H PRN **AND** naloxone (NARCAN) injection 0.1 mg, 0.1 mg, Intravenous, Q5 Min PRN, Addy Ortiz MD  •  influenza vac split quad (FLUZONE,FLUARIX,AFLURIA) injection 0.5 mL, 0.5 mL, Intramuscular, During Hospitalization, Eleno Rea MD  •  ketorolac (TORADOL) injection 15 mg, 15 mg, Intravenous, Q6H, Josey Fritz APRN, 15 mg at 10/09/17 1654  •  Magnesium Sulfate 2 gram Bolus, followed by 8 gram infusion (total Mg dose 10 grams)- Mg less than or equal to 1mg/dL, 2 g, Intravenous, PRN **OR** Magnesium Sulfate 6 gram Infusion (2 gm x 3) -Mg 1.1 -1.5 mg/dL, 2 g, Intravenous, PRN **OR** magnesium sulfate 4 gram infusion- Mg 1.6-1.9 mg/dL, 4 g, Intravenous, PRN, ZAC Foster, Last Rate: 25 mL/hr at 10/08/17 1431, 4 g at 10/08/17 1431  •  melatonin sublingual tablet 5 mg, 5 mg, Sublingual, Nightly PRN, ZAC Foster  •  ondansetron (ZOFRAN) injection 4 mg, 4 mg, Intravenous, Q6H PRN, ZAC Foster  •  ondansetron (ZOFRAN) tablet 4 mg, 4 mg, Oral, Q6H PRN **OR** ondansetron (ZOFRAN) injection  4 mg, 4 mg, Intravenous, Q6H PRN, Addy Ortiz MD  •  oxyCODONE (ROXICODONE) immediate release tablet 10 mg, 10 mg, Oral, Q4H PRN, ZAC Foster, 10 mg at 10/09/17 1809  •  oxyCODONE-acetaminophen (PERCOCET) 5-325 MG per tablet 1 tablet, 1 tablet, Oral, Q4H PRN, Addy Ortiz MD  •  oxyCODONE-acetaminophen (PERCOCET) 5-325 MG per tablet 2 tablet, 2 tablet, Oral, Q4H PRN, Addy Ortiz MD  •  pantoprazole (PROTONIX) EC tablet 40 mg, 40 mg, Oral, Q AM, ZAC Foster, 40 mg at 10/09/17 0552  •  Pharmacy to dose vancomycin, , Does not apply, Continuous PRN, ZAC Foster  •  potassium chloride (MICRO-K) CR capsule 40 mEq, 40 mEq, Oral, PRN, 40 mEq at 10/09/17 1654 **OR** potassium chloride (KLOR-CON) packet 40 mEq, 40 mEq, Oral, PRN **OR** potassium chloride 10 mEq in 100 mL IVPB, 10 mEq, Intravenous, Q1H PRN, ZAC Foster  •  sodium chloride 0.9 % flush 1-10 mL, 1-10 mL, Intravenous, PRN, ZAC Foster  •  sodium chloride 0.9 % flush 1-10 mL, 1-10 mL, Intravenous, PRN, Addy Ortiz MD  •  Insert peripheral IV, , , Once **AND** sodium chloride 0.9 % flush 10 mL, 10 mL, Intravenous, PRN, Adelaide Anne, ZAC  •  sodium chloride 0.9 % infusion, 100 mL/hr, Intravenous, Continuous, ZAC Fosetr, Stopped at 10/08/17 1035  •  sodium chloride 0.9 % infusion, 100 mL/hr, Intravenous, Continuous, Addy Ortiz MD  •  sodium chloride 0.9 % infusion, 100 mL/hr, Intravenous, Continuous, Addy Ortiz MD, Last Rate: 100 mL/hr at 10/09/17 1215, 100 mL/hr at 10/09/17 1215  •  traMADol (ULTRAM) tablet 50 mg, 50 mg, Oral, 4x Daily PRN, ZAC Foster  •  vancomycin IVPB 1500 mg in 0.9% NaCl (Premix) 500 mL, 15 mg/kg, Intravenous, Q12H, Saurabh Ramirez [facility], 1,500 mg at 10/09/17 1527    Antibiotics:  IV Anti-Infectives     Ordered     Dose/Rate Route Frequency Start Stop    10/09/17 1738  cefTRIAXone (ROCEPHIN) IVPB 2 g      Ordering Provider:  Quentin Nunes MD    2 g  over 30 Minutes Intravenous Every 24 Hours 10/09/17 1800      10/08/17 1408  ceFAZolin in dextrose (ANCEF) IVPB solution 2 g     Ordering Provider:  Addy Ortiz MD    2 g Intravenous Once 10/09/17 0600 10/09/17 0616    10/08/17 0438  vancomycin IVPB 1500 mg in 0.9% NaCl (Premix) 500 mL     Ordering Provider:  Saurabh Ramirez RPH    15 mg/kg × 101 kg  over 90 Minutes Intravenous Every 12 Hours 10/08/17 1400      10/08/17 0433  Pharmacy to dose vancomycin     Ordering Provider:  Josey Fritz APRN     Does not apply Continuous PRN 10/08/17 0419      10/07/17 2346  vancomycin IVPB 2000 mg in 0.9% Sodium Chloride (premix) 500 mL     Ordering Provider:  ZAC Beyer    20 mg/kg × 95.3 kg Intravenous Once 10/07/17 2348 10/08/17 0311    10/07/17 2346  piperacillin-tazobactam (ZOSYN) 4.5 g/100 mL 0.9% NS IVPB (mbp)     Ordering Provider:  ZAC Beyer    4.5 g Intravenous Once 10/07/17 2348 10/08/17 0150            Review of Systems:  Pertinent positives include activity change, appetitie change, fatigue, chills,   Palpitations  Vomiting  Arthralgias, joint swelling,  No fever    Rest of ROS reviewed and were negative    Physical Exam:   Vital Signs  Temp (24hrs), Av.8 °F (37.1 °C), Min:98.5 °F (36.9 °C), Max:99.5 °F (37.5 °C)    Temp  Min: 98.5 °F (36.9 °C)  Max: 99.5 °F (37.5 °C)  BP  Min: 110/70  Max: 136/70  Pulse  Min: 97  Max: 110  Resp  Min: 18  Max: 20  No Data Recorded    GENERAL: Awake and alert, in no acute distress.   HEENT: Normocephalic, atraumatic.  PERRL. EOMI. No conjunctival injection. No icterus. Oropharynx clear without evidence of thrush or exudate. No evidence of peridontal disease.      HEART: RRR; No murmur, rubs, gallops.   LUNGS: Clear to auscultation bilaterally without wheezing, rales, rhonchi. Normal respiratory effort. Nonlabored. No dullness.  ABDOMEN: Soft, nontender, nondistended. Positive bowel  sounds. No rebound or guarding. NO mass or HSM.  EXT:  No cyanosis, clubbing or edema. No cord.    MSK: left knee wrapped, bilateral wrists wrapped  SKIN: Warm and dry without cutaneous eruptions on Inspection/palpation.    NEURO: Oriented to PPT. No focal deficits on motor/sensory exam at arms/legs.  PSYCHIATRIC: Normal insight and judgement. Cooperative with PE    Laboratory Data      Results from last 7 days  Lab Units 10/09/17  0550 10/08/17  1513 10/08/17  0515 10/07/17  2103   WBC 10*3/mm3  --   --  4.99 4.94   HEMOGLOBIN g/dL 12.0* 12.3* 12.8* 14.9   HEMATOCRIT % 36.7* 36.7* 38.0* 43.3   PLATELETS 10*3/mm3  --   --  308 347       Results from last 7 days  Lab Units 10/09/17  0550   SODIUM mmol/L 141   POTASSIUM mmol/L 3.3*   CHLORIDE mmol/L 107   CO2 mmol/L 22.0   BUN mg/dL 11   CREATININE mg/dL 0.70   GLUCOSE mg/dL 79   CALCIUM mg/dL 8.2*       Results from last 7 days  Lab Units 10/08/17  0515   ALK PHOS U/L 93   BILIRUBIN mg/dL 0.4   ALT (SGPT) U/L 92*   AST (SGOT) U/L 40*       Results from last 7 days  Lab Units 10/07/17  2103   SED RATE mm/hr 95*       Results from last 7 days  Lab Units 10/07/17  2133   CRP mg/dL 9.22*       Results from last 7 days  Lab Units 10/08/17  0515   LACTATE mmol/L 0.9           Results from last 7 days  Lab Units 10/09/17  1300   VANCOMYCIN TR mcg/mL 11.90     Estimated Creatinine Clearance: 145 mL/min (by C-G formula based on Cr of 0.7).      Microbiology:  Blood Culture   Date Value Ref Range Status   10/07/2017 No growth at 24 hours  Preliminary   10/07/2017 No growth at 24 hours  Preliminary                                Radiology:  Imaging Results (last 72 hours)     Procedure Component Value Units Date/Time    XR Chest 1 View [[id number]]  (Abnormal) Collected:  10/07/17 2111     Updated:  10/07/17 2239    Narrative:       EXAM:    XR Chest, 1 View    CLINICAL HISTORY:    57 years old, male; Pain; Chest pain; Type not specified; Additional info:   Redness and swelling  of left forearm, Tachycardia, rheumatoid arthritis    TECHNIQUE:    Frontal view of the chest.    COMPARISON:    [facility] 6/4/2014 12:51:11 PM    FINDINGS:    Lungs:  Unremarkable.  No consolidation.    Pleural space:  Unremarkable.  No pneumothorax.    Heart:  Unremarkable.  No cardiomegaly.    Mediastinum:  Unremarkable.    Bones/joints:  Old healed right posterior rib fractures.      Impression:         No acute abnormality.    THIS DOCUMENT HAS BEEN ELECTRONICALLY SIGNED BY ARMAND AMEZQUITA MD    XR Knee 1 or 2 View Left [[id number]]  (Abnormal) Collected:  10/08/17 0005     Updated:  10/08/17 0041    Narrative:       EXAM:    XR Left Knee, 1 or 2 views    CLINICAL HISTORY:    57 years old, male; Pain; Knee; Left; Additional info: Ra with swelling and   pain    TECHNIQUE:    Frontal and/or lateral views of the left knee.    COMPARISON:    No relevant prior studies available.    FINDINGS:    Bones/joints:  Severe tricompartmental joint space narrowing, worst   laterally.  No acute fracture.  No dislocation.    Soft tissues:  Soft tissue swelling at the knee.  Small suprapatellar   effusion.      Impression:       1.  Severe tricompartmental joint space narrowing, worst laterally.  2.  Soft tissue swelling at the knee.  Small suprapatellar effusion.    THIS DOCUMENT HAS BEEN ELECTRONICALLY SIGNED BY ARMAND AMEZQUITA MD    XR Wrist 3+ View Left [[id number]]  (Abnormal) Collected:  10/08/17 0005     Updated:  10/08/17 0048    Narrative:       EXAM:    XR Left Wrist Complete, 3 or More Views    CLINICAL HISTORY:    57 years old, male; Pain; Wrist; Left; Additional info: Ra with swelling and   pain    TECHNIQUE:    Frontal, lateral and oblique views of the left wrist.    COMPARISON:    No relevant prior studies available.    FINDINGS:    Bones/joints: No fracture. Severe degenerative changes consistent with   rheumatoid arthritis (worse on the left wrist) with mild palmar subluxation of   the carpal bones in  relation to the radius and ulna.  Carpal joint space   narrowing.  Severe radiocarpal joint space narrowing.  Second and third   metacarpophalangeal periarticular lucencies.  No acute fracture.    Soft tissues: Soft tissue swelling of the wrist.  No radiopaque foreign body.      Impression:         Severe degenerative changes consistent with rheumatoid arthritis (worse on   the left wrist) with mild palmar subluxation of the carpal bones in relation to   the radius and ulna.    THIS DOCUMENT HAS BEEN ELECTRONICALLY SIGNED BY NASIMA CLAROS MD    XR Wrist 3+ View Right [[id number]]  (Abnormal) Collected:  10/08/17 0005     Updated:  10/08/17 0049    Addenda:        IMPRESSION:       1.  No acute fracture.  2.  Degenerative changes of the right wrist consistent with rheumatoid   arthritis.    THIS DOCUMENT HAS BEEN ELECTRONICALLY SIGNED BY NASIMA CLAROS MD  Signed:  10/08/17 0049 by Nasima Claros MD    Narrative:       EXAM:    XR Right Wrist Complete, 3 or More Views    CLINICAL HISTORY:    57 years old, male; Pain; Wrist; Right; Additional info: Ra with swelling and   pain    TECHNIQUE:    Frontal, lateral and oblique views of the right wrist.    COMPARISON:    Left wrist radiograph same date    FINDINGS:    Bones/joints:  No acute fracture.  Radiocarpal joint space narrowing.    Ulnocarpal joint space narrowing.  Diffuse carpal joint space narrowing.    Carpal periarticular lucencies consistent with rheumatoid arthritis.  Fifth   carpometacarpal periarticular lucencies.  First and fifth metacarpophalangeal   periarticular lucencies.  No dislocation.    Soft tissues:  Unremarkable.  No radiopaque foreign body.      Impression:       1.  No acute fracture.  2.  Degenerative changes of the right wrist consistent with rheumatoid   arthritis.    THIS DOCUMENT HAS BEEN ELECTRONICALLY SIGNED BY NASIMA CLAROS MD    XR Chest 1 View [[id number]] Collected:  10/08/17 1547     Updated:  10/09/17 1053    Narrative:       EXAMINATION:  XR CHEST 1 VW - 10/08/2017     INDICATION: M06.9-Rheumatoid arthritis, unspecified; R00.0-Tachycardia,  unspecified; M00.9-Pyogenic arthritis, unspecified.     COMPARISON: 10/07/2017.     FINDINGS: Portable chest reveals deep line catheter on the right with  tip in the SVC. Heart is borderline enlarged. Underlying chronic change  is seen within the lung fields. Healed fracture is seen of the right  posterior ribs. No pleural effusion or pneumothorax. Pulmonary  vascularity is within normal limits.        Impression:       Deep line catheter identified on the right with tip in the  SVC. No evidence of pneumothorax.     DICTATED:     10/08/2017  EDITED:         10/08/2017        This report was finalized on 10/9/2017 10:51 AM by Dr. Yue Fierro MD.               Impression:   Septic arthritis left knee, left, right wrist  S. Aureus disseminated infection  Rheumatoid arthritis  Immunocompromised host on chronic prednisone    PLAN/RECOMMENDATIONS:   Thank you for asking us to see Richard Guevara, I recommend the following:  Cont vancomycin  F/u op cultures, bl cx  Change zosyn to ctx 2 g iv daily    Needs 6 weeks of iv abx  Serial esr if not downtrending may require repeat washout of joints    D/w family        Quentin Nunes MD  10/9/2017  6:17 PM

## 2017-10-09 NOTE — PROGRESS NOTES
"  SUBJECTIVE  Patient states he is feeling much better this morning.  Pain in wrists and knee have improved.    OBJECTIVE  Temp (24hrs), Av.6 °F (36.4 °C), Min:97 °F (36.1 °C), Max:98.7 °F (37.1 °C)    Blood pressure 136/70, pulse 106, temperature 98.5 °F (36.9 °C), temperature source Oral, resp. rate 18, height 72\" (182.9 cm), weight 223 lb 1.6 oz (101 kg), SpO2 94 %.    Lab Results (last 24 hours)     Procedure Component Value Units Date/Time    Magnesium [[id number]]  (Normal) Collected:  10/08/17 0515    Specimen:  Blood Updated:  10/08/17 0720     Magnesium 1.7 mg/dL     T4, Free [[id number]]  (Normal) Collected:  10/08/17 0515    Specimen:  Blood Updated:  10/08/17 0720     Free T4 1.15 ng/dL     Comprehensive Metabolic Panel [[id number]]  (Abnormal) Collected:  10/08/17 0515    Specimen:  Blood Updated:  10/08/17 0720     Glucose 86 mg/dL      BUN 15 mg/dL      Creatinine 0.70 mg/dL      Sodium 137 mmol/L      Potassium 3.1 (L) mmol/L      Chloride 104 mmol/L      CO2 23.0 mmol/L      Calcium 9.1 mg/dL      Total Protein 5.8 g/dL      Albumin 3.10 (L) g/dL      ALT (SGPT) 92 (H) U/L      AST (SGOT) 40 (H) U/L      Alkaline Phosphatase 93 U/L      Total Bilirubin 0.4 mg/dL      eGFR Non African Amer 117 mL/min/1.73      Globulin 2.7 gm/dL      A/G Ratio 1.1 (L) g/dL      BUN/Creatinine Ratio 21.4     Anion Gap 10.0 mmol/L     Narrative:       National [employer] Guidelines    Stage     Description        GFR  1         Normal or High     90+  2         Mild decrease      60-89  3         Moderate decrease  30-59  4         Severe decrease    15-29  5         Kidney failure     <15    TSH [[id number]]  (Normal) Collected:  10/08/17 0515    Specimen:  Blood Updated:  10/08/17 0720     TSH 2.630 mIU/mL     Procalcitonin [[id number]] Collected:  10/08/17 0515    Specimen:  Blood Updated:  10/08/17 0721     Procalcitonin 0.11 ng/mL     Narrative:       As a Marker for Sepsis (Non-Neonates):   1. <0.5 ng/mL " represents a low risk of severe sepsis and/or septic shock.  2. >2 ng/mL represents a high risk of severe sepsis and/or septic shock.    As a Marker for Lower Respiratory Tract Infections that require antibiotic therapy:    PCT on Admission     Antibiotic Therapy       6-12 Hrs later  > 0.5                Strongly Recommended             >0.25 - <0.5         Recommended  0.1 - 0.25           Discouraged              Remeasure/reassess PCT  <0.1                 Strongly Discouraged     Remeasure/reassess PCT                     PCT values of < 0.5 ng/mL do not exclude an infection, because localized infections (without systemic signs) may be associated with such low concentrations, or a systemic infection in its initial stages (< 6 hours). Furthermore, increased PCT can occur without infection. PCT concentrations between 0.5 and 2.0 ng/mL should be interpreted taking into account the patient's history. It is recommended to retest PCT within 6-24 hours if any concentrations < 2 ng/mL are obtained.    Body fluid cell count - Body Fluid, [[id number]]  (Abnormal) Collected:  10/07/17 2347    Specimen:  Synovial Fluid from Wrist, Left Updated:  10/08/17 1024     Color, Fluid Brown     Appearance, Fluid Turbid (A)     WBC, Fluid 133183 /mm3       Corrected result. Previous result was 174 /mm3 on 10/8/2017 at 0212 EDT  Corrected result. Previous result was 174.200 /mm3 on 10/8/2017 at 0243 EDT        RBC, Fluid 22130 /mm3     Body fluid cell count - Body Fluid, [[id number]]  (Abnormal) Collected:  10/07/17 2347    Specimen:  Synovial Fluid from Knee, Left Updated:  10/08/17 1032     Color, Fluid Yellow     Appearance, Fluid Turbid (A)     WBC, Fluid 407691 /mm3       Corrected result. Previous result was 201.000 /mm3 on 10/8/2017 at 0244 EDT        RBC, Fluid 53267 /mm3     Body Fluid Cell Count With Differential - Synovial Fluid, Wrist, Left [[id number]] Collected:  10/07/17 2347    Specimen:  Synovial Fluid from Wrist, Left  Updated:  10/08/17 1040    Narrative:       The following orders were created for panel order Body Fluid Cell Count With Differential - Synovial Fluid, Wrist, Left.  Procedure                               Abnormality         Status                     ---------                               -----------         ------                     Body fluid cell count - ...[[id number]]  Abnormal            Edited Result - FINAL      Body fluid differential ...[[id number]]                      Edited Result - FINAL        Please view results for these tests on the individual orders.    Body Fluid Cell Count With Differential - Synovial Fluid, Knee, Left [[id number]] Collected:  10/07/17 2347    Specimen:  Synovial Fluid from Knee, Left Updated:  10/08/17 1040    Narrative:       The following orders were created for panel order Body Fluid Cell Count With Differential - Synovial Fluid, Knee, Left.  Procedure                               Abnormality         Status                     ---------                               -----------         ------                     Body fluid cell count - ...[[id number]]  Abnormal            Edited Result - FINAL      Body fluid differential ...[[id number]]                      Edited Result - FINAL        Please view results for these tests on the individual orders.    Body fluid differential - Body Fluid, [[id number]] Collected:  10/07/17 2347    Specimen:  Synovial Fluid from Knee, Left Updated:  10/08/17 1040     Neutrophils, Fluid 94 %       Appended report.  These results have been appended to a previously final verified report.        Lymphocytes, Fluid 1 %       Appended report.  These results have been appended to a previously final verified report.        Monocytes, Fluid 5 %       Appended report.  These results have been appended to a previously final verified report.       Body fluid differential - Body Fluid, [[id number]] Collected:  10/07/17 2347    Specimen:  Synovial Fluid from  Wrist, Left Updated:  10/08/17 1040     Neutrophils, Fluid 95 %       Appended report.  These results have been appended to a previously final verified report.        Lymphocytes, Fluid 1 %       Appended report.  These results have been appended to a previously final verified report.        Monocytes, Fluid 3 %       Appended report.  These results have been appended to a previously final verified report.        Mesothelial Cells, Fluid 1 %       Appended report.  These results have been appended to a previously final verified report.       Body Fluid Cell Count With Differential - Synovial Fluid, Wrist, Right [[id number]] Collected:  10/07/17 2348    Specimen:  Synovial Fluid from Wrist, Right Updated:  10/08/17 1149    Narrative:       The following orders were created for panel order Body Fluid Cell Count With Differential - Synovial Fluid, Wrist, Right.  Procedure                               Abnormality         Status                     ---------                               -----------         ------                     Body fluid cell count - ...[[id number]]  Abnormal            Edited Result - FINAL      Body fluid differential ...[[id number]]                      Edited Result - FINAL        Please view results for these tests on the individual orders.    Body fluid cell count - Body Fluid, [[id number]]  (Abnormal) Collected:  10/07/17 2348    Specimen:  Synovial Fluid from Wrist, Right Updated:  10/08/17 1149     Color, Fluid Red     Appearance, Fluid Turbid (A)     WBC, Fluid 585454 /mm3       Specimen clotted  Corrected result. Previous result was 137.000 /mm3 on 10/8/2017 at 0242 EDT        RBC, Fluid 137550 /mm3       Specimen clotted       Body fluid differential - Body Fluid, [[id number]] Collected:  10/07/17 2348    Specimen:  Synovial Fluid from Wrist, Right Updated:  10/08/17 1149     Neutrophils, Fluid 95 %      Lymphocytes, Fluid 1 %      Monocytes, Fluid 3 %      Macrophage, Fluid 1 %      Hemoglobin & Hematocrit, Blood [[id number]]  (Abnormal) Collected:  10/08/17 1513    Specimen:  Blood Updated:  10/08/17 1522     Hemoglobin 12.3 (L) g/dL      Hematocrit 36.7 (L) %     Basic Metabolic Panel [[id number]]  (Abnormal) Collected:  10/08/17 1514    Specimen:  Blood Updated:  10/08/17 1542     Glucose 101 (H) mg/dL      BUN 16 mg/dL      Creatinine 0.70 mg/dL      Sodium 138 mmol/L      Potassium 3.6 mmol/L      Chloride 109 mmol/L      CO2 25.0 mmol/L      Calcium 8.5 (L) mg/dL      eGFR Non African Amer 117 mL/min/1.73      BUN/Creatinine Ratio 22.9     Anion Gap 4.0 mmol/L     Narrative:       National Kidney Foundation Guidelines    Stage     Description        GFR  1         Normal or High     90+  2         Mild decrease      60-89  3         Moderate decrease  30-59  4         Severe decrease    15-29  5         Kidney failure     <15    Blood Culture - Blood, [[id number]]  (Normal) Collected:  10/07/17 2141    Specimen:  Blood from Arm, Right Updated:  10/08/17 2201     Blood Culture No growth at 24 hours    Blood Culture - Blood, [[id number]]  (Normal) Collected:  10/07/17 2141    Specimen:  Blood from Arm, Right Updated:  10/08/17 2201     Blood Culture No growth at 24 hours    Basic Metabolic Panel [[id number]] Collected:  10/09/17 0550    Specimen:  Blood Updated:  10/09/17 0615    Magnesium [[id number]] Collected:  10/09/17 0550    Specimen:  Blood Updated:  10/09/17 0615    Body Fluid Culture - Synovial Fluid, Wrist, Left [[id number]]  (Normal) Collected:  10/08/17 0100    Specimen:  Synovial Fluid from Wrist, Left Updated:  10/09/17 0639     BF Culture Culture in progress     Gram Stain Result Many (4+) WBCs seen      No organisms seen    Body Fluid Culture - Synovial Fluid, Wrist, Right [[id number]]  (Normal) Collected:  10/08/17 0140    Specimen:  Synovial Fluid from Wrist, Right Updated:  10/09/17 0639     BF Culture Culture in progress     Gram Stain Result Many (4+) WBCs seen      No  organisms seen    Body Fluid Culture - Synovial Fluid, Knee, Left [[id number]]  (Normal) Collected:  10/08/17 0130    Specimen:  Synovial Fluid from Knee, Left Updated:  10/09/17 0640     BF Culture Culture in progress     Gram Stain Result Many (4+) WBCs seen      No organisms seen    Hemoglobin & Hematocrit, Blood [[id number]]  (Abnormal) Collected:  10/09/17 0550    Specimen:  Blood Updated:  10/09/17 0641     Hemoglobin 12.0 (L) g/dL      Hematocrit 36.7 (L) %             PHYSICAL EXAM  Bilateral upper extremities: Dressings are clean, dry and intact.  Wrist range of motion improving.  Edema in wrist and hands improving.  Intact EPL, FPL, EDC, FDP, FDS, interosseous, wrist flexion, wrist extension, biceps, triceps, deltoid. Sensation intact light touch to median, radial, ulnar, and axillary nerves.  Capillary refill less than 2 seconds to all digits.     Left lower extremity:Dressing intact with spotty serosanguineous drainage.  Knee range of motion remains diminished but is improving.  Intact EHL, FHL, tibialis anterior, and gastrocsoleus. Sensation intact to light touch to deep peroneal, superficial peroneal, sural, saphenous, tibial nerves. 2+ palpable DP and PT pulses.      Principal Problem:    Pyogenic arthritis of multiple sites  Active Problems:    Rheumatoid arthritis    Gout    Hypertension    CAD (coronary artery disease)    HLD (hyperlipidemia)    Acute joint pain    Joint erythema    Altered mental status    Immobility    Weakness    Rheumatoid arthritis flare    Tachycardia      PLAN / DISPOSITION:  1 Day Post-Op bilateral wrist arthrotomies and left knee arthrotomy with irrigation and debridement for septic arthritis    Protected weight bearing as tolerated bilateral upper extremities, left lower extremity, knee and wrist range of motion as tolerated   Pain control  PT/OT for post op mobilization and medical equipment needs   Continue empiric antibiotic coverage.  Follow up intraoperative  cultures.  Consult infectious disease for antibiotic recommendations  TEDs and SCD's bilateral lower extremities   DVT prophylaxis per primary team    Social work for discharge planning.   Dressing to remain in place for 7 days. May remove on POD#7. If no drainage, may shower on POD#10. No submerging wound in water. If drainage is noted, sterile dressing should be placed and wound checked daily. No showering until wound has remained dry for 72 consecutive hours.   Follow up in 2 weeks for re-assessment.      Addy Ortiz MD  10/09/17  7:09 AM

## 2017-10-09 NOTE — PROGRESS NOTES
Pharmacy Consult-Vancomycin Dosing    Richard Guevara is a 56 yo man admitted 10/7/17 for pyogenic arthritis of both wrists and his left knee.  He has a history of RA and is on chronic immunosuppressants.  Vancomycin and piperacillin/tazobactam were started empirically.  Pt to OR 10/8/17 for surgical arthrotomy of both wrists and left knee with irrigation and debridement.  Preliminary cultures are growing staph aureus.    IID Consult: pending    Goal Trough: 15 mcg/ml    Current Antimicrobial Therapy  IV Anti-Infectives       Ordered     Dose/Rate Route Frequency Start Stop      10/08/17 1408  ceFAZolin in dextrose (ANCEF) IVPB solution 2 g     Ordering Provider:  Addy Ortiz MD    2 g Intravenous Once 10/09/17 0600 10/09/17 0616    10/08/17 0438  vancomycin IVPB 1500 mg in 0.9% NaCl (Premix) 500 mL     Ordering Provider:  Saurabh Ramirez RPH    15 mg/kg × 101 kg  over 90 Minutes Intravenous Every 12 Hours 10/08/17 1400      10/08/17 0433  piperacillin-tazobactam (ZOSYN) 4.5 g/100 mL 0.9% NS IVPB (mbp)     Ordering Provider:  ZAC Foster    4.5 g  over 4 Hours Intravenous Every 8 Hours 10/08/17 0800      10/08/17 0433  Pharmacy to dose vancomycin     Ordering Provider:  ZAC Foster     Does not apply Continuous PRN 10/08/17 0419      10/07/17 2346  vancomycin IVPB 2000 mg in 0.9% Sodium Chloride (premix) 500 mL     Ordering Provider:  ZAC Beyer    20 mg/kg × 95.3 kg Intravenous Once 10/07/17 2348 10/08/17 0311    10/07/17 2346  piperacillin-tazobactam (ZOSYN) 4.5 g/100 mL 0.9% NS IVPB (mbp)     Ordering Provider:  ZAC Beyer    4.5 g Intravenous Once 10/07/17 2348 10/08/17 0150        Allergies  Allergies as of 10/07/2017   • (No Known Allergies)     Labs    Results from last 7 days     Lab Units 10/09/17  0550 10/08/17  1514 10/08/17  0515   BUN mg/dL 11 16 15   CREATININE mg/dL 0.70 0.70 0.70     Results from last 7 days     Lab Units 10/08/17  0523  "10/07/17  2103   WBC 10*3/mm3 4.99 4.94     Evaluation of Dosing     Ht - 72\" (182.9 cm)  Wt - 223 lb 1.6 oz (101 kg)    Estimated Creatinine Clearance: 145 mL/min (by C-G formula based on Cr of 0.7).    Intake & Output (last 7 days)         10/02 0701 - 10/03 0700 10/03 0701 - 10/04 0700 10/04 0701 - 10/05 0700 10/05 0701 - 10/06 0700 10/06 0701 - 10/07 0700 10/07 0701 - 10/08 0700 10/08 0701 - 10/09 0700 10/09 0701 - 10/10 0700      P.O.       1520     I.V. (mL/kg)       2775 (27.4) 999 (9.9)    IV Piggyback      1000 100 100    Total Intake(mL/kg)      1000 (9.9) 4395 (43.4) 1099 (10.9)    Urine (mL/kg/hr)      525 1450 (0.6) 1500 (1.9)    Stool        0 (0)    Blood       75 (0)     Total Output           525 1525 1500    Net           +475 +[phone] -401                Unmeasured Stool Occurrence        1 x            Microbiology and Radiology  Microbiology Results (last 10 days)       Procedure Component Value - Date/Time      Body Fluid Culture - Synovial Fluid, Wrist, Right [[id number]]  (Abnormal) Collected:  10/08/17 0140    Lab Status:  Preliminary result Specimen:  Synovial Fluid from Wrist, Right Updated:  10/09/17 1050     BF Culture --      Moderate growth (3+) Staphylococcus aureus (A)     Gram Stain Result Many (4+) WBCs seen      No organisms seen    Body Fluid Culture - Synovial Fluid, Knee, Left [[id number]]  (Abnormal) Collected:  10/08/17 0130    Lab Status:  Preliminary result Specimen:  Synovial Fluid from Knee, Left Updated:  10/09/17 1052     BF Culture --      Moderate growth (3+) Staphylococcus aureus (A)     Gram Stain Result Many (4+) WBCs seen      No organisms seen    Body Fluid Culture - Synovial Fluid, Wrist, Left [[id number]]  (Abnormal) Collected:  10/08/17 0100    Lab Status:  Preliminary result Specimen:  Synovial Fluid from Wrist, Left Updated:  10/09/17 1051     BF Culture --      Heavy growth (4+) Staphylococcus aureus (A)     Gram Stain Result Many (4+) WBCs seen      No organisms " seen    Blood Culture - Blood, [[id number]]  (Normal) Collected:  10/07/17 2141    Lab Status:  Preliminary result Specimen:  Blood from Arm, Right Updated:  10/08/17 2201     Blood Culture No growth at 24 hours    Blood Culture - Blood, [[id number]]  (Normal) Collected:  10/07/17 2141    Lab Status:  Preliminary result Specimen:  Blood from Arm, Right Updated:  10/08/17 2201     Blood Culture No growth at 24 hours        Evaluation of Level    Lab Results   Component Value Date    [facility] 11.90 10/09/2017       Assessment/Plan:     Despite loading dose of vancomycin, it is probable that the vancomycin level is not at true steady state and additional accumulation will occur.  Patient reports feeling better, and remains afebrile.  Will continue current dose of 1500 mg IV q12h and recheck the trough in 48 hours.  Pharmacy Service will continue to follow the patient's clinical progress and monitor for evidence of vancomycin-induced adverse effects.    Fartun Dubois, PharmD, BCPS

## 2017-10-09 NOTE — PROGRESS NOTES
Discharge Planning Assessment  [facility]     Patient Name: Richard Guevara  MRN: [id number]  Today's Date: 10/9/2017    Admit Date: 10/7/2017          Discharge Needs Assessment       10/09/17 1546    Living Environment    Lives With parent(s)    Living Arrangements house    Home Accessibility stairs to enter home    Number of Stairs to Enter Home 2    Stair Railings at Home none    Type of Financial/Environmental Concern unable to afford medication(s)    Transportation Available car;family or friend will provide    Living Environment    Provides Primary Care For no one    Quality Of Family Relationships supportive    Able to Return to Prior Living Arrangements yes    Discharge Needs Assessment    Concerns To Be Addressed discharge planning concerns    Readmission Within The Last 30 Days no previous admission in last 30 days    Outpatient/Agency/Support Group Needs homecare agency (specify level of care)    Community Agency Name(S) Pt is being followed by [facility] in [place]    Anticipated Changes Related to Illness none    Equipment Currently Used at Home wheelchair;walker, standard;cane, straight    Equipment Needed After Discharge none    Discharge Facility/Level Of Care Needs other (see comments);home with home health;nursing facility, skilled    Discharge Disposition still a patient;home healthcare service;rehab facility    Discharge Planning Comments Darrius (son) [phone]            Discharge Plan       10/09/17 1549    Case Management/Social Work Plan    Plan IDP    Patient/Family In Agreement With Plan yes    Additional Comments CM met w pt and son who lives w his mother in [place]. Pt has transportation home or to facility at d/c w family. Pt has documented DME but states that he doesn't usually have to use them. Lately he has been using a walker but needs a wheelchair possibly, he does not have a preference of DME company and hasn't worked w one-anywhere in [place] or [place] is fine. If  "pt needs rehab for PT at d/c he would like to use any facility in [facility] or [place] if needed. Pt was being followed by [facility] in [facility]. this was confirmed by CM he was recieving PT/OT. Pt is not medically ready for d/c, and d/c needs are TBD at this time. SW will be helping pt w med resources. Pt does not have a regular PCP and stated he has \"other dr\" he sees, but was only able to tell CM about his \"arthritis Dr\". He declined wanting a PCP to be set up for him and stated he would f/u w specialists. Pt goal is to go home w HH or go to rehab in [place] depending on medical need at d/c. DME may be ordered at d/c if needed. CM will continue to follow.        Discharge Placement     No information found        Expected Discharge Date and Time     Expected Discharge Date Expected Discharge Time    Oct 11, 2017               Demographic Summary       10/09/17 1544    Referral Information    Admission Type inpatient    Arrived From admitted as an inpatient    Referral Source nursing;physician    Reason For Consult discharge planning    Record Reviewed history and physical    Contact Information    Permission Granted to Share Information With     Primary Care Physician Information    Name TIN VICTORIA            Functional Status       10/09/17 1543    Functional Status Prior    Ambulation 0-->independent    Transferring 0-->independent    Toileting 0-->independent    Bathing 0-->independent    Dressing 0-->independent    Eating 0-->independent    Communication 0-->understands/communicates without difficulty    Swallowing 0-->swallows foods/liquids without difficulty    IADL    Medications independent    Meal Preparation independent    Housekeeping independent    Laundry independent    Shopping independent    Oral Care independent    Activity Tolerance    Usual Activity Tolerance good    Employment/Financial    Financial Concerns none    Employment/Finance Comments Pt states his medicare " covers most of his meds but he has skipped meds in the past. CM contacted Meagan sorenson  who will meet w pt to offer resources re: medication coverage            Psychosocial     None            Abuse/Neglect     None            Legal     None            Substance Abuse     None            Patient Forms     None          Gladys Young RN

## 2017-10-09 NOTE — THERAPY EVALUATION
Acute Care - Occupational Therapy Initial Evaluation  [facility]     Patient Name: Richard Guevara  : 1960  MRN: [id number]  Today's Date: 10/9/2017  Onset of Illness/Injury or Date of Surgery Date: 10/08/17  Date of Referral to OT: 10/08/17  Referring Physician: Angel    Admit Date: 10/7/2017       ICD-10-CM ICD-9-CM   1. Rheumatoid arthritis flare M06.9 714.0   2. Tachycardia R00.0 785.0   3. Pyogenic arthritis of multiple sites, due to unspecified organism M00.9 711.09   4. Impaired mobility and ADLs Z74.09 799.89   5. Impaired functional mobility, balance, gait, and endurance Z74.09 V49.89     Patient Active Problem List   Diagnosis   • Rheumatoid arthritis   • Gout   • Hypertension   • CAD (coronary artery disease)   • HLD (hyperlipidemia)   • Acute joint pain   • Joint erythema   • Altered mental status   • Immobility   • Weakness   • Rheumatoid arthritis flare   • Tachycardia   • Pyogenic arthritis of multiple sites     Past Medical History:   Diagnosis Date   • Altered mental status 10/8/2017   • CAD (coronary artery disease) 10/8/2017   • Erythematous condition 10/8/2017   • Gout 10/8/2017   • HLD (hyperlipidemia) 10/8/2017   • Hyperlipidemia    • Hypertension    • Immobility 10/8/2017   • Joint erythema 10/8/2017   • Joint pain 10/8/2017   • RA (rheumatoid arthritis)    • Rheumatoid arthritis 10/8/2017   • Tachycardia 10/8/2017   • Weakness 10/8/2017     Past Surgical History:   Procedure Laterality Date   • CORONARY ANGIOPLASTY WITH STENT PLACEMENT      x1, around  edith   • KNEE ARTHROTOMY Left 10/8/2017    Procedure: KNEE ARTHROTOMY;  Surgeon: Addy Ortiz MD;  Location:  [facility];  Service:    • SKIN BIOPSY      hx a few suspicious spots removed, only required removal, unsure of result   • WRIST ARTHROTOMY Bilateral 10/8/2017    Procedure: WRIST ARTHROTOMY;  Surgeon: Addy Ortiz MD;  Location:  [facility];  Service:           OT ASSESSMENT FLOWSHEET (last 72 hours)      OT  Evaluation       10/09/17 0850 10/09/17 0835 10/09/17 0800 10/09/17 0157 10/08/17 0723    Rehab Evaluation    Document Type evaluation  - evaluation;therapy note (daily note)  -       Subjective Information agree to therapy;complains of;pain;weakness  - no complaints;agree to therapy  -ST       Patient Effort, Rehab Treatment  good  -ST       Symptoms Noted During/After Treatment  fatigue;increased pain  -ST       Symptoms Noted Comment  RN notified   -       General Information    Patient Profile Review yes  - yes  -ST       Onset of Illness/Injury or Date of Surgery Date 10/08/17  -EH 10/07/17  -       Referring Physician Angel  -NIKITA Ortiz MD  -       General Observations Pt supine in bed with HOB elevated, son in room.  - pt supine upon arrival; IV and 02 NC intact; son present; B ACE bandages to wrists  -ST       Pertinent History Of Current Problem Pt presents to hospital after recent bout of flu with hx of RA and c/o [place] wrist pain, and L knee pain. Found to have septic arthritis now s/p I &D of [place] wrists and L knee. ROM as tolerated, WBAT each joint.  - Pt presents with L knee pain and B wrist pain d/t septic arthritis. S/P exploration and drainage of L knee, arthrotomy of radiocarpal/midcarpal w/drainage and exploration B wrists; WBAT BUE's, LLE with ROM as tolerated all joints.   -       Precautions/Limitations fall precautions;other (see comments)   WBAT BUE, LLE, ROM as tolerated.  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  - independent:;all household mobility;community mobility;transfer;feeding;grooming;dressing;bathing;home management   prior to RA flair, pt independent, uses SPC occassionally   -       Equipment Currently Used at Home wheelchair;walker, standard   was not using  prior  - wheelchair;cane, straight;walker, rolling  -ST  wheelchair;walker, standard  -AD     Plans/Goals Discussed With patient;agreed upon  - patient and family;agreed upon  -       Risks Reviewed patient:;LOB;increased discomfort  - patient and family:;LOB;nausea/vomiting;dizziness;increased discomfort;change in vital signs  -ST       Benefits Reviewed patient:;improve function;increase independence  - patient and family:;improve function;increase independence;increase strength;increase balance;decrease pain;increase knowledge  -       Barriers to Rehab previous functional deficit;medically complex  - previous functional deficit;medically complex  -ST       Living Environment    Lives With other (see comments)   mother  - other (see comments)   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   -       Living Arrangements house  - house  -       Home Accessibility stairs to enter home;tub/shower is not walk in   walk in and tub shower; one level  - stairs to enter home;tub/shower is not walk in   also with walk-in shower   -       Number of Stairs to Enter Home 2  - 2  -ST       Stair Railings at Home none  - none  -ST       Transportation Available    car;family or friend will provide  -AD car;family or friend will provide  -AD    Living Environment Comment Pt retired; has falls; son visits often  - son comes by frequently, pt retired; has falls   -ST       Clinical Impression    Date of Referral to OT  10/08/17  -ST       OT Diagnosis  impaired ADLs  -ST       Prognosis  good  -ST       Patient/Family Goals Statement  return to OF, dec. pain  -ST       Criteria for Skilled Therapeutic Interventions Met  yes  -ST       Rehab Potential  good, to achieve stated therapy goals  -ST       Therapy Frequency  daily  -ST       Anticipated Equipment Needs At Discharge  bedside commode   TBD further, has reacher  -ST       Anticipated Discharge Disposition  skilled nursing facility  -ST       Pain Assessment     Pain Assessment 0-10  - 0-10  -ST       Pain Score 8  - 8  -ST       Post Pain Score 8  - 8  -ST       Pain Type Acute pain  - Acute pain  -ST       Pain Location Knee   and L>R wrist  - Wrist  -ST       Pain Intervention(s) Repositioned;Ambulation/increased activity  - Repositioned  -       Response to Interventions tolerated  -        Vision Assessment/Intervention    Visual Impairment WFL  -EH WNL  -ST       Cognitive Assessment/Intervention    Current Cognitive/Communication Assessment functional  - functional  -       Orientation Status oriented x 4  - oriented x 4  -ST       Follows Commands/Answers Questions 100% of the time;able to follow single-step instructions  - 100% of the time  -       Personal Safety mild impairment;decreased awareness, need for assist;decreased awareness, need for safety  - mild impairment  -       Personal Safety Interventions fall prevention program maintained;gait belt;nonskid shoes/slippers when out of bed  - fall prevention program maintained;gait belt;nonskid shoes/slippers when out of bed  -       ROM (Range of Motion)    General ROM lower extremity range of motion deficits identified  - upper extremity range of motion deficits identified  -       General ROM Detail LLE knee flexion limited to ~45 degrees, extension limitied ~ 25 degrees.  - shoulder flexion/extension, elbow flexion/extension WFL, wrist deficits d/t ACE bandage, impaired digit ROM d/t RA & pain   -ST       MMT (Manual Muscle Testing)    General MMT Assessment  upper extremity strength deficits identified  -       General MMT Assessment Detail L Knee extension 1/5, knee flexion 3/5,DF 4/5. LLE knee extension 4/5, DF, PF 4/5.  - shoulder flexion and abd: 4/5; did not perform elbow MMT d/t pain, unable to perform wrist MMT d/t ACE bandages, unable to fully perform power grasp, lateral of pincer  tests d/t impaired ROM d/t ACE bandage and pain   -ST       Muscle Tone  Assessment    Muscle Tone Assessment   Bilateral Upper Extremities  -MARIANA      Bilateral Upper Extremities Muscle Tone Assessment   rigidity  -MARIANA      Bed Mobility, Assessment/Treatment    Bed Mobility, Assistive Device  bed rails;head of bed elevated  -ST       Bed Mob, Supine to Sit, [place] minimum assist (75% patient effort);2 person assist required  -EH minimum assist (75% patient effort);2 person assist required  -ST       Bed Mob, Sit to Supine, [place] not tested  -EH        Bed Mobility, Comment  difficulty with movement of trunk to EOB d/t limited use of BUE's to WB through-good movement of BLE's to EOB  -ST       Transfer Assessment/Treatment    Transfers, Sit-Stand [place] moderate assist (50% patient effort);2 person assist required   + 1 to assist with stablization of Walker/ IV pole.  -EH moderate assist (50% patient effort);2 person assist required  -ST       Transfers, Stand-Sit [place] maximum assist (25% patient effort);2 person assist required   +3rd for equipment, pt son assists LLE to chair  - maximum assist (25% patient effort);2 person assist required  -ST       Transfers, Sit-Stand-Sit, Assist Device rolling platform walker;other (see comments)   KI  - rolling platform walker;other (see comments)  -ST       Transfer, Comment Pt performs sit to stand with bed elevated using platform walker to stand. Once standing pt instructed to WB through LLE, but RLE knee frantz with continued knee bend on LLE. Pt needs assist of PT/OT to maintain standing. Bed brought to pt to sit. 2nd attempt made with KI on RLE, platform walker and elevated bed surface. Pt able to stand with MAX Ax2, take steps toward chair with cues for upright posture, sequencing and MAX Ax2.  - Tech also assisting with blocking B feet and stabilizing rwx for safefy, assisting w/lines. Cuing for hand placement, WS'ing forward to aid with transfer independence   -ST       Functional Mobility    Functional  Mobility- Ind. Level  maximum assist (25% patient effort);2 person assist required  -       Functional Mobility- Device  rolling platform walker  -       Functional Mobility-Distance (Feet)  8  -       Functional Mobility- Comment  from bed to chair, pt requiring cuing for UB posture and sequencing steps, pt requiring KI on RLE d/t buckling of R knee with inability to keep stabilized during steps; unable to fully extend sx LE d/t weakness and pain, possible RA deformity   -       ADL Assessment/Intervention    Additional Documentation  Self-Feeding Assessment/Training (Group)  -       Lower Body Dressing Assessment/Training    LB Dressing Assess/Train, Clothing Type  doffing:;donning:;slipper socks  -       LB Dressing Assess/Train, Position  sitting  -       LB Dressing Assess/Train, [place]  dependent (less than 25% patient effort)  -       LB Dressing Assess/Train, Impairments  ROM decreased;sensation decreased  -       LB Dressing Assess/Train, Comment  unable to perform d/t dec. hand strength and ROM   -       Self-Feeding Assessment/Training    Self-Feeding Assess/Train, Comment  significant difficulty w/grasping utinsils d/t B swelling in digits along with weakness and dec. ROM; issued built-up utinsils to increase ease with grasp along w/adult sippy cup; these items improved self-feeding tasks, still requires set-up assist with opening containers and packages  -       Motor Skills/Interventions    Additional Documentation  Balance Skills Training (Group);Fine Motor Coordination Training (Group);Gross Motor Coordination Training (Group)  -       Balance Skills Training    Sitting-Level of Assistance  Close supervision  -       Sitting-Balance Support  Feet supported  -       Standing-Level of Assistance  Minimum assistance;x2  -ST       Static Standing Balance Support  assistive device  -       Gait Balance-Level of Assistance  Maximum assistance  -       Gait Balance  Support  assistive device  -       Therapy Exercises    Bilateral Lower Extremities AROM:;10 reps;ankle pumps/circles;quad sets;glut sets;heel slides  -        Bilateral Upper Extremity  --   B yellow foam block power grasp squeezes   -       Gross Motor Coordination Training    Gross Motor Skill, Impairments Detail  rapid/alt: impaired bilaterally   -       Fine Motor Coordination Training    Opposition  Right:;Left:;impaired  -ST       Sensory Assessment/Intervention    Light Touch LLE;RLE  - LUE;RUE  -ST       LUE Light Touch  mild impairment  -ST       RUE Light Touch  mild impairment  -ST       LLE Light Touch WNL  -EH        RLE Light Touch mild impairment  -        General Therapy Interventions    Planned Therapy Interventions  activity intolerance;adaptive equipment training;ADL retraining;IADL retraining;balance training;bed mobility training;fine motor coordination training;home exercise program;ROM (Range of Motion);strengthening;stretching;transfer training  -       Adaptive Equipment Training  issued built up utinsils and adult sippy cup  -       Positioning and Restraints    Pre-Treatment Position in bed  - in bed  -       Post Treatment Position chair  - chair  -       In Chair notified nsg;reclined;call light within reach;encouraged to call for assist;exit alarm on;with OT;waffle cushion  - notified nsg;reclined;call light within reach;encouraged to call for assist;exit alarm on;with OT;waffle cushion  -         10/08/17 7316                General Information    Equipment Currently Used at Home wheelchair;walker, standard  -AD        Living Environment    Lives With parent(s)  -AD        Living Arrangements house  -AD        Home Accessibility no concerns  -AD        Stair Railings at Home none  -AD        Type of Financial/Environmental Concern none  -AD        Transportation Available car;family or friend will provide  -AD        Functional Level Prior    Ambulation  0-->independent  -AD        Transferring 0-->independent  -AD        Toileting 0-->independent  -AD        Bathing 0-->independent  -AD        Dressing 0-->independent  -AD        Eating 0-->independent  -AD        Communication 0-->understands/communicates without difficulty  -AD        Swallowing 0-->swallows foods/liquids without difficulty  -AD        Prior Functional Level Comment independent  -AD          User Key  (r) = Recorded By, (t) = Taken By, (c) = Cosigned By    Initials Name Effective Dates     Dori Mancini, PT 06/19/15 -     ST Whitney Nails, OTR 02/20/17 -     MARIANA Schumacher, LPN 03/14/16 -     AD Nini Ramos, RN 06/21/17 -            Occupational Therapy Education     Title: PT OT SLP Therapies (Active)     Topic: Occupational Therapy (Done)     Point: ADL training (Done)    Description: Instruct learner(s) on proper safety adaptation and remediation techniques during self care or transfers.   Instruct in proper use of assistive devices.    Learning Progress Summary    Learner Readiness Method Response Comment Documented by Status   Patient Acceptance E,TBANDRA DU role of OT, benefits of activity, HEP, AE, bed mobility, transfers ST 10/09/17 1459 Done               Point: Home exercise program (Done)    Description: Instruct learner(s) on appropriate technique for monitoring, assisting and/or progressing therapeutic exercises/activities.    Learning Progress Summary    Learner Readiness Method Response Comment Documented by Status   Patient Acceptance E,TB,ANDRA RDZ,DU role of OT, benefits of activity, HEP, AE, bed mobility, transfers ST 10/09/17 1459 Done               Point: Precautions (Done)    Description: Instruct learner(s) on prescribed precautions during self-care and functional transfers.    Learning Progress Summary    Learner Readiness Method Response Comment Documented by Status   Patient Acceptance E,TB,ANDRA RDZ,DU role of OT, benefits of activity, HEP, AE, bed  mobility, transfers  10/09/17 1459 Done               Point: Body mechanics (Done)    Description: Instruct learner(s) on proper positioning and spine alignment during self-care, functional mobility activities and/or exercises.    Learning Progress Summary    Learner Readiness Method Response Comment Documented by Status   Patient Acceptance E,TB,D VU,DU role of OT, benefits of activity, HEP, AE, bed mobility, transfers  10/09/17 1459 Done                      User Key     Initials Effective Dates Name Provider Type Discipline     02/20/17 -  JELENA Rueda Occupational Therapist OT                  OT Recommendation and Plan  Anticipated Equipment Needs At Discharge: bedside commode (TBD further, has reacher)  Anticipated Discharge Disposition: skilled nursing facility  Planned Therapy Interventions: activity intolerance, adaptive equipment training, ADL retraining, IADL retraining, balance training, bed mobility training, fine motor coordination training, home exercise program, ROM (Range of Motion), strengthening, stretching, transfer training  Therapy Frequency: daily  Plan of Care Review  Plan Of Care Reviewed With: patient  Outcome Summary/Follow up Plan: Pt s/p drainage of L knee and bilateral wrists and now w/limited mobility and ROM impacting ADLs, transfers and mobility. Pt max X2 for transfers/mobility with platform rwx and required use of KI for RLE d/t buckling w/standing. Pt with significantly complicated issues impacting rehab progress this date & would benefit greatly from IP or SNF level rehab at d/c. Pt notes mult. falls at home.           OT Goals       10/09/17 0835          Bed Mobility OT LTG    Bed Mobility OT LTG, Time to Achieve 2 wks  -ST      Bed Mobility OT LTG, Activity Type supine to sit/sit to supine  -ST      Bed Mobility OT LTG, [place] Level contact guard assist  -ST      Bed Mobility OT LTG, Assist Device bed rails  -ST      Bed Mobility OT LTG, Outcome goal  ongoing  -ST      Transfer Training OT LTG    Transfer Training OT LTG, Time to Achieve 2 wks  -ST      Transfer Training OT LTG, Activity Type toilet;sit to stand/stand to sit;bed to chair /chair to bed  -ST      Transfer Training OT LTG, [place] Level minimum assist (75% patient effort);2 person assist required  -ST      Transfer Training OT LTG, Assist Device walker, rolling platform  -ST      Transfer Training OT LTG, Outcome goal ongoing  -ST      Strength OT LTG    Strength Goal OT LTG, Time to Achieve 2 wks  -ST      Strength Goal OT LTG, Functional Goal Pt will be min A with hand strengthening and coordination exercises by d/c to promote increased independence with daily tasks.   -ST      Strength Goal OT LTG, Outcome goal ongoing  -ST      UB Dressing OT LTG    UB Dressing Goal OT LTG, Time to Achieve 2 wks  -ST      UB Dressing Goal OT LTG, [place] Level minimum assist (75% patient effort)  -ST      UB Dressing Goal OT LTG, Outcome goal ongoing  -ST        User Key  (r) = Recorded By, (t) = Taken By, (c) = Cosigned By    Initials Name Provider Type    JELENA Ledesma Occupational Therapist                Outcome Measures       10/09/17 0850 10/09/17 0835       How much help from another person do you currently need...    Turning from your back to your side while in flat bed without using bedrails? 2  -EH      Moving from lying on back to sitting on the side of a flat bed without bedrails? 2  -EH      Moving to and from a bed to a chair (including a wheelchair)? 2  -EH      Standing up from a chair using your arms (e.g., wheelchair, bedside chair)? 2  -EH      Climbing 3-5 steps with a railing? 1  -EH      To walk in hospital room? 1  -EH      AM-PAC 6 Clicks Score 10  -EH      How much help from another is currently needed...    Putting on and taking off regular lower body clothing?  1  -ST     Bathing (including washing, rinsing, and drying)  1  -ST     Toileting (which includes using  toilet bed pan or urinal)  1  -ST     Putting on and taking off regular upper body clothing  1  -ST     Taking care of personal grooming (such as brushing teeth)  2  -ST     Eating meals  3  -ST     Score  9  -ST     Functional Assessment    Outcome Measure Options AM-PAC 6 Clicks Basic Mobility (PT)  - AM-PAC 6 Clicks Daily Activity (OT)  -ST       User Key  (r) = Recorded By, (t) = Taken By, (c) = Cosigned By    Initials Name Provider Type     Dori Mancini, PT Physical Therapist    ST Whitney Nails OTR Occupational Therapist          Time Calculation:   OT Start Time: 0835    Therapy Charges for Today     Code Description Service Date Service Provider Modifiers Qty    66499462388  OT THERAPEUTIC ACT EA 15 MIN 10/9/2017 JELENA Rueda GO 2    [id number]  OT EVAL MOD COMPLEXITY 3 10/9/2017 JELENA Rueda GO 1               JELENA Travis  10/9/2017

## 2017-10-09 NOTE — PLAN OF CARE
Problem: Patient Care Overview (Adult)  Goal: Plan of Care Review  Outcome: Ongoing (interventions implemented as appropriate)    10/09/17 1323   Coping/Psychosocial Response Interventions   Plan Of Care Reviewed With patient   Outcome Evaluation   Outcome Summary/Follow up Plan Pt ambulates to chair with MAX Ax2 +1 for equipment using rolling platform walker 2/2 wrist pain/ bandaging and knee immobilizer on RLE due to RLE buckling when attempting to WB through LLE. Pt limited 2/2 pain and weakness. Pt will need to inpatient rehab upon d/c.         Problem: Inpatient Physical Therapy  Goal: Bed Mobility Goal LTG- PT  Outcome: Ongoing (interventions implemented as appropriate)    10/09/17 1323   Bed Mobility PT LTG   Bed Mobility PT LTG, Date Established 10/09/17   Bed Mobility PT LTG, Time to Achieve 2 wks   Bed Mobility PT LTG, Activity Type supine to sit/sit to supine   Bed Mobility PT LTG, [place] Level conditional independence       Goal: Transfer Training Goal 1 LTG- PT  Outcome: Ongoing (interventions implemented as appropriate)    10/09/17 1323   Transfer Training PT LTG   Transfer Training PT LTG, Date Established 10/09/17   Transfer Training PT LTG, Time to Achieve 2 wks   Transfer Training PT LTG, Activity Type sit to stand/stand to sit   Transfer Training PT LTG, [place] Level minimum assist (75% patient effort)   Transfer Training PT LTG, Assist Device walker, platform       Goal: Gait Training Goal LTG- PT  Outcome: Ongoing (interventions implemented as appropriate)    10/09/17 1323   Gait Training PT LTG   Gait Training Goal PT LTG, Date Established 10/09/17   Gait Training Goal PT LTG, Time to Achieve 2 wks   Gait Training Goal PT LTG, [place] Level moderate assist (50% patient effort)   Gait Training Goal PT LTG, Assist Device walker, rolling platform       Goal: Stair Training Goal LTG- PT  Outcome: Ongoing (interventions implemented as appropriate)    10/09/17 1323   Stair Training  PT LTG   Stair Training Goal PT LTG, Date Established 10/09/17   Stair Training Goal PT LTG, Time to Achieve 2 wks   Stair Training Goal PT LTG, Number of Steps 2   Stair Training Goal PT LTG, [place] Level moderate assist (50% patient effort);2 person assist required   Stair Training Goal PT LTG, Assist Device 2 handrails;walker, platform

## 2017-10-10 ENCOUNTER — APPOINTMENT (OUTPATIENT)
Dept: CT IMAGING | Facility: HOSPITAL | Age: 57
End: 2017-10-10

## 2017-10-10 ENCOUNTER — APPOINTMENT (OUTPATIENT)
Dept: CARDIOLOGY | Facility: HOSPITAL | Age: 57
End: 2017-10-10
Attending: HOSPITALIST

## 2017-10-10 LAB
ANION GAP SERPL CALCULATED.3IONS-SCNC: 5 MMOL/L (ref 3–11)
BH CV LOWER VASCULAR LEFT COMMON FEMORAL AUGMENT: NORMAL
BH CV LOWER VASCULAR LEFT COMMON FEMORAL COMPRESS: NORMAL
BH CV LOWER VASCULAR LEFT COMMON FEMORAL PHASIC: NORMAL
BH CV LOWER VASCULAR LEFT COMMON FEMORAL SPONT: NORMAL
BH CV LOWER VASCULAR LEFT DISTAL FEMORAL COMPRESS: NORMAL
BH CV LOWER VASCULAR LEFT GASTRONEMIUS COMPRESS: NORMAL
BH CV LOWER VASCULAR LEFT GREATER SAPH AK COMPRESS: NORMAL
BH CV LOWER VASCULAR LEFT GREATER SAPH BK COMPRESS: NORMAL
BH CV LOWER VASCULAR LEFT LESSER SAPH COMPRESS: NORMAL
BH CV LOWER VASCULAR LEFT MID FEMORAL AUGMENT: NORMAL
BH CV LOWER VASCULAR LEFT MID FEMORAL COMPRESS: NORMAL
BH CV LOWER VASCULAR LEFT MID FEMORAL PHASIC: NORMAL
BH CV LOWER VASCULAR LEFT MID FEMORAL SPONT: NORMAL
BH CV LOWER VASCULAR LEFT PERONEAL COMPRESS: NORMAL
BH CV LOWER VASCULAR LEFT POPLITEAL AUGMENT: NORMAL
BH CV LOWER VASCULAR LEFT POPLITEAL COMPRESS: NORMAL
BH CV LOWER VASCULAR LEFT POPLITEAL PHASIC: NORMAL
BH CV LOWER VASCULAR LEFT POPLITEAL SPONT: NORMAL
BH CV LOWER VASCULAR LEFT POSTERIOR TIBIAL COMPRESS: NORMAL
BH CV LOWER VASCULAR LEFT PROXIMAL FEMORAL COMPRESS: NORMAL
BH CV LOWER VASCULAR LEFT SAPHENOFEMORAL JUNCTION AUGMENT: NORMAL
BH CV LOWER VASCULAR LEFT SAPHENOFEMORAL JUNCTION COMPRESS: NORMAL
BH CV LOWER VASCULAR LEFT SAPHENOFEMORAL JUNCTION PHASIC: NORMAL
BH CV LOWER VASCULAR LEFT SAPHENOFEMORAL JUNCTION SPONT: NORMAL
BH CV LOWER VASCULAR RIGHT COMMON FEMORAL AUGMENT: NORMAL
BH CV LOWER VASCULAR RIGHT COMMON FEMORAL COMPRESS: NORMAL
BH CV LOWER VASCULAR RIGHT COMMON FEMORAL PHASIC: NORMAL
BH CV LOWER VASCULAR RIGHT COMMON FEMORAL SPONT: NORMAL
BUN BLD-MCNC: 9 MG/DL (ref 9–23)
BUN/CREAT SERPL: 15 (ref 7–25)
CALCIUM SPEC-SCNC: 7.7 MG/DL (ref 8.7–10.4)
CHLORIDE SERPL-SCNC: 114 MMOL/L (ref 99–109)
CO2 SERPL-SCNC: 22 MMOL/L (ref 20–31)
CREAT BLD-MCNC: 0.6 MG/DL (ref 0.6–1.3)
GFR SERPL CREATININE-BSD FRML MDRD: 139 ML/MIN/1.73
GLUCOSE BLD-MCNC: 80 MG/DL (ref 70–100)
POTASSIUM BLD-SCNC: 3.9 MMOL/L (ref 3.5–5.5)
SODIUM BLD-SCNC: 141 MMOL/L (ref 132–146)

## 2017-10-10 PROCEDURE — 0 IOPAMIDOL 61 % SOLUTION: Performed by: HOSPITALIST

## 2017-10-10 PROCEDURE — 99233 SBSQ HOSP IP/OBS HIGH 50: CPT | Performed by: HOSPITALIST

## 2017-10-10 PROCEDURE — 73701 CT LOWER EXTREMITY W/DYE: CPT

## 2017-10-10 PROCEDURE — 93971 EXTREMITY STUDY: CPT | Performed by: INTERNAL MEDICINE

## 2017-10-10 PROCEDURE — 93971 EXTREMITY STUDY: CPT

## 2017-10-10 PROCEDURE — 25010000002 VANCOMYCIN HCL IN NACL 1.5-0.9 GM/500ML-% SOLUTION

## 2017-10-10 PROCEDURE — 80048 BASIC METABOLIC PNL TOTAL CA: CPT | Performed by: ORTHOPAEDIC SURGERY

## 2017-10-10 PROCEDURE — 99024 POSTOP FOLLOW-UP VISIT: CPT | Performed by: ORTHOPAEDIC SURGERY

## 2017-10-10 PROCEDURE — C1751 CATH, INF, PER/CENT/MIDLINE: HCPCS

## 2017-10-10 PROCEDURE — 25010000002 HEPARIN (PORCINE) PER 1000 UNITS: Performed by: HOSPITALIST

## 2017-10-10 PROCEDURE — 25010000002 KETOROLAC TROMETHAMINE PER 15 MG: Performed by: NURSE PRACTITIONER

## 2017-10-10 PROCEDURE — C1894 INTRO/SHEATH, NON-LASER: HCPCS

## 2017-10-10 RX ORDER — SODIUM CHLORIDE 0.9 % (FLUSH) 0.9 %
10 SYRINGE (ML) INJECTION AS NEEDED
Status: DISCONTINUED | OUTPATIENT
Start: 2017-10-10 | End: 2017-10-11 | Stop reason: HOSPADM

## 2017-10-10 RX ORDER — HEPARIN SODIUM 5000 [USP'U]/ML
5000 INJECTION, SOLUTION INTRAVENOUS; SUBCUTANEOUS EVERY 8 HOURS SCHEDULED
Status: DISCONTINUED | OUTPATIENT
Start: 2017-10-10 | End: 2017-10-11 | Stop reason: HOSPADM

## 2017-10-10 RX ADMIN — VANCOMYCIN HCL-SODIUM CHLORIDE IV SOLN 1.5 GM/250ML-0.9% 1500 MG: 1.5-0.9/25 SOLUTION at 13:15

## 2017-10-10 RX ADMIN — KETOROLAC TROMETHAMINE 15 MG: 15 INJECTION, SOLUTION INTRAMUSCULAR; INTRAVENOUS at 18:16

## 2017-10-10 RX ADMIN — OXYCODONE HYDROCHLORIDE 10 MG: 5 TABLET ORAL at 11:30

## 2017-10-10 RX ADMIN — KETOROLAC TROMETHAMINE 15 MG: 15 INJECTION, SOLUTION INTRAMUSCULAR; INTRAVENOUS at 06:22

## 2017-10-10 RX ADMIN — CARVEDILOL 12.5 MG: 12.5 TABLET, FILM COATED ORAL at 08:06

## 2017-10-10 RX ADMIN — KETOROLAC TROMETHAMINE 15 MG: 15 INJECTION, SOLUTION INTRAMUSCULAR; INTRAVENOUS at 12:44

## 2017-10-10 RX ADMIN — FAMOTIDINE 20 MG: 20 TABLET ORAL at 18:19

## 2017-10-10 RX ADMIN — CITALOPRAM HYDROBROMIDE 20 MG: 20 TABLET ORAL at 08:06

## 2017-10-10 RX ADMIN — Medication 5 MG: at 00:33

## 2017-10-10 RX ADMIN — OXYCODONE HYDROCHLORIDE 10 MG: 5 TABLET ORAL at 20:05

## 2017-10-10 RX ADMIN — VANCOMYCIN HCL-SODIUM CHLORIDE IV SOLN 1.5 GM/250ML-0.9% 1500 MG: 1.5-0.9/25 SOLUTION at 02:07

## 2017-10-10 RX ADMIN — OXYCODONE HYDROCHLORIDE 10 MG: 5 TABLET ORAL at 04:23

## 2017-10-10 RX ADMIN — AMITRIPTYLINE HYDROCHLORIDE 75 MG: 50 TABLET, FILM COATED ORAL at 20:05

## 2017-10-10 RX ADMIN — KETOROLAC TROMETHAMINE 15 MG: 15 INJECTION, SOLUTION INTRAMUSCULAR; INTRAVENOUS at 23:01

## 2017-10-10 RX ADMIN — IOPAMIDOL 100 ML: 612 INJECTION, SOLUTION INTRAVENOUS at 18:37

## 2017-10-10 RX ADMIN — NIFEDIPINE 120 MG: 10 CAPSULE ORAL at 08:06

## 2017-10-10 RX ADMIN — HEPARIN SODIUM 5000 UNITS: 5000 INJECTION, SOLUTION INTRAVENOUS; SUBCUTANEOUS at 20:30

## 2017-10-10 RX ADMIN — CARVEDILOL 12.5 MG: 12.5 TABLET, FILM COATED ORAL at 20:05

## 2017-10-10 RX ADMIN — CETIRIZINE HYDROCHLORIDE 10 MG: 10 TABLET, FILM COATED ORAL at 20:05

## 2017-10-10 RX ADMIN — KETOROLAC TROMETHAMINE 15 MG: 15 INJECTION, SOLUTION INTRAMUSCULAR; INTRAVENOUS at 00:29

## 2017-10-10 RX ADMIN — ATORVASTATIN CALCIUM 40 MG: 40 TABLET, FILM COATED ORAL at 20:05

## 2017-10-10 RX ADMIN — DOCUSATE SODIUM 100 MG: 100 CAPSULE, LIQUID FILLED ORAL at 09:00

## 2017-10-10 RX ADMIN — SODIUM CHLORIDE 100 ML/HR: 9 INJECTION, SOLUTION INTRAVENOUS at 04:22

## 2017-10-10 RX ADMIN — ASPIRIN 81 MG 81 MG: 81 TABLET ORAL at 08:06

## 2017-10-10 RX ADMIN — CLOPIDOGREL BISULFATE 75 MG: 75 TABLET ORAL at 08:06

## 2017-10-10 RX ADMIN — PANTOPRAZOLE SODIUM 40 MG: 40 TABLET, DELAYED RELEASE ORAL at 06:22

## 2017-10-10 RX ADMIN — FAMOTIDINE 20 MG: 20 TABLET ORAL at 08:06

## 2017-10-10 RX ADMIN — SODIUM CHLORIDE 100 ML/HR: 9 INJECTION, SOLUTION INTRAVENOUS at 13:14

## 2017-10-10 RX ADMIN — HEPARIN SODIUM 5000 UNITS: 5000 INJECTION, SOLUTION INTRAVENOUS; SUBCUTANEOUS at 16:35

## 2017-10-10 RX ADMIN — COLCHICINE 0.6 MG: 0.6 TABLET, FILM COATED ORAL at 08:06

## 2017-10-10 NOTE — PROGRESS NOTES
"Adult Nutrition  Assessment/PES    Patient Name:  Richard Guevara  YOB: 1960  MRN: [id number]  Admit Date:  10/7/2017    Assessment Date:  10/10/2017          Reason for Assessment       10/10/17 1347    Reason for Assessment    Reason For Assessment/Visit follow up protocol    Time Spent (min) 20    Diagnosis Diagnosis   Per notes this admission               Nutrition/Diet History       10/10/17 1347    Nutrition/Diet History    Reported/Observed By Patient;Family    Appetite Poor    Other Pt reported that he had been eating well until today, pt reported that he has lost his appetite and taste today, attributes the loss to being unable to smoke for a few days. Pt reported that he has been tolerating his diet well, has not been drinking the clear supplements.             Anthropometrics       10/10/17 1351    Anthropometrics    Height 182.9 cm (72\")    Weight 105 kg (231 lb 9.6 oz)    Ideal Body Weight (IBW)    Ideal Body Weight (IBW), Male (kg) 82.07    % Ideal Body Weight 128.28    Body Mass Index (BMI)    BMI (kg/m2) 31.48            Labs/Tests/Procedures/Meds       10/10/17 1351    Labs/Tests/Procedures/Meds    Labs/Tests Review Reviewed                  Evaluation of Received Nutrient/Fluid Intake       10/10/17 1351    PO Evaluation    Number of Meals 5    % PO Intake 75            Problem/Interventions:        Problem 1       10/10/17 1352    Nutrition Diagnoses Problem 1    Problem 1 Inadequate Intake/Infusion    Etiology (related to) Medical Diagnosis   Clinical condition     Signs/Symptoms (evidenced by) Report of Mnimal PO Intake    Reported/Observed By Patient;Family   Pt reported poor PO intake today.                     Intervention Goal       10/10/17 1354    Intervention Goal    General Nutrition support treatment    PO Increase intake            Nutrition Intervention       10/10/17 1354    Nutrition Intervention    RD/Tech Action Advise alternate selection;Advise available snack;Menu " provided;Encourage intake;Adjusted supplement;Follow Tx progress;Care plan reviewd            Nutrition Prescription       10/10/17 1839    Nutrition Prescription PO    PO Prescription Begin/change supplement    Supplement Boost Plus   chocolate    Supplement Frequency Daily    New PO Prescription Ordered? Yes            Education/Evaluation       10/10/17 9209    Monitor/Evaluation    Monitor Per protocol;PO intake;Supplement intake        Electronically signed by:  Sapna Yadav  10/10/17 1:56 PM

## 2017-10-10 NOTE — PROGRESS NOTES
[facility] Medicine Services  INPATIENT PROGRESS NOTE    Date of Admission: 10/7/2017  Length of Stay: 1  Primary Care Physician: Tanner Santiago MD    Subjective-- HPI/Events overnight/ROS/CC- Hospital follow visit.  Detailed ROS not detailed as performed below        Doing better and denies any complaints. Afebrile.      Objective      Temp:  [98.5 °F (36.9 °C)-99.5 °F (37.5 °C)] 98.6 °F (37 °C)  Heart Rate:  [] 97  Resp:  [18-20] 18  BP: (110-136)/(70-86) 110/70    Physical Exam:  Physical Exam    General Assessment: No acute cardiopulmonary distress. Well developed and well nourished.    Neck: Supple    CVS: RRR, S1S2 normal, no murmurs    Resp: CTAB, no adventitious sound    Abd: soft, NT, ND, normal BS, no guarding or peritoneal signs    Ext: LLE larger than RLLE. Both wrist dressing intact with fingers well perfused. Dressing over L knee C/D/I    Neuro: Nonfocal, no facial asymmetry and moving all extremities    Psych: Affect is appropriate          Results Review:    I have reviewed the labs, radiology results and diagnostic studies.      Results from last 7 days  Lab Units 10/09/17  0550  10/08/17  0515   WBC 10*3/mm3  --   --  4.99   HEMOGLOBIN g/dL 12.0*  < > 12.8*   PLATELETS 10*3/mm3  --   --  308   < > = values in this interval not displayed.    Results from last 7 days  Lab Units 10/09/17  0550   SODIUM mmol/L 141   POTASSIUM mmol/L 3.3*   CO2 mmol/L 22.0   CREATININE mg/dL 0.70   GLUCOSE mg/dL 79       Culture Data:  Radiology Data:     I have reviewed the medications.        Assessment/Plan     Assessment  Mr Guevara is a 56 yo M with history of RA, who stopped MTX few weeks ago due to due to URI symptoms, but restarted it back last week, came in with worsening joint pain and swelling despite resuming MTX and increasing dose of steroids.    Problem List    Septic arthritis of bilat wrist and left knee  - s/p aspiration by ortho  - Had been on Zosyn and Vanc, but  ID consulted today due to complex case (immunosuppressed pt), now changed to Vanc and Rocephin  - Blood cultures pending.   - Wound care per ortho (keep dressing intact x 7 days, no shower until at least 10 days)    Gout  - Uric acid 5.5, continue colchicine.  - Will cont to hold Prednisone     RA  - Had been on Prednisone, MTX, and Actermra (all held, not due for methotrexate as just taken a few days ago and just had monthly Actemra last week)     Altered mental status, Probable metabolic encephalopathy  - improved and back to baseline now     Coronary artery disease / hypertension / hyperlipidemia  - Continue aspirin, Plavix, and Statin  - Monitor additional anti-inflammatory/NSAID while on them and consider to d/c. Toradol x5 days for now (will avoid PO since hx reflux/irriation and on PPI plus H2 blockers). Topical NSAID as well to use lower PO dose.  - Continue BB and CCB. Pt thinks hx of an arrhythmia in the past (data deficient).   - Follows outpatient with cardiology.     Elevated liver enzymes:  - Monitor     Exacerbation of weakness and immobility  - PT/OT.  - will benefit from rehab, CM to submit referrals    Dehydration  - s/p IVF     SinusTachycardia:  - likely due to pain, sepsis, and dehydration  - resolved after abx/IVF    LE size discrepancy  -LLE >RLE  -get duplex to R/O DVT     Dispo: SNF. CM made aware of additional DC needs (longterm IV abx/bedside commode for home). Will order PICC. Pt has RIJ tripple lumen catheter at this time.    Complex case with risks for complications.    Loren Martin MD   10/09/17   8:08 PM    Please note that portions of this note may have been completed with a voice recognition program. Efforts were made to edit the dictations, but occasionally words are mistranscribed.

## 2017-10-10 NOTE — PROGRESS NOTES
[facility] Medicine Services  INPATIENT PROGRESS NOTE    Date of Admission: 10/7/2017  Length of Stay: 2  Primary Care Physician: Tanner Santiago MD    Subjective-- HPI/Events overnight/ROS/CC- Hospital follow visit.  Detailed ROS not detailed as performed below      Doing better and reported that pain and swelling has improved. Afebrile.    Objective      Temp:  [98.2 °F (36.8 °C)-98.6 °F (37 °C)] 98.2 °F (36.8 °C)  Heart Rate:  [91-97] 97  Resp:  [18-20] 18  BP: (110-128)/(66-79) 127/75    Physical Exam:  Physical Exam    General Assessment: No acute cardiopulmonary distress. Well developed and well nourished.     Neck: Supple     CVS: RRR, S1S2 normal, no murmurs     Resp: CTAB, no adventitious sound     Abd: soft, NT, ND, normal BS, no guarding or peritoneal signs     Ext: LLE larger than RLE, but has improved. Both wrist dressing intact with fingers well perfused. Dressing over L knee C/D/I     Neuro: Nonfocal, no facial asymmetry and moving all extremities     Psych: Affect is appropriate       Results Review:    I have reviewed the labs, radiology results and diagnostic studies.      Results from last 7 days  Lab Units 10/09/17  0550  10/08/17  0515   WBC 10*3/mm3  --   --  4.99   HEMOGLOBIN g/dL 12.0*  < > 12.8*   PLATELETS 10*3/mm3  --   --  308   < > = values in this interval not displayed.    Results from last 7 days  Lab Units 10/10/17  0424   SODIUM mmol/L 141   POTASSIUM mmol/L 3.9   CO2 mmol/L 22.0   CREATININE mg/dL 0.60   GLUCOSE mg/dL 80       Culture Data:  Radiology Data:     I have reviewed the medications.        Assessment/Plan     Assessment  Mr Guevara is a 58 yo M with history of RA, who stopped MTX few weeks ago due to due to URI symptoms, but restarted it back last week, came in with worsening joint pain and swelling despite resuming MTX and increasing dose of steroids.     Problem List     Septic arthritis of bilat wrist and left knee  - s/p aspiration by  ortho  - Had been on Zosyn and Vanc, but ID consulted today due to complex case (immunosuppressed pt), now changed to Vanc and Rocephin  - Blood cultures neg so far, synovial fluid + S aureus on prelim  - Wound care per ortho (keep dressing intact x 7 days, no shower until at least 10 days)  - will need 6 weeks of Rocephin 2 gram daily, PICC ordered     Gout  - Uric acid 5.5, continue colchicine.  - Will cont to hold Prednisone      RA  - Had been on Prednisone, MTX, and Actermra (all held, not due for methotrexate as just taken a few days ago and just had monthly Actemra last week)     Altered mental status, Probable metabolic encephalopathy  - improved and back to baseline now      Coronary artery disease / hypertension / hyperlipidemia  - Continue aspirin, Plavix, and Statin  - Monitor additional anti-inflammatory/NSAID while on them and consider to d/c. Toradol x5 days for now (will avoid PO since hx reflux/irriation and on PPI plus H2 blockers). Topical NSAID as well to use lower PO dose.  - Continue BB and CCB. Pt thinks hx of an arrhythmia in the past (data deficient).   - Follows outpatient with cardiology.      Elevated liver enzymes:  - Monitor     Exacerbation of weakness and immobility  - PT/OT.  - will benefit from rehab, CM to submit referrals     Dehydration  - s/p IVF     SinusTachycardia:  - likely due to pain, sepsis, and dehydration  - resolved after abx/IVF     LE size discrepancy  -LLE >RLE  -duplex LLE complete, premlim neg for DVT, but found incidental mass on anterlat aspect of leg just above the knee.    LLE mass  - as above, will get CT scan with and without IV contrast for further evaluation        Dispo: SNF. CM made aware of additional DC needs (longterm IV abx/bedside commode for home). CM to submit referral for rehab today     Complex case with risks for complications. Case discussed with pt/his dtr and nursing staff/CM.       Loren Martin MD   10/10/17   2:15 PM    Please  note that portions of this note may have been completed with a voice recognition program. Efforts were made to edit the dictations, but occasionally words are mistranscribed.

## 2017-10-10 NOTE — DISCHARGE PLACEMENT REQUEST
"Richard Mcadams (57 y.o. Male)   To SN[facility]  From Wendie Ocampo [phone]    Date of Birth Social Security Number Address Home Phone MRN    1960  [address] [facility] [address] [phone] [id number]    Confucianist Marital Status          None        Admission Date Admission Type Admitting Provider Attending Provider Department, Room/Bed    10/7/17 Emergency Loren Martin MD Khamvanthong, Phonekeo, MD [facility] 5G, S548/1    Discharge Date Discharge Disposition Discharge Destination                      Attending Provider: Loren Martin MD     Allergies:  No Known Allergies    Isolation:  None   Infection:  None   Code Status:  FULL    Ht:  72\" (182.9 cm)   Wt:  231 lb 9.6 oz (105 kg)    Admission Cmt:  None   Principal Problem:  Pyogenic arthritis of multiple sites [M00.9] More...                 Active Insurance as of 10/7/2017     Primary Coverage     Payor Plan Insurance Group Employer/Plan Group    MEDICARE MEDICARE A & B      Payor Plan Address Payor Plan Phone Number Effective From Effective To    [address] [phone] 6/1/2004     [place] [address]       Subscriber Name Subscriber Birth Date Member ID       RICHARD MCADAMS 1960 [id number]                 Emergency Contacts      (Rel.) Home Phone Work Phone Mobile Phone    Darrius Mcadams (Son) [phone] -- --               History & Physical      Eleno Rea MD at 10/8/2017  1:24 AM              [facility]  HISTORY AND PHYSICAL    Primary Care Physician: Tanner Santiago MD   Rheumatology: Dr. Suraj Berg  Cardiology: Dr. Josey Nicole    Subjective     Chief Complaint: painful red swollen joints    History of Present Illness:     Mr. Mcadams presented to [facility] 10/7 evening for painful red swollen joints. Bilateral wrists and left knee. Onset \"a few weeks ago.\" Gradual. Started more mild and now progressed to moderate. Constant. He " "reports his baseline chronic pain level is 7/10, and today is 8/10 - he felt it got worse being off his autoimmune medications for a few weeks, but is improving some initially since recently restarted them. He feels left wrist is worse r/t acute worse mobility and leaning on his walker on that location when getting up. No improvement with icing the areas. He has a history of advanced rheumatoid arthritis and has been disabled since age 30s r/t it. He is on methotrexate, actemra, prednisone (was just increased from 5 mg daily to 10 mg daily last week), and salsalate, and also takes tramadol for pain.    Richard states his decline all started 6 weeks ago \"when I got the flu.\" He did not see any provider or be tested for it, but he says he is certain he had it because of how bad he felt with URI complaints and everything hurting and feeling miserable. He states since he was sick, he stopped taking his weekly methotrexate for a few weeks. He recently started it back 2 doses/weeks ago, and restarted his monthly actemra 10/2.    Mr. Ismael rodgers contacted his rheumatologist for him (r/t associated acute disorientation) and they were advised to go to the ED for further evaluation.    He is being admitted to [facility] for further evaluation.    Review of Systems   Constitutional: Positive for activity change, appetite change, chills, diaphoresis and fatigue. Negative for fever.        Cold sweats in last few weeks with activity. Lost estimated 20 lbs in last 5-6 weeks.   HENT: Negative for congestion, dental problem, ear pain, rhinorrhea, sinus pain, sinus pressure, sore throat and trouble swallowing.         Reports 6 weeks ago did have runny/stuffy nose, sore throat, cough, severe myalgias, feeling like he was dying.   Respiratory: Negative for apnea, cough, chest tightness and shortness of breath.         Reports wheezing with recent URI, resolved. Denies orthopnea. No DONNELLY.   Cardiovascular: Positive for palpitations. Negative " for chest pain and leg swelling.        Occ palpitations with activity.   Gastrointestinal: Positive for vomiting. Negative for abdominal distention, abdominal pain, blood in stool, constipation, diarrhea and nausea.        One vomiting episode today when taking medicine.   Genitourinary: Negative for decreased urine volume, difficulty urinating, dysuria, hematuria, penile swelling and scrotal swelling.        Denies dark/odorous urine.   Musculoskeletal: Positive for arthralgias and joint swelling.        Myalgias resolved. Increased arthralgias. Hx back discomfort at baseline. Last weeks worsening gait, and increased immobility.   Skin: Positive for color change. Negative for rash and wound.   Allergic/Immunologic: Positive for immunocompromised state.   Neurological: Positive for weakness. Negative for syncope.   Hematological: Negative for adenopathy. Bruises/bleeds easily.   Psychiatric/Behavioral: Negative for agitation, behavioral problems and hallucinations.        Acute forgetting the last few weeks, the last few days forgetting short term events and maybe a few episodes os disorientation. No otherwise confusion.      Otherwise complete 10 system ROS performed and negative except as mentioned in the HPI.    Past Medical History:   Diagnosis Date   • Altered mental status 10/8/2017   • CAD (coronary artery disease) 10/8/2017   • Erythematous condition 10/8/2017   • Gout 10/8/2017   • HLD (hyperlipidemia) 10/8/2017   • Hyperlipidemia    • Hypertension    • Immobility 10/8/2017   • Joint erythema 10/8/2017   • Joint pain 10/8/2017   • RA (rheumatoid arthritis)    • Rheumatoid arthritis 10/8/2017   • Weakness 10/8/2017       Past Surgical History:   Procedure Laterality Date   • CORONARY ANGIOPLASTY WITH STENT PLACEMENT      x1, around 2007 edith   • SKIN BIOPSY      hx a few suspicious spots removed, only required removal, unsure of result       Family History   Problem Relation Age of Onset   • Osteoporosis  Mother    • COPD Father    • Rheum arthritis Father    • Arthritis Sister    • Osteoporosis Sister        Social History     Social History   • Marital status:      Spouse name: N/A   • Number of children: N/A   • Years of education: N/A     Occupational History   • Not on file.     Social History Main Topics   • Smoking status: Current Every Day Smoker     Packs/day: 1.00   • Smokeless tobacco: Never Used   • Alcohol use No   • Drug use: No   • Sexual activity: Defer     Other Topics Concern   • Not on file     Social History Narrative    Mr. Guevara is a 57 year old white  male. He has 2 children. He has been disabled from a younger age r/t advanced rheumatoid arthritis. He does not have an advanced directive.       Medications:     amitriptyline (ELAVIL) 75 MG tablet Take  by mouth Every Night. Mickey Kerr MD Needs Review       aspirin 81 MG tablet Take  by mouth Daily. Mickey Kerr MD Needs Review       atorvastatin (LIPITOR) 40 MG tablet Take 40 mg by mouth Daily. Mickey Kerr MD Needs Review       carvedilol CR (COREG CR) 40 MG 24 hr capsule Take 40 mg by mouth Daily. Mickey Kerr MD Needs Review       citalopram (CeleXA) 20 MG tablet Take 20 mg by mouth Daily. Mickey Kerr MD Needs Review       clopidogrel (PLAVIX) 75 MG tablet Take 75 mg by mouth Daily. Mickey Kerr MD Needs Review       colchicine 0.6 MG tablet Take 0.6 mg by mouth Daily. Mickey Kerr MD Needs Review       diltiaZEM CD (CARTIA XT) 120 MG 24 hr capsule Take 120 mg by mouth Daily. Mickey Kerr MD Needs Review       docusate sodium (COLACE) 100 MG capsule Take 1 tablet by mouth Daily. Mickey Kerr MD Needs Review       esomeprazole (nexIUM) 40 MG capsule Take 40 mg by mouth Every Morning Before Breakfast. Mickey Kerr MD Needs Review       folic acid-vit B6-vit B12 (FOLGARD) 2.2-25-1 MG tablet tablet Take  by mouth Daily. Mickey Kerr MD  "Needs Review       loratadine (CLARITIN) 10 MG tablet Take 10 mg by mouth Daily. Historical Provider, MD Needs Review       predniSONE (DELTASONE) 5 MG tablet Take 5 mg by mouth Daily. Historical Provider, MD Needs Review       raNITIdine (ZANTAC) 150 MG tablet Take 150 mg by mouth 2 (Two) Times a Day. Historical Provider, MD Needs Review       salsalate (DISALCID) 750 MG tablet Take 750 mg by mouth 2 (Two) Times a Day. Historical Provider, MD Needs Review       traMADol (ULTRAM) 50 MG tablet Take 50 mg by mouth Every 6 (Six) Hours As Needed for Moderate Pain . Historical Provider, MD Needs Review      Methotrexate 1 cc inj weekly  Acterma 1 inj monthly    Allergies:  No Known Allergies      Objective     Physical Exam:  Vital Signs: /96  Pulse 113  Temp 98.6 °F (37 °C)  Resp 16  Ht 71\" (180.3 cm)  Wt 210 lb (95.3 kg)  SpO2 94%  BMI 29.29 kg/m2  Physical Exam   Constitutional: He is oriented to person, place, and time. He appears well-developed and well-nourished. No distress.   HENT:   Head: Normocephalic and atraumatic.   Mouth/Throat: Oropharynx is clear and moist. No oropharyngeal exudate.   Dentures. MM pink/dry.   Eyes: Conjunctivae and EOM are normal. Pupils are equal, round, and reactive to light. Right eye exhibits no discharge. Left eye exhibits no discharge. No scleral icterus.   Neck: No JVD present. No tracheal deviation present.   Cardiovascular: Regular rhythm, normal heart sounds and intact distal pulses.  Tachycardia present.    No murmur heard.  Pulses:       Radial pulses are 2+ on the right side, and 2+ on the left side.        Dorsalis pedis pulses are 2+ on the right side, and 2+ on the left side.   Radials palpable pressing through edema. Cap refill <3 seconds.   Pulmonary/Chest: Effort normal and breath sounds normal. No respiratory distress. He has no wheezes. He has no rales. He exhibits no tenderness.   Abdominal: Soft. Bowel sounds are normal. He exhibits no distension. There " is no tenderness. There is no guarding.   Genitourinary:   Genitourinary Comments: Bladder non-distended, non-tender.   Musculoskeletal: He exhibits edema and tenderness.   Reduced/painful ROM to bilateral wrists (L>R) and left knee.   Neurological: He is alert and oriented to person, place, and time. No cranial nerve deficit.   Skin: Skin is warm and dry. No rash noted. He is not diaphoretic.   Left wrist greater erythema, hot, edema. Right wrist erythema, warmer, edema. Left knee edema, not hot or red. Bilateral hands moderate edema, L>R, and in fingers, above wrists no further edema, no other edema on body.   Psychiatric: He has a normal mood and affect. His behavior is normal. Judgment and thought content normal.   Calm, relaxed, cooperative, engages, pleasant. A&O x4, does not recall a few of the events his family reports occurred when he was acting off the last few days.       Results Reviewed:    Lab Results (last 24 hours)     Procedure Component Value Units Date/Time    CBC & Differential [[id number]] Collected:  10/07/17 2103    Specimen:  Blood Updated:  10/07/17 2114    Narrative:       The following orders were created for panel order CBC & Differential.  Procedure                               Abnormality         Status                     ---------                               -----------         ------                     CBC Auto Differential[[id number]]        Abnormal            Final result                 Please view results for these tests on the individual orders.    Comprehensive Metabolic Panel [[id number]]  (Abnormal) Collected:  10/07/17 2103    Specimen:  Blood Updated:  10/07/17 2130     Glucose 137 (H) mg/dL      BUN 14 mg/dL      Creatinine 0.70 mg/dL      Sodium 137 mmol/L      Potassium 3.4 (L) mmol/L      Chloride 106 mmol/L      CO2 24.0 mmol/L      Calcium 9.6 mg/dL      Total Protein 7.0 g/dL      Albumin 3.50 g/dL      ALT (SGPT) 100 (H) U/L      AST (SGOT) 42 (H) U/L       Alkaline Phosphatase 115 (H) U/L      Total Bilirubin 0.4 mg/dL      eGFR Non African Amer 117 mL/min/1.73      Globulin 3.5 gm/dL      A/G Ratio 1.0 (L) g/dL      BUN/Creatinine Ratio 20.0     Anion Gap 7.0 mmol/L     Narrative:       [employer] Guidelines    Stage     Description        GFR  1         Normal or High     90+  2         Mild decrease      60-89  3         Moderate decrease  30-59  4         Severe decrease    15-29  5         Kidney failure     <15    aPTT [[id number]]  (Normal) Collected:  10/07/17 2103    Specimen:  Blood Updated:  10/07/17 2127     PTT 26.1 seconds     Narrative:       PTT = The equivalent PTT values for the therapeutic range of heparin levels at 0.3 to 0.5 U/ml are 45 to 60 seconds.    Protime-INR [[id number]]  (Abnormal) Collected:  10/07/17 2103    Specimen:  Blood Updated:  10/07/17 2127     Protime 12.0 (H) Seconds      INR 1.10    Narrative:       Therapeutic Ranges for INR: 2.0-3.0 (PT 20-30)                              2.5-3.5 (PT 25-34)    Uric Acid [[id number]]  (Normal) Collected:  10/07/17 2103    Specimen:  Blood Updated:  10/07/17 2130     Uric Acid 5.5 mg/dL       Falsely depressed results may occur on samples drawn from patients receiving N-Acetylcysteine (NAC) or Metamizole.       Sedimentation Rate [[id number]]  (Abnormal) Collected:  10/07/17 2103    Specimen:  Blood Updated:  10/07/17 2118     Sed Rate 95 (H) mm/hr     CBC Auto Differential [[id number]]  (Abnormal) Collected:  10/07/17 2103    Specimen:  Blood Updated:  10/07/17 2114     WBC 4.94 10*3/mm3      RBC 4.31 10*6/mm3      Hemoglobin 14.9 g/dL      Hematocrit 43.3 %      .5 (H) fL      MCH 34.6 (H) pg      MCHC 34.4 g/dL      RDW 13.9 %      RDW-SD 50.6 fl      MPV 9.4 fL      Platelets 347 10*3/mm3      Neutrophil % 51.8 %      Lymphocyte % 30.8 %      Monocyte % 14.4 (H) %      Eosinophil % 1.8 %      Basophil % 0.6 %      Immature Grans % 0.6 %      Neutrophils, Absolute 2.56  10*3/mm3      Lymphocytes, Absolute 1.52 10*3/mm3      Monocytes, Absolute 0.71 10*3/mm3      Eosinophils, Absolute 0.09 10*3/mm3      Basophils, Absolute 0.03 10*3/mm3      Immature Grans, Absolute 0.03 10*3/mm3      nRBC 0.0 /100 WBC     POC Troponin, Rapid [[id number]]  (Normal) Collected:  10/07/17 2115    Specimen:  Blood Updated:  10/07/17 2130     Troponin I 0.03 ng/mL       Serial Number: [id number]:  [id number]       Lactic Acid, Plasma [[id number]]  (Normal) Collected:  10/07/17 2133    Specimen:  Blood Updated:  10/07/17 2147     Lactate 1.5 mmol/L       Falsely depressed results may occur on samples drawn from patients receiving N-Acetylcysteine (NAC) or Metamizole.       Procalcitonin [[id number]] Collected:  10/07/17 2133    Specimen:  Blood Updated:  10/07/17 2211     Procalcitonin 0.16 ng/mL     Narrative:       As a Marker for Sepsis (Non-Neonates):   1. <0.5 ng/mL represents a low risk of severe sepsis and/or septic shock.  2. >2 ng/mL represents a high risk of severe sepsis and/or septic shock.    As a Marker for Lower Respiratory Tract Infections that require antibiotic therapy:    PCT on Admission     Antibiotic Therapy       6-12 Hrs later  > 0.5                Strongly Recommended             >0.25 - <0.5         Recommended  0.1 - 0.25           Discouraged              Remeasure/reassess PCT  <0.1                 Strongly Discouraged     Remeasure/reassess PCT                     PCT values of < 0.5 ng/mL do not exclude an infection, because localized infections (without systemic signs) may be associated with such low concentrations, or a systemic infection in its initial stages (< 6 hours). Furthermore, increased PCT can occur without infection. PCT concentrations between 0.5 and 2.0 ng/mL should be interpreted taking into account the patient's history. It is recommended to retest PCT within 6-24 hours if any concentrations < 2 ng/mL are obtained.    C-reactive Protein [[id number]]   (Abnormal) Collected:  10/07/17 2133    Specimen:  Blood Updated:  10/07/17 2148     C-Reactive Protein 9.22 (H) mg/dL     Blood Culture - Blood, [[id number]] Collected:  10/07/17 2141    Specimen:  Blood from Arm, Right Updated:  10/07/17 2153    Blood Culture - Blood, [[id number]] Collected:  10/07/17 2141    Specimen:  Blood from Arm, Right Updated:  10/07/17 2153    Crystal Exam, Fluid - Synovial Fluid, Wrist, Left [[id number]] Collected:  10/07/17 2347    Specimen:  Synovial Fluid from Wrist, Left Updated:  10/08/17 0216     Crystals, Fluid No crystals seen    Body Fluid Cell Count With Differential - Synovial Fluid, Wrist, Left [[id number]] Collected:  10/07/17 2347    Specimen:  Synovial Fluid from Wrist, Left Updated:  10/08/17 0331    Narrative:       The following orders were created for panel order Body Fluid Cell Count With Differential - Synovial Fluid, Wrist, Left.  Procedure                               Abnormality         Status                     ---------                               -----------         ------                     Body fluid cell count - ...[[id number]]  Abnormal            Edited Result - FINAL      Body fluid differential ...[[id number]]                      Edited Result - FINAL        Please view results for these tests on the individual orders.    Body fluid cell count - Body Fluid, [[id number]]  (Abnormal) Collected:  10/07/17 2347    Specimen:  Synovial Fluid from Wrist, Left Updated:  10/08/17 0243     Color, Fluid Brown     Appearance, Fluid Turbid (A)     WBC, Fluid 174.200 /mm3       Corrected result. Previous result was 174 /mm3 on 10/8/2017 at 0212 EDT        RBC, Fluid 84912 /mm3     Body Fluid Cell Count With Differential - Synovial Fluid, Knee, Left [[id number]] Collected:  10/07/17 2347    Specimen:  Synovial Fluid from Knee, Left Updated:  10/08/17 0332    Narrative:       The following orders were created for panel order Body Fluid Cell Count With Differential -  Synovial Fluid, Knee, Left.  Procedure                               Abnormality         Status                     ---------                               -----------         ------                     Body fluid cell count - ...[[id number]]  Abnormal            Edited Result - FINAL      Body fluid differential ...[[id number]]                      Edited Result - FINAL        Please view results for these tests on the individual orders.    Crystal Exam, Fluid - Synovial Fluid, Knee, Left [[id number]] Collected:  10/07/17 2347    Specimen:  Synovial Fluid from Knee, Left Updated:  10/08/17 0059     Crystals, Fluid No crystals seen    Body fluid cell count - Body Fluid, [[id number]]  (Abnormal) Collected:  10/07/17 2347    Specimen:  Synovial Fluid from Knee, Left Updated:  10/08/17 0244     Color, Fluid Yellow     Appearance, Fluid Turbid (A)     WBC, Fluid 201.000 /mm3      RBC, Fluid 36023 /mm3     Body fluid differential - Body Fluid, [[id number]] Collected:  10/07/17 2347    Specimen:  Synovial Fluid from Knee, Left Updated:  10/08/17 0332     Neutrophils, Fluid 94 %       Appended report.  These results have been appended to a previously final verified report.        Lymphocytes, Fluid 1 %       Appended report.  These results have been appended to a previously final verified report.        Monocytes, Fluid 5 %       Appended report.  These results have been appended to a previously final verified report.       Body fluid differential - Body Fluid, [[id number]] Collected:  10/07/17 2347    Specimen:  Synovial Fluid from Wrist, Left Updated:  10/08/17 0331     Neutrophils, Fluid 95 %       Appended report.  These results have been appended to a previously final verified report.        Lymphocytes, Fluid 1 %       Appended report.  These results have been appended to a previously final verified report.        Monocytes, Fluid 3 %       Appended report.  These results have been appended to a previously final verified  report.        Mesothelial Cells, Fluid 1 %       Appended report.  These results have been appended to a previously final verified report.       Body Fluid Cell Count With Differential - Synovial Fluid, Wrist, Right [[id number]] Collected:  10/07/17 2348    Specimen:  Synovial Fluid from Wrist, Right Updated:  10/08/17 0335    Narrative:       The following orders were created for panel order Body Fluid Cell Count With Differential - Synovial Fluid, Wrist, Right.  Procedure                               Abnormality         Status                     ---------                               -----------         ------                     Body fluid cell count - ...[[id number]]  Abnormal            Final result               Body fluid differential ...[[id number]]                      Final result                 Please view results for these tests on the individual orders.    Crystal Exam, Fluid - Synovial Fluid, Wrist, Right [[id number]] Collected:  10/07/17 2348    Specimen:  Synovial Fluid from Wrist, Right Updated:  10/08/17 0242     Crystals, Fluid No crystals seen    Body fluid cell count - Body Fluid, [[id number]]  (Abnormal) Collected:  10/07/17 2348    Specimen:  Synovial Fluid from Wrist, Right Updated:  10/08/17 0242     Color, Fluid Red     Appearance, Fluid Turbid (A)     WBC, Fluid 137.000 /mm3       Specimen clotted        RBC, Fluid 239200 /mm3       Specimen clotted       Body fluid differential - Body Fluid, [[id number]] Collected:  10/07/17 2348    Specimen:  Synovial Fluid from Wrist, Right Updated:  10/08/17 0335     Neutrophils, Fluid 95 %      Lymphocytes, Fluid 1 %      Monocytes, Fluid 3 %      Macrophage, Fluid 1 %     Body Fluid Culture - Synovial Fluid, Wrist, Left [[id number]] Collected:  10/08/17 0100    Specimen:  Synovial Fluid from Wrist, Left Updated:  10/08/17 0305     Gram Stain Result Many (4+) WBCs seen      No organisms seen    Body Fluid Culture - Synovial Fluid, Knee, Left [[id number]]  Collected:  10/08/17 0130    Specimen:  Synovial Fluid from Knee, Left Updated:  10/08/17 0159    Body Fluid Culture - Synovial Fluid, Wrist, Right [[id number]] Collected:  10/08/17 0140    Specimen:  Synovial Fluid from Wrist, Right Updated:  10/08/17 0305     Gram Stain Result Many (4+) WBCs seen      No organisms seen        ECG  Vent. rate 114 BPM  DC interval 140 ms  QRS duration 74 ms  QT/QTc 310/427 ms  P-R-T axes 22 33 53  Sinus tachycardia  Otherwise normal ECG  No previous ECGs available    XR Chest, 1 View     CLINICAL HISTORY:    57 years old, male; Pain; Chest pain; Type not specified; Additional info:   Redness and swelling of left forearm, Tachycardia, rheumatoid arthritis     TECHNIQUE:    Frontal view of the chest.     COMPARISON:    [facility] 6/4/2014 12:51:11 PM     FINDINGS:    Lungs:  Unremarkable.  No consolidation.    Pleural space:  Unremarkable.  No pneumothorax.    Heart:  Unremarkable.  No cardiomegaly.    Mediastinum:  Unremarkable.    Bones/joints:  Old healed right posterior rib fractures.     IMPRESSION:    No acute abnormality.    XR Left Wrist Complete, 3 or More Views     CLINICAL HISTORY:    57 years old, male; Pain; Wrist; Left; Additional info: Ra with swelling and   pain     TECHNIQUE:    Frontal, lateral and oblique views of the left wrist.     COMPARISON:    No relevant prior studies available.     FINDINGS:    Bones/joints: No fracture. Severe degenerative changes consistent with   rheumatoid arthritis (worse on the left wrist) with mild palmar subluxation of   the carpal bones in relation to the radius and ulna.  Carpal joint space   narrowing.  Severe radiocarpal joint space narrowing.  Second and third   metacarpophalangeal periarticular lucencies.  No acute fracture.    Soft tissues: Soft tissue swelling of the wrist.  No radiopaque foreign body.     IMPRESSION:    Severe degenerative changes consistent with rheumatoid arthritis (worse on   the left  wrist) with mild palmar subluxation of the carpal bones in relation to   the radius and ulna.    XR Right Wrist Complete, 3 or More Views     CLINICAL HISTORY:    57 years old, male; Pain; Wrist; Right; Additional info: Ra with swelling and   pain     TECHNIQUE:    Frontal, lateral and oblique views of the right wrist.     COMPARISON:    Left wrist radiograph same date     FINDINGS:    Bones/joints:  No acute fracture.  Radiocarpal joint space narrowing.    Ulnocarpal joint space narrowing.  Diffuse carpal joint space narrowing.    Carpal periarticular lucencies consistent with rheumatoid arthritis.  Fifth   carpometacarpal periarticular lucencies.  First and fifth metacarpophalangeal   periarticular lucencies.  No dislocation.    Soft tissues:  Unremarkable.  No radiopaque foreign body.     IMPRESSION:  1.  No acute fracture.  2.  Degenerative changes of the right wrist consistent with rheumatoid   Arthritis.    Addendum      MD Paula Torres Oct 8, 2017 12:49:05 AM EDT         IMPRESSION:       1.  No acute fracture.  2.  Degenerative changes of the right wrist consistent with rheumatoid   arthritis.      XR Left Knee, 1 or 2 views     CLINICAL HISTORY:    57 years old, male; Pain; Knee; Left; Additional info: Ra with swelling and   pain     TECHNIQUE:    Frontal and/or lateral views of the left knee.     COMPARISON:    No relevant prior studies available.     FINDINGS:    Bones/joints:  Severe tricompartmental joint space narrowing, worst   laterally.  No acute fracture.  No dislocation.    Soft tissues:  Soft tissue swelling at the knee.  Small suprapatellar   effusion.     IMPRESSION:  1.  Severe tricompartmental joint space narrowing, worst laterally.  2.  Soft tissue swelling at the knee.  Small suprapatellar effusion.    I have personally reviewed and interpreted available lab data, radiology studies and ECG obtained at time of admission.     Assessment / Plan     Problem List:   Hospital Problem List      * (Principal)Acute joint pain    Rheumatoid arthritis (Chronic)    Gout (Chronic)    Hypertension (Chronic)    CAD (coronary artery disease) (Chronic)    HLD (hyperlipidemia) (Chronic)    Joint erythema    Erythematous condition    Altered mental status    Immobility    Weakness          Assessment / Plan:    1. Acute exacerbation of joint pain / joint edema / joint erythema / chronic rheumatoid arthritis:  - RA flare vs septic arthritis?  - Await joint aspiration diagnostics done by ortho overnight to bilateral wrists and left knee.  - Continue zosyn and vanc for now while pending; consider cellulitis. Defer duplexes as localized over joints.  - Blood cultures pending. Tachycardia 110-120s but otherwise VSS. Lactic WNL.  - Uric acid 5.5, continue colchicine.  - Ortho recs are concern for infection will likely OR for affected joints, but if not concerning for infection to provide symptomatic treatment with anti-inflammatories and autoimmune medications.  - On aspirin and plavix for #3. Will add lower dosed NSAID, IV toradol r/t GI, x5 days, and use topical PRN as well. Continue home tramadol PRN. Hx hydrocodone in the past ineffective - will use oxy IR PRN for uncontrolled pain and increase as needed. Renal WNL.  - Will defer prednisone until studies back. NSAID as above. Not due for methotrexate as just taken a few days ago, just had monthly actemra last week - can await final cultures before ordering further doses.  - Await preliminary studies before ordering a diet.    2. Altered mental status, Probable metabolic encephalopathy /pain related :  - Unclear etiology of the prior disorientation spells.  - A&O at this time.  - Defer head imaging at this time.    3. Coronary artery disease / hypertension / hyperlipidemia:  - Continue aspirin and plavix.  - Monitor additional anti-inflammatory/NSAID while on them and consider to d/c. Toradol x5 days for now (will avoid PO since hx reflux/irriation and on PPI plus H2  blockers). Topical NSAID as well to use lower PO dose.  - Continue BB and CCB. Pt thinks hx of an unknown arrhythmia in the past. Follows outpatient with cardiology. ST today.  - Statin - on colchicine for gout. Mildly elevated liver enzymes. Low dose tylenol PRN. Monitor.    4. Elevated liver enzymes:  - As above.  - Consider further workup.    5. Exacerbation of weakness and immobility:  - PT/OT.  - Replace electrolytes as needed.  - IV hydration for clinical dehydration.    6. Tachycardia:  - IV fluids. Abx. As above.      DVT prophylaxis: Teds/scuds. Will defer other pharmacological for now as on aspirin and plavix, and adding additional NSAID for treatment above. Consider heparin SQ.    Code Status: Full code / full support. No advanced directive. Desires no restrictions. Adult children as next of kin, are present per pt request for visit.    Admission Status: Patient will be admitted to INPATIENT status due to the need for care which can only be reasonably provided in an hospital setting such as aggressive/expedited ancillary services and/or consultation services, the necessity for IV medications, close physician monitoring and/or the possible need for procedures.  In such, I feel patient’s risk for adverse outcomes and need for care warrant INPATIENT evaluation and predict the patient’s care encounter to likely last beyond 2 midnights.     Josey Fritz, ZAC 10/08/17 4:11 AM      Brief Attending Admission Attestation     I have seen and examined the patient, performing an independent face-to-face diagnostic evaluation with plan of care reviewed and developed with the advanced practice clinician (APC).      Brief Summary Statement/HPI:    57-year-old Mr. Guevara with history of rheumatoid arthritis who stopped his methotrexate 3 weeks ago when he had's flulike symptoms is coming to [facility] secondary to painful joints with swelling and erythema, bilateral wrist as well as left knee, with  progressively worsening over last many days to the level that he is not able to ambulate, pain very within 7-8 x 10 on the pain scale, he also takes prednisone 5 mg every day.  In the emergency room patient was afebrile, normocytosis but with concern with warmth tenderness and swollen joints patient was seen by orthopedics and underwent arthrocentesis and started on Zosyn and vancomycin in the emergency room.  Patient denies any fever chills or rigors.  Patient denies any sick contacts.  Complains of weight loss, myalgia      Attending Physical Exam:  Constitutional: No acute distress, awake, alert sitting in bed comfortable  Eyes: PERRLA, sclerae anicteric, no conjunctival injection  HENT: NCAT, mucous membranes moist  Neck: Supple, no thyromegaly, no lymphadenopathy, trachea midline  Respiratory: Clear to auscultation bilaterally, nonlabored respirations   Cardiovascular: RRR, no murmurs, rubs, or gallops, palpable pedal pulses bilaterally  Gastrointestinal: Positive bowel sounds, soft, nontender, nondistended  Musculoskeletal: Bilateral wrist with mild erythema significant swelling and decreased range of motion along with the left knee with similar finding with warmth and mild erythema and mild tenderness post arthrocentesis with bandage in place.  Positive restriction of movement on the left knee with limited extension secondary to pain  Psychiatric: Oriented x 3, appropriate affect, cooperative  Neurologic: Strength symmetric in all extremities, Cranial Nerves grossly intact to confrontation, speech clear  Skin: No rashes      Brief Assessment/Plan :  See above for further detailed assessment and plan developed with APC which I have reviewed and  edited.  Continue Zosyn, vancomycin, I discussed with Dr. Ortiz orthopedics follow labs cultures, morning T May need to discuss with the rheumatologist Dr. Berg and possible consultation with rheumatology on Monday.  If there is no signs of or evidence of infection  then consider higher steroid dose.  I believe this patient meets Patient will be admitted to INPATIENT status due to the need which can only be reasonably provided in an hospital setting such as aggressive/expedited ancillary services and/or consultation services and the necessity for IV medications, close physician monitoring and/or the possible need for procedures.  In such, I feel patient’s risk for adverse outcomes and need for care warrant INPATIENT evaluation and predict the patient’s care encounter to likely last beyond 2 midnights.    Eleno Rea MD  10/08/17  5:07 AM          Electronically signed by Eleno Rea MD at 10/8/2017  5:12 AM        Hospital Medications (active)       Dose Frequency Start End    ! Vancomycin trough 10/11/17 at 1330  Once 10/11/2017     Sig - Route: 1 (One) Time. - Does not apply    acetaminophen (TYLENOL) tablet 500 mg 500 mg 3 Times Daily PRN 10/8/2017     Sig - Route: Take 1 tablet by mouth 3 (Three) Times a Day As Needed for Mild Pain , Headache or Fever. - Oral    acetaminophen (TYLENOL) tablet 650 mg 650 mg Every 4 Hours PRN 10/8/2017     Sig - Route: Take 2 tablets by mouth Every 4 (Four) Hours As Needed for Mild Pain . - Oral    amitriptyline (ELAVIL) tablet 75 mg 75 mg Nightly 10/8/2017     Sig - Route: Take 75 mg by mouth Every Night. - Oral    aspirin chewable tablet 81 mg 81 mg Daily 10/8/2017     Sig - Route: Chew 1 tablet Daily. - Oral    atorvastatin (LIPITOR) tablet 40 mg 40 mg Nightly 10/8/2017     Sig - Route: Take 1 tablet by mouth Every Night. - Oral    bisacodyl (DULCOLAX) EC tablet 10 mg 10 mg Daily PRN 10/8/2017     Sig - Route: Take 2 tablets by mouth Daily As Needed for Constipation. - Oral    bisacodyl (DULCOLAX) suppository 10 mg 10 mg Daily PRN 10/8/2017     Sig - Route: Insert 1 suppository into the rectum Daily As Needed for Constipation. - Rectal    carvedilol (COREG) tablet 12.5 mg 12.5 mg Every 12 Hours Scheduled 10/8/2017     Sig - Route:  "Take 1 tablet by mouth Every 12 (Twelve) Hours. - Oral    cefTRIAXone (ROCEPHIN) IVPB 2 g 2 g Every 24 Hours 10/9/2017     Sig - Route: Infuse 50 mL into a venous catheter Daily. - Intravenous    cetirizine (zyrTEC) tablet 10 mg 10 mg Nightly 10/8/2017     Sig - Route: Take 1 tablet by mouth Every Night. - Oral    citalopram (CeleXA) tablet 20 mg 20 mg Daily 10/8/2017     Sig - Route: Take 1 tablet by mouth Daily. - Oral    clopidogrel (PLAVIX) tablet 75 mg 75 mg Daily 10/8/2017     Sig - Route: Take 1 tablet by mouth Daily. - Oral    colchicine tablet 0.6 mg 0.6 mg Daily 10/8/2017     Sig - Route: Take 1 tablet by mouth Daily. - Oral    diclofenac (VOLTAREN) 1 % gel 4 g 4 g 4 Times Daily PRN 10/8/2017     Sig - Route: Apply 4 g topically 4 (Four) Times a Day As Needed (apply to painful joints). - Topical    diltiaZEM CD (CARDIZEM CD) 24 hr capsule 120 mg 120 mg Daily 10/8/2017     Sig - Route: Take 1 capsule by mouth Daily. - Oral    docusate sodium (COLACE) capsule 100 mg 100 mg Daily 10/8/2017     Sig - Route: Take 1 capsule by mouth Daily. - Oral    docusate sodium (COLACE) capsule 100 mg 100 mg 2 Times Daily PRN 10/8/2017     Sig - Route: Take 1 capsule by mouth 2 (Two) Times a Day As Needed for Constipation. - Oral    famotidine (PEPCID) tablet 20 mg 20 mg 2 Times Daily 10/8/2017     Sig - Route: Take 1 tablet by mouth 2 (Two) Times a Day. - Oral    heparin (porcine) 5000 UNIT/ML injection 5,000 Units 5,000 Units Every 8 Hours Scheduled 10/10/2017     Sig - Route: Inject 1 mL under the skin Every 8 (Eight) Hours. - Subcutaneous    HYDROmorphone (DILAUDID) injection 0.5 mg 0.5 mg Every 2 Hours PRN 10/8/2017 10/18/2017    Sig - Route: Infuse 0.5 mL into a venous catheter Every 2 (Two) Hours As Needed for Severe Pain . - Intravenous    Linked Group 1:  \"And\" Linked Group Details        influenza vac split quad (FLUZONE,FLUARIX,AFLURIA) injection 0.5 mL 0.5 mL During Hospitalization 10/8/2017     Sig - Route: " "Inject 0.5 mL into the shoulder, thigh, or buttocks During Hospitalization for Immunization. - Intramuscular    ketorolac (TORADOL) injection 15 mg 15 mg Every 6 Hours 10/8/2017 10/13/2017    Sig - Route: Infuse 15 mg into a venous catheter Every 6 (Six) Hours. - Intravenous    Magnesium Sulfate 2 gram Bolus, followed by 8 gram infusion (total Mg dose 10 grams)- Mg less than or equal to 1mg/dL 2 g As Needed 10/8/2017     Sig - Route: Infuse 50 mL into a venous catheter As Needed (Mg less than or equal to 1mg/dL). - Intravenous    Linked Group 2:  \"Or\" Linked Group Details        magnesium sulfate 4 gram infusion- Mg 1.6-1.9 mg/dL 4 g As Needed 10/8/2017     Sig - Route: Infuse 100 mL into a venous catheter As Needed (Mg 1.6-1.9 mg/dL). - Intravenous    Linked Group 2:  \"Or\" Linked Group Details        Magnesium Sulfate 6 gram Infusion (2 gm x 3) -Mg 1.1 -1.5 mg/dL 2 g As Needed 10/8/2017     Sig - Route: Infuse 50 mL into a venous catheter As Needed (Mg 1.1 -1.5 mg/dL). - Intravenous    Linked Group 2:  \"Or\" Linked Group Details        melatonin sublingual tablet 5 mg 5 mg Nightly PRN 10/8/2017     Sig - Route: Place 1 tablet under the tongue At Night As Needed (sleep). - Sublingual    naloxone (NARCAN) injection 0.1 mg 0.1 mg Every 5 Minutes PRN 10/8/2017     Sig - Route: Infuse 0.25 mL into a venous catheter Every 5 (Five) Minutes As Needed for Respiratory Depression. - Intravenous    Linked Group 1:  \"And\" Linked Group Details        ondansetron (ZOFRAN) injection 4 mg 4 mg Every 6 Hours PRN 10/8/2017     Sig - Route: Infuse 2 mL into a venous catheter Every 6 (Six) Hours As Needed for Nausea or Vomiting. - Intravenous    ondansetron (ZOFRAN) injection 4 mg 4 mg Every 6 Hours PRN 10/8/2017     Sig - Route: Infuse 2 mL into a venous catheter Every 6 (Six) Hours As Needed for Nausea or Vomiting. - Intravenous    Linked Group 3:  \"Or\" Linked Group Details        ondansetron (ZOFRAN) tablet 4 mg 4 mg Every 6 Hours " "PRN 10/8/2017     Sig - Route: Take 1 tablet by mouth Every 6 (Six) Hours As Needed for Nausea or Vomiting. - Oral    Linked Group 3:  \"Or\" Linked Group Details        oxyCODONE (ROXICODONE) immediate release tablet 10 mg 10 mg Every 4 Hours PRN 10/8/2017 10/18/2017    Sig - Route: Take 2 tablets by mouth Every 4 (Four) Hours As Needed for Moderate Pain  or Severe Pain . - Oral    oxyCODONE-acetaminophen (PERCOCET) 5-325 MG per tablet 1 tablet 1 tablet Every 4 Hours PRN 10/8/2017 10/18/2017    Sig - Route: Take 1 tablet by mouth Every 4 (Four) Hours As Needed for Moderate Pain . - Oral    oxyCODONE-acetaminophen (PERCOCET) 5-325 MG per tablet 2 tablet 2 tablet Every 4 Hours PRN 10/8/2017 10/18/2017    Sig - Route: Take 2 tablets by mouth Every 4 (Four) Hours As Needed for Severe Pain . - Oral    pantoprazole (PROTONIX) EC tablet 40 mg 40 mg Every Early Morning 10/8/2017     Sig - Route: Take 1 tablet by mouth Every Morning. - Oral    Pharmacy to dose vancomycin  Continuous PRN 10/8/2017     Sig - Route: Continuous As Needed for Consult. - Does not apply    potassium chloride (KLOR-CON) packet 40 mEq 40 mEq As Needed 10/8/2017     Sig - Route: Take 40 mEq by mouth As Needed (potassium replacement, see admin instructions). - Oral    Linked Group 4:  \"Or\" Linked Group Details        potassium chloride (MICRO-K) CR capsule 40 mEq 40 mEq As Needed 10/8/2017     Sig - Route: Take 4 capsules by mouth As Needed (potassium replacement.  see admin instructions). - Oral    Linked Group 4:  \"Or\" Linked Group Details        potassium chloride 10 mEq in 100 mL IVPB 10 mEq Every 1 Hour PRN 10/8/2017     Sig - Route: Infuse 100 mL into a venous catheter Every 1 (One) Hour As Needed (potassium protocol PERIPHERAL - see admin instructions). - Intravenous    Linked Group 4:  \"Or\" Linked Group Details        sodium chloride 0.9 % flush 1-10 mL 1-10 mL As Needed 10/8/2017     Sig - Route: Infuse 1-10 mL into a venous catheter As Needed " "for Line Care. - Intravenous    sodium chloride 0.9 % flush 1-10 mL 1-10 mL As Needed 10/8/2017     Sig - Route: Infuse 1-10 mL into a venous catheter As Needed for Line Care. - Intravenous    sodium chloride 0.9 % flush 10 mL 10 mL As Needed 10/7/2017     Sig - Route: Infuse 10 mL into a venous catheter As Needed for Line Care. - Intravenous    Linked Group 5:  \"And\" Linked Group Details        sodium chloride 0.9 % infusion 100 mL/hr Continuous 10/8/2017     Sig - Route: Infuse 100 mL/hr into a venous catheter Continuous. - Intravenous    traMADol (ULTRAM) tablet 50 mg 50 mg 4 Times Daily PRN 10/8/2017     Sig - Route: Take 1 tablet by mouth 4 (Four) Times a Day As Needed for Moderate Pain  or Severe Pain . - Oral    vancomycin IVPB 1500 mg in 0.9% NaCl (Premix) 500 mL 15 mg/kg × 101 kg Every 12 Hours 10/8/2017     Sig - Route: Infuse 500 mL into a venous catheter Every 12 (Twelve) Hours. - Intravenous    !vanc trough 10/9@1330, do not hang dose until lab drawn and pharmacy reviewed (Discontinued)  Once 10/9/2017 10/9/2017    Sig - Route: 1 (One) Time. - Does not apply    piperacillin-tazobactam (ZOSYN) 4.5 g/100 mL 0.9% NS IVPB (mbp) (Discontinued) 4.5 g Every 8 Hours 10/8/2017 10/9/2017    Sig - Route: Infuse 100 mL into a venous catheter Every 8 (Eight) Hours. - Intravenous    sodium chloride 0.9 % infusion (Discontinued) 100 mL/hr Continuous 10/8/2017 10/10/2017    Sig - Route: Infuse 100 mL/hr into a venous catheter Continuous. - Intravenous    sodium chloride 0.9 % infusion (Discontinued) 100 mL/hr Continuous 10/8/2017 10/10/2017    Sig - Route: Infuse 100 mL/hr into a venous catheter Continuous. - Intravenous             Operative/Procedure Notes (most recent note)      Addy Ortiz MD at 10/8/2017  1:17 PM  Version 1 of 1         OPERATIVE REPORT     DATE OF PROCEDURE: 10/8/17     SURGEON: Addy Ortiz M.D.     ASSISTANT(S): Circulator: Reena Bee RN; Zana Yang RN  Scrub Person: " Lo Anne; Josye Kemp    PREOPERATIVE DIAGNOSIS: Septic arthritis left knee, septic arthritis right wrist, septic arthritis left wrist     POSTOPERATIVE DIAGNOSIS: same     PROCEDURE: Right wrist arthrotomy with irrigation and debridement for septic arthritis, left wrist arthrotomy with irrigation and debridement for septic arthritis, right knee arthrotomy with irrigation and debridement for septic arthritis    SURGICAL DETAILS:     APPROACH: Bilateral wrists: Volar Trae approach to the volar wrist capsule, subcutaneous ulnar approach between ECU and FCU for dorsolateral wrist capsule  Left knee: medial parapatellar arthrotomy     ANESTHESIA: Gen.     PREOPERATIVE ANTIBIOTICS: Zosyn 4.5 g     ESTIMATED BLOOD LOSS: 75 cc     TOURNIQUET TIME: 33 min @ 250 mmHg right wrist, 22 min @ 250 mmHg left wrist, 18 min @ 250 mmHg left knee    SPECIMENS: None.  Prior aspirate performed before antibiotics started in the ER.  Aerobic and anaerobic cultures from ER aspiration are pending     IMPLANTS: None     DRAINS: None     LOCAL INJECTION: None     MODIFIER(S): None     COMPLICATIONS: None apparent     INDICATIONS FOR PROCEDURE: Mr. Guevara is a 57-year-old rheumatoid arthritis patient who presented to [facility] ER complaining of bilateral wrist and left knee pain for approximately 3-4 weeks.  He did have a brief history of flulike symptoms approximately 3-4 weeks ago which coincided with his joint swelling.  Over the past 3-4 weeks, his swelling and pain have gotten progressively worse.  This is also complicated by the fact that he had stopped taking his home methotrexate for his rheumatoid arthritis.  He finally presented to the ER due to worsening symptoms and increasing difficulty with ambulation.  ER workup revealed ESR of 95 and CRP of 9.  Due to significant pain and swelling about the bilateral wrists and left knee, aspirations of these joints were performed and personally.  Aspirations revealed greater than  100,000 white blood cells in each joint with greater than 90% neutrophils.  Given these findings, I discussed with the patient that these labs were most consistent with septic arthritis of the bilateral wrist and left knee joints.  I recommended surgical arthrotomy of all joints with irrigation and debridement.  I discussed operative and nonoperative treatment options and explained the risks associated with nonoperative treatment including continued worsening pain, possible sepsis.  After explaining this, the patient agreed to proceed with surgical intervention.  The risks, benefits, alternatives, and potential complications of the this surgery were discussed with the patient in detail to include but not limited to infection, bleeding, anesthesia risks, nerve injury, vessel injury, pain, blood clots, possible need for blood transfusion, possible need for further procedures, myocardial infarction, stroke, and death.  Specific risks for this surgery also include recurrent infection, joint stiffness, acceleration of arthritis. Specific details of the procedure, hospitalization, recovery, rehabilitation, and long-term precautions were also provided. Pre-operative teaching was provided. Surgical device selection was outlined, and the many options available were explained; final choices will be made at the time of the procedure to match the anatomy and condition of the bone, and soft tissue structures. Understanding of all topics was conveyed to me by the patient, and consent was given to proceed with a bilateral wrist arthrotomy with irrigation and debridement, left knee arthrotomy with irrigation and debridement.     INTRAOPERATIVE FINDINGS: Purulent fluid expressed from bilateral wrists and left knee.  Instability of the ulnocarpal joint and radiocarpal joint and bilateral wrists consistent with advanced rheumatoid arthritic changes     PROCEDURE: The patient was identified in the preoperative holding area. The  operative site was confirmed and marked. A sequential compression device was placed on the nonoperative leg. The risks, benefits, and alternatives to surgery were again confirmed with the patient and the patient wished to proceed. The patient was brought to the operating room and placed on the operating room table in the supine position. A huddle was performed with the patient and all vital surgical team members to confirm the correct operative site, procedure, anesthesia type, and operative plan with the patient. After anesthesia was performed, the patient was positioned in the supine position. All bony prominences were padded.  A nonsterile tourniquet was placed on the bilateral arms and the bilateral arms were positioned on arm tables.  A nonsterile tourniquet was placed on the patient's left thigh and a bump under the patient's left hip. Intravenous antibiotic prophylaxis was given and confirmed with the anesthesia team. The operative extremities were prepped and draped in the usual sterile fashion. A surgical time out was performed immediately preceding the procedure with all personnel in the operating room to confirm patient identity, the correct operative site and extremity, correct radiographic studies, availability of appropriate surgical equipment and agreement on the planned procedures.     Attention was turned first to the right wrist which was elevated and gravity-assisted exsanguination occurred.  The tourniquet was inflated.  Due to large fluctuant mass is localized to the volar radial aspect of the joint and dorsal ulnar aspect of the joint, 2 incisions were made for each wrist.  First incision was performed using a volar Trae approach.  Incision incurred down through subcutaneous tissue down to the FCR tendon.  The sheath was incised and the tendon was reflected ulnarly.  The deep tendon sheath was then incised and the flexor pollicis muscle belly was retracted ulnarly.  At this point, the bulging  and ruptured volar wrist capsule was identified.  A small arthrotomy was made in the wrist joint capsule which resulted in expression of purulent fluid from the wrist joint.  Direct visualization of the radiocarpal joint occurred and suction removed any remaining purulent fluid.  A rongeur was utilized to debride any necrotic-appearing tissue.  Once all necrotic tissue was removed and only healthy appearing tissue remained, attention was turned to the ulnar approach.  Incision incurred on the subcutaneous border at the distal aspect of the ulna utilizing the interval between the ECU and FCU.  Once again patulous capsule was bulging and identified.  An arthrotomy was made into the patulous capsule and medial purulent fluid was expressed.  The fluid was suctioned and direct visualization of the ulnocarpal joint was identified.  All necrotic appearing tissue was debrided using a rongeur to healthy wound bed.  Please note that all purulent fluid was consistent with the prior aspirated fluid from the ER.  Therefore no further samples were obtained.    Once decompression of the wrist joint was complete, a total of 6 L of sterile saline was administered through the wrist joint using cystoscopy tubing.  3 L of irrigation occurred through the volar incision in 3 L through the ulnar-based incision.  At the conclusion of the irrigation, direct inspection of the wounds revealed no further purulent fluid and no evidence of necrotic tissue.  The tourniquet was then deflated and hemostasis obtained with electrocautery.  The radial artery was palpated and found to be intact.  Layered closure then occurred using 2-0 PDS suture for the arthrotomy, 2-0 PDS interrupted suture for the subcutaneous layer, and 3-0 nylon for skin.  Sterile dressing consisting of Xeroform, 4 x 4, sterile soft roll and Ace wrap were applied.  Compartments remain soft and capillary refills less than 2 seconds to all digits.    Next attention was turned to the  left wrist where the same approaches and debridements occurred in like fashion to the right wrist.  Laila material was purulent fluid was suctioned from the wrist joints and debridement occurred with rongeur to healthy wound beds.  Gravity exsanguination occurred prior to tourniquet inflation, and hemostasis after tourniquet deflation was performed with electrocautery.  Like the right wrist, purulent fluid was expressed from both arthrotomy points and 6 L of sterile saline were irrigated through the wrist joint, 3 L through the volar incision and 3 L through the ulnar-based incision.  Upon tourniquet deflation, the radial artery was found to be intact.  The wound was closed in similar fashion with similar sterile dressing.    Next attention was turned to the left knee.  The extremity was elevated gravity was used to assist with exsanguination before tourniquet inflation.  A standard midline incision was made through skin subcutaneous tissue down to the level of the retinaculum.  A medial parapatellar arthrotomy was then performed and purulent fluid was expressed.  The joint was suctioned and necrotic appearing synovium was debrided using a rongeur.  Once all necrotic tissue was debrided, 6 L of sterile saline were used to irrigate the left knee arthrotomy.  Upon conclusion the knee was inspected and found that no purulent fluid remained in no purulent or necrotic tissue remained.  The tourniquet was then deflated and hemostasis was obtained with electrocautery.  The wound was closed in layered fashion using 0 PDS suture to close the arthrotomy followed by 2-0 PDS suture for the subcutaneous layer and 3-0 nylon for skin.  An Aquacel dressing was then placed over the incision.    Once complete, the patient was then awakened from anesthesia and transferred to the PACU in stable condition    No apparent complications occurred during the procedure Instrument, sponge and needle counts were correct x 2.     POST  OPERATIVE PLAN:   Weight Bearing: As tolerated through all extremities with range of motion as tolerated   DVT prophylaxis: Per primary team   Therapy/Activity: PT/OT for mobilization and durable equipment needs   Wound Care: Dressings remain in place for 7 days   Pain control: IV and by mouth pain medications as needed.   Continue broad-spectrum antibiotic coverage.  Follow up intraoperative cultures.  Infectious disease consult from [facility]   Social work for discharge planning   Follow up: Dr. Ortiz 2 weeks for wound check and suture removal       Electronically signed by Addy Ortiz MD at 10/8/2017  1:39 PM           Physical Therapy Notes (last 72 hours) (Notes from 10/7/2017  2:30 PM through 10/10/2017  2:30 PM)      oDri Mancini, PT at 10/9/2017  1:27 PM  Version 1 of 1         Problem: Patient Care Overview (Adult)  Goal: Plan of Care Review  Outcome: Ongoing (interventions implemented as appropriate)    10/09/17 1323   Coping/Psychosocial Response Interventions   Plan Of Care Reviewed With patient   Outcome Evaluation   Outcome Summary/Follow up Plan Pt ambulates to chair with MAX Ax2 +1 for equipment using rolling platform walker 2/2 wrist pain/ bandaging and knee immobilizer on RLE due to RLE buckling when attempting to WB through LLE. Pt limited 2/2 pain and weakness. Pt will need to inpatient rehab upon d/c.         Problem: Inpatient Physical Therapy  Goal: Bed Mobility Goal LTG- PT  Outcome: Ongoing (interventions implemented as appropriate)    10/09/17 1323   Bed Mobility PT LTG   Bed Mobility PT LTG, Date Established 10/09/17   Bed Mobility PT LTG, Time to Achieve 2 wks   Bed Mobility PT LTG, Activity Type supine to sit/sit to supine   Bed Mobility PT LTG, [place] Level conditional independence       Goal: Transfer Training Goal 1 LTG- PT  Outcome: Ongoing (interventions implemented as appropriate)    10/09/17 1323   Transfer Training PT LTG   Transfer Training PT  LTG, Date Established 10/09/17   Transfer Training PT LTG, Time to Achieve 2 wks   Transfer Training PT LTG, Activity Type sit to stand/stand to sit   Transfer Training PT LTG, [place] Level minimum assist (75% patient effort)   Transfer Training PT LTG, Assist Device walker, platform       Goal: Gait Training Goal LTG- PT  Outcome: Ongoing (interventions implemented as appropriate)    10/09/17 1323   Gait Training PT LTG   Gait Training Goal PT LTG, Date Established 10/09/17   Gait Training Goal PT LTG, Time to Achieve 2 wks   Gait Training Goal PT LTG, [place] Level moderate assist (50% patient effort)   Gait Training Goal PT LTG, Assist Device walker, rolling platform       Goal: Stair Training Goal LTG- PT  Outcome: Ongoing (interventions implemented as appropriate)    10/09/17 1323   Stair Training PT LTG   Stair Training Goal PT LTG, Date Established 10/09/17   Stair Training Goal PT LTG, Time to Achieve 2 wks   Stair Training Goal PT LTG, Number of Steps 2   Stair Training Goal PT LTG, [place] Level moderate assist (50% patient effort);2 person assist required   Stair Training Goal PT LTG, Assist Device 2 handrails;walker, platform              Electronically signed by Dori Mancini, PT at 10/9/2017  1:27 PM      Dori Mancini PT at 10/9/2017  2:05 PM  Version 1 of 1         Acute Care - Physical Therapy Initial Evaluation  [facility]     Patient Name: Richard Guevara  : 1960  MRN: [id number]  Today's Date: 10/9/2017   Onset of Illness/Injury or Date of Surgery Date: 10/08/17  Date of Referral to PT: 10/08/17  Referring Physician: Angel      Admit Date: 10/7/2017     Visit Dx:    ICD-10-CM ICD-9-CM   1. Rheumatoid arthritis flare M06.9 714.0   2. Tachycardia R00.0 785.0   3. Pyogenic arthritis of multiple sites, due to unspecified organism M00.9 711.09   4. Impaired mobility and ADLs Z74.09 799.89   5. Impaired functional mobility, balance, gait, and endurance Z74.09 V49.89      Patient Active Problem List   Diagnosis   • Rheumatoid arthritis   • Gout   • Hypertension   • CAD (coronary artery disease)   • HLD (hyperlipidemia)   • Acute joint pain   • Joint erythema   • Altered mental status   • Immobility   • Weakness   • Rheumatoid arthritis flare   • Tachycardia   • Pyogenic arthritis of multiple sites     Past Medical History:   Diagnosis Date   • Altered mental status 10/8/2017   • CAD (coronary artery disease) 10/8/2017   • Erythematous condition 10/8/2017   • Gout 10/8/2017   • HLD (hyperlipidemia) 10/8/2017   • Hyperlipidemia    • Hypertension    • Immobility 10/8/2017   • Joint erythema 10/8/2017   • Joint pain 10/8/2017   • RA (rheumatoid arthritis)    • Rheumatoid arthritis 10/8/2017   • Tachycardia 10/8/2017   • Weakness 10/8/2017     Past Surgical History:   Procedure Laterality Date   • CORONARY ANGIOPLASTY WITH STENT PLACEMENT      x1, around 2007 edith   • SKIN BIOPSY      hx a few suspicious spots removed, only required removal, unsure of result          PT ASSESSMENT (last 72 hours)      PT Evaluation       10/09/17 0850 10/09/17 0835    Rehab Evaluation    Document Type evaluation  - evaluation;therapy note (daily note)  -    Subjective Information agree to therapy;complains of;pain;weakness  - no complaints;agree to therapy  -ST    Patient Effort, Rehab Treatment  good  -ST    Symptoms Noted During/After Treatment  fatigue;increased pain  -ST    Symptoms Noted Comment  RN notified   -    General Information    Patient Profile Review yes  - yes  -ST    Onset of Illness/Injury or Date of Surgery Date 10/08/17  - 10/07/17  -    Referring Physician Angel  - MD Angel  -    General Observations Pt supine in bed with HOB elevated, son in room.  - pt supine upon arrival; IV and 02 NC intact; son present; B ACE bandages to wrists  -ST    Pertinent History Of Current Problem Pt presents to hospital after recent bout of flu with hx of RA and c/o [place] wrist  pain, and L knee pain. Found to have septic arthritis now s/p I &D of [place] wrists and L knee. ROM as tolerated, WBAT each joint.  - Pt presents with L knee pain and B wrist pain d/t septic arthritis. S/P exploration and drainage of L knee, arthrotomy of radiocarpal/midcarpal w/drainage and exploration B wrists; WBAT BUE's, LLE with ROM as tolerated all joints.   -    Precautions/Limitations fall precautions;other (see comments)   WBAT BUE, LLE, ROM as tolerated.  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  - independent:;all household mobility;community mobility;transfer;feeding;grooming;dressing;bathing;home management   prior to RA flair, pt independent, uses SPC occassionally   -    Equipment Currently Used at Home wheelchair;walker, standard   was not using prior  - wheelchair;cane, straight;walker, rolling  -    Plans/Goals Discussed With patient;agreed upon  - patient and family;agreed upon  -    Risks Reviewed patient:;LOB;increased discomfort  - patient and family:;LOB;nausea/vomiting;dizziness;increased discomfort;change in vital signs  -    Benefits Reviewed patient:;improve function;increase independence  - patient and family:;improve function;increase independence;increase strength;increase balance;decrease pain;increase knowledge  -    Barriers to Rehab previous functional deficit;medically complex  - previous functional deficit;medically complex  -ST    Living Environment    Lives With other (see comments)   mother  - other (see comments)   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   -    Living Arrangements house  - house  -ST    Home Accessibility stairs to enter home;tub/shower is not walk in   walk in and tub shower; one level  - stairs to enter home;tub/shower is not walk in   also with walk-in  shower   -    Number of Stairs to Enter Home 2  - 2  -ST    Stair Railings at Home none  - none  -    Living Environment Comment Pt retired; has falls; son visits often  - son comes by frequently, pt retired; has falls   -    Clinical Impression    Date of Referral to PT 10/08/17  -     PT Diagnosis decreased functional mobility, decreased strength, decreased ROM,  -     Criteria for Skilled Therapeutic Interventions Met yes;treatment indicated  -     Rehab Potential fair, will monitor progress closely  -     Pain Assessment    Pain Assessment 0-10  - 0-10  -ST    Pain Score 8  -EH 8  -ST    Post Pain Score 8  - 8  -ST    Pain Type Acute pain  - Acute pain  -    Pain Location Knee   and L>R wrist  - Wrist  -    Pain Intervention(s) Repositioned;Ambulation/increased activity  - Repositioned  -    Response to Interventions tolerated  -     Vision Assessment/Intervention    Visual Impairment WFL  - WNL  -ST    Cognitive Assessment/Intervention    Current Cognitive/Communication Assessment functional  - functional  -    Orientation Status oriented x 4  - oriented x 4  -ST    Follows Commands/Answers Questions 100% of the time;able to follow single-step instructions  - 100% of the time  -    Personal Safety mild impairment;decreased awareness, need for assist;decreased awareness, need for safety  - mild impairment  -    Personal Safety Interventions fall prevention program maintained;gait belt;nonskid shoes/slippers when out of bed  - fall prevention program maintained;gait belt;nonskid shoes/slippers when out of bed  -    ROM (Range of Motion)    General ROM lower extremity range of motion deficits identified  - upper extremity range of motion deficits identified  -    General ROM Detail LLE knee flexion limited to ~45 degrees, extension limitied ~ 25 degrees.  -     MMT (Manual Muscle Testing)    General MMT Assessment  upper extremity strength deficits  identified  -    General MMT Assessment Detail L Knee extension 1/5, knee flexion 3/5,DF 4/5. LLE knee extension 4/5, DF, PF 4/5.  -     Bed Mobility, Assessment/Treatment    Bed Mob, Supine to Sit, [place] minimum assist (75% patient effort);2 person assist required  -     Bed Mob, Sit to Supine, [place] not tested  -     Transfer Assessment/Treatment    Transfers, Sit-Stand [place] moderate assist (50% patient effort);2 person assist required   + 1 to assist with stablization of Walker/ IV pole.  -     Transfers, Stand-Sit [place] maximum assist (25% patient effort);2 person assist required   +3rd for equipment, pt son assists LLE to chair  -     Transfers, Sit-Stand-Sit, Assist Device rolling platform walker;other (see comments)   KI  -     Transfer, Comment Pt performs sit to stand with bed elevated using platform walker to stand. Once standing pt instructed to WB through LLE, but RLE knee frantz with continued knee bend on LLE. Pt needs assist of PT/OT to maintain standing. Bed brought to pt to sit. 2nd attempt made with KI on RLE, platform walker and elevated bed surface. Pt able to stand with MAX Ax2, take steps toward chair with cues for upright posture, sequencing and MAX Ax2.  -     Gait Assessment/Treatment    Gait, [place] Level maximum assist (25% patient effort);upper extremity support;other (see comments)   3rd person for equipment.  -     Gait, Assistive Device rolling platform walker;other (see comments)   KI  -     Gait, Distance (Feet) 5  -     Gait, Gait Pattern Analysis swing-to gait  -     Gait, Maintain Weight Bearing Status cues to maintain weight bearing status   pt using touch down weight bearing only,  -     Gait, Comment BSC moved behind pt as able, support given at gait belt, hips to maintain upright during gait.  -     Therapy Exercises    Bilateral Lower Extremities AROM:;10 reps;ankle pumps/circles;quad sets;glut sets;heel slides   -EH     Sensory Assessment/Intervention    Light Touch LLE;RLE  -EH     LLE Light Touch WNL  -EH     RLE Light Touch mild impairment  -EH     Positioning and Restraints    Pre-Treatment Position in bed  -EH     Post Treatment Position chair  -EH     In Chair notified nsg;reclined;call light within reach;encouraged to call for assist;exit alarm on;with OT;waffle cushion  -EH       10/09/17 0800 10/09/17 0157    General Information    Equipment Currently Used at Home  wheelchair;walker, standard  -AD    Living Environment    Transportation Available  car;family or friend will provide  -AD    Muscle Tone Assessment    Muscle Tone Assessment Bilateral Upper Extremities  -MARIANA     Bilateral Upper Extremities Muscle Tone Assessment rigidity  -MARIANA       10/08/17 0723 10/08/17 0238    General Information    Equipment Currently Used at Home  wheelchair;walker, standard  -AD    Living Environment    Lives With  parent(s)  -AD    Living Arrangements  house  -AD    Home Accessibility  no concerns  -AD    Stair Railings at Home  none  -AD    Type of Financial/Environmental Concern  none  -AD    Transportation Available car;family or friend will provide  -AD car;family or friend will provide  -AD      User Key  (r) = Recorded By, (t) = Taken By, (c) = Cosigned By    Initials Name Provider Type     Dori Mancini, PT Physical Therapist    ST Whitney Nails OTR Occupational Therapist    MARIANA Schumacher LPN Licensed Nurse    DEN Ramos RN Registered Nurse          Physical Therapy Education     Title: PT OT SLP Therapies (Active)     Topic: Physical Therapy (Active)     Point: Mobility training (Active)    Learning Progress Summary    Learner Readiness Method Response Comment Documented by Status   Patient Acceptance E NR   10/09/17 1323 Active   Family Acceptance E NR   10/09/17 1323 Active               Point: Home exercise program (Active)    Learning Progress Summary    Learner Readiness Method  Response Comment Documented by Status   Patient Acceptance E NR   10/09/17 1323 Active   Family Acceptance E NR   10/09/17 1323 Active               Point: Body mechanics (Active)    Learning Progress Summary    Learner Readiness Method Response Comment Documented by Status   Patient Acceptance E NR   10/09/17 1323 Active   Family Acceptance E NR   10/09/17 1323 Active               Point: Precautions (Active)    Learning Progress Summary    Learner Readiness Method Response Comment Documented by Status   Patient Acceptance E NR   10/09/17 1323 Active   Family Acceptance E [facility] 10/09/17 1323 Active                      User Key     Initials Effective Dates Name Provider Type Discipline     06/19/15 -  Dori Mancini, PT Physical Therapist PT                PT Recommendation and Plan  Anticipated Equipment Needs At Discharge: bedside commode  Anticipated Discharge Disposition: inpatient rehabilitation facility  PT Frequency: daily  Plan of Care Review  Plan Of Care Reviewed With: patient  Outcome Summary/Follow up Plan: Pt ambulates to chair with MAX Ax2 +1 for equipment using rolling platform walker 2/2 wrist pain/ bandaging and knee immobilizer on RLE due to RLE buckling when attempting to WB through LLE. Pt limited 2/2 pain and weakness. Pt will need to inpatient rehab upon d/c.          IP PT Goals       10/09/17 1323          Bed Mobility PT LTG    Bed Mobility PT LTG, Date Established 10/09/17  -      Bed Mobility PT LTG, Time to Achieve 2 wks  -EH      Bed Mobility PT LTG, Activity Type supine to sit/sit to supine  -      Bed Mobility PT LTG, [place] Level conditional independence  -EH      Transfer Training PT LTG    Transfer Training PT LTG, Date Established 10/09/17  -      Transfer Training PT LTG, Time to Achieve 2 wks  -EH      Transfer Training PT LTG, Activity Type sit to stand/stand to sit  -      Transfer Training PT LTG, [place] Level minimum assist (75% patient  effort)  -      Transfer Training PT LTG, Assist Device walker, platform  -      Gait Training PT LTG    Gait Training Goal PT LTG, Date Established 10/09/17  -      Gait Training Goal PT LTG, Time to Achieve 2 wks  -      Gait Training Goal PT LTG, [place] Level moderate assist (50% patient effort)  -      Gait Training Goal PT LTG, Assist Device walker, rolling platform  -      Stair Training PT LTG    Stair Training Goal PT LTG, Date Established 10/09/17  -      Stair Training Goal PT LTG, Time to Achieve 2 wks  -      Stair Training Goal PT LTG, Number of Steps 2  -      Stair Training Goal PT LTG, [place] Level moderate assist (50% patient effort);2 person assist required  -      Stair Training Goal PT LTG, Assist Device 2 handrails;walker, platform  -        User Key  (r) = Recorded By, (t) = Taken By, (c) = Cosigned By    Initials Name Provider Type    NIKITA Mancini, PT Physical Therapist                Outcome Measures       10/09/17 0850          How much help from another person do you currently need...    Turning from your back to your side while in flat bed without using bedrails? 2  -EH      Moving from lying on back to sitting on the side of a flat bed without bedrails? 2  -EH      Moving to and from a bed to a chair (including a wheelchair)? 2  -EH      Standing up from a chair using your arms (e.g., wheelchair, bedside chair)? 2  -EH      Climbing 3-5 steps with a railing? 1  -EH      To walk in hospital room? 1  -EH      AM-[facility] 6 Clicks Score 10  -      Functional Assessment    Outcome Measure Options AM-PAC 6 Clicks Basic Mobility (PT)  -        User Key  (r) = Recorded By, (t) = Taken By, (c) = Cosigned By    Initials Name Provider Type    NIKITA Mancini, PT Physical Therapist           Time Calculation:         PT Charges       10/09/17 1328          Time Calculation    Start Time 0850  -      PT Received On 10/09/17  -      PT Goal Re-Cert  Due Date 10/19/17  -      Time Calculation- PT    Total Timed Code Minutes- PT 10 minute(s)  -        User Key  (r) = Recorded By, (t) = Taken By, (c) = Cosigned By    Initials Name Provider Type     Dori Mancini PT Physical Therapist          Therapy Charges for Today     Code Description Service Date Service Provider Modifiers Qty    12272055754 HC PT THER PROC EA 15 MIN 10/9/2017 Dori Mancini, PT GP 1    80490625712 HC PT EVAL MOD COMPLEXITY 4 10/9/2017 Dori Mancini, PT GP 1    33745460721 HC PT THER SUPP EA 15 MIN 10/9/2017 Dori Mancini, PT GP 2          PT G-Codes  Outcome Measure Options: AM-PAC 6 Clicks Basic Mobility (PT)      Dori Mancini PT  10/9/2017            Electronically signed by Dori Mancini PT at 10/9/2017  2:05 PM           Occupational Therapy Notes (last 24 hours) (Notes from 10/9/2017  2:30 PM through 10/10/2017  2:30 PM)      Whitney Nails, OTR at 10/9/2017  3:18 PM  Version 1 of 1         Problem: Patient Care Overview (Adult)  Goal: Plan of Care Review  Outcome: Ongoing (interventions implemented as appropriate)    10/09/17 0835   Coping/Psychosocial Response Interventions   Plan Of Care Reviewed With patient   Outcome Evaluation   Outcome Summary/Follow up Plan Pt s/p drainage of L knee and bilateral wrists and now w/limited mobility and ROM impacting ADLs, transfers and mobility. Pt max X2 for transfers/mobility with platform rwx and required use of KI for RLE d/t buckling w/standing. Pt with significantly complicated issues impacting rehab progress this date & would benefit greatly from IP or SNF level rehab at d/c. Pt notes mult. falls at home.          Problem: Inpatient Occupational Therapy  Goal: Bed Mobility Goal LTG- OT  Outcome: Ongoing (interventions implemented as appropriate)    10/09/17 0835   Bed Mobility OT LTG   Bed Mobility OT LTG, Time to Achieve 2 wks   Bed Mobility OT LTG, Activity Type supine to sit/sit to supine   Bed  Mobility OT LTG, [place] Level contact guard assist   Bed Mobility OT LTG, Assist Device bed rails   Bed Mobility OT LTG, Outcome goal ongoing       Goal: Transfer Training Goal 1 LTG- OT  Outcome: Ongoing (interventions implemented as appropriate)    10/09/17 0835   Transfer Training OT LTG   Transfer Training OT LTG, Time to Achieve 2 wks   Transfer Training OT LTG, Activity Type toilet;sit to stand/stand to sit;bed to chair /chair to bed   Transfer Training OT LTG, [place] Level minimum assist (75% patient effort);2 person assist required   Transfer Training OT LTG, Assist Device walker, rolling platform   Transfer Training OT LTG, Outcome goal ongoing       Goal: Strength Goal LTG- OT  Outcome: Ongoing (interventions implemented as appropriate)    10/09/17 0835   Strength OT LTG   Strength Goal OT LTG, Time to Achieve 2 wks   Strength Goal OT LTG, Functional Goal Pt will be min A with hand strengthening and coordination exercises by d/c to promote increased independence with daily tasks.    Strength Goal OT LTG, Outcome goal ongoing       Goal: UB Dressing Goal LTG- OT  Outcome: Ongoing (interventions implemented as appropriate)    10/09/17 0835   UB Dressing OT LTG   UB Dressing Goal OT LTG, Time to Achieve 2 wks   UB Dressing Goal OT LTG, [place] Level minimum assist (75% patient effort)   UB Dressing Goal OT LTG, Outcome goal ongoing              Electronically signed by JELENA Rueda at 10/9/2017  3:18 PM      Whitney Nails OTR at 10/9/2017  3:19 PM  Version 1 of 1         Acute Care - Occupational Therapy Initial Evaluation  [facility]     Patient Name: Richard Guevara  : 1960  MRN: [id number]  Today's Date: 10/9/2017  Onset of Illness/Injury or Date of Surgery Date: 10/08/17  Date of Referral to OT: 10/08/17  Referring Physician: Angel    Admit Date: 10/7/2017       ICD-10-CM ICD-9-CM   1. Rheumatoid arthritis flare M06.9 714.0   2. Tachycardia R00.0 785.0   3. Pyogenic  arthritis of multiple sites, due to unspecified organism M00.9 711.09   4. Impaired mobility and ADLs Z74.09 799.89   5. Impaired functional mobility, balance, gait, and endurance Z74.09 V49.89     Patient Active Problem List   Diagnosis   • Rheumatoid arthritis   • Gout   • Hypertension   • CAD (coronary artery disease)   • HLD (hyperlipidemia)   • Acute joint pain   • Joint erythema   • Altered mental status   • Immobility   • Weakness   • Rheumatoid arthritis flare   • Tachycardia   • Pyogenic arthritis of multiple sites     Past Medical History:   Diagnosis Date   • Altered mental status 10/8/2017   • CAD (coronary artery disease) 10/8/2017   • Erythematous condition 10/8/2017   • Gout 10/8/2017   • HLD (hyperlipidemia) 10/8/2017   • Hyperlipidemia    • Hypertension    • Immobility 10/8/2017   • Joint erythema 10/8/2017   • Joint pain 10/8/2017   • RA (rheumatoid arthritis)    • Rheumatoid arthritis 10/8/2017   • Tachycardia 10/8/2017   • Weakness 10/8/2017     Past Surgical History:   Procedure Laterality Date   • CORONARY ANGIOPLASTY WITH STENT PLACEMENT      x1, around 2007 edith   • KNEE ARTHROTOMY Left 10/8/2017    Procedure: KNEE ARTHROTOMY;  Surgeon: Addy Ortiz MD;  Location:  [facility];  Service:    • SKIN BIOPSY      hx a few suspicious spots removed, only required removal, unsure of result   • WRIST ARTHROTOMY Bilateral 10/8/2017    Procedure: WRIST ARTHROTOMY;  Surgeon: Addy Ortiz MD;  Location:  [facility];  Service:           OT ASSESSMENT FLOWSHEET (last 72 hours)      OT Evaluation       10/09/17 0850 10/09/17 0835 10/09/17 0800 10/09/17 0157 10/08/17 0723    Rehab Evaluation    Document Type evaluation  -EH evaluation;therapy note (daily note)  -ST       Subjective Information agree to therapy;complains of;pain;weakness  - no complaints;agree to therapy  -ST       Patient Effort, Rehab Treatment  good  -ST       Symptoms Noted During/After Treatment  fatigue;increased pain  -ST        Symptoms Noted Comment  RN notified   -       General Information    Patient Profile Review yes  - yes  -       Onset of Illness/Injury or Date of Surgery Date 10/08/17  - 10/07/17  -       Referring Physician Angel  - MD Angel  -       General Observations Pt supine in bed with HOB elevated, son in room.  - pt supine upon arrival; IV and 02 NC intact; son present; B ACE bandages to wrists  -       Pertinent History Of Current Problem Pt presents to hospital after recent bout of flu with hx of RA and c/o [place] wrist pain, and L knee pain. Found to have septic arthritis now s/p I &D of [place] wrists and L knee. ROM as tolerated, WBAT each joint.  - Pt presents with L knee pain and B wrist pain d/t septic arthritis. S/P exploration and drainage of L knee, arthrotomy of radiocarpal/midcarpal w/drainage and exploration B wrists; WBAT BUE's, LLE with ROM as tolerated all joints.   -       Precautions/Limitations fall precautions;other (see comments)   WBAT BUE, LLE, ROM as tolerated.  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  - independent:;all household mobility;community mobility;transfer;feeding;grooming;dressing;bathing;home management   prior to RA flair, pt independent, uses SPC occassionally   -       Equipment Currently Used at Home wheelchair;walker, standard   was not using prior  - wheelchair;cane, straight;walker, rolling  -ST  wheelchair;walker, standard  -     Plans/Goals Discussed With patient;agreed upon  - patient and family;agreed upon  -       Risks Reviewed patient:;LOB;increased discomfort  - patient and family:;LOB;nausea/vomiting;dizziness;increased discomfort;change in vital signs  -       Benefits Reviewed patient:;improve function;increase independence  - patient  and family:;improve function;increase independence;increase strength;increase balance;decrease pain;increase knowledge  -ST       Barriers to Rehab previous functional deficit;medically complex  - previous functional deficit;medically complex  -ST       Living Environment    Lives With other (see comments)   mother  - other (see comments)   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   -ST       Living Arrangements house  - house  -ST       Home Accessibility stairs to enter home;tub/shower is not walk in   walk in and tub shower; one level  - stairs to enter home;tub/shower is not walk in   also with walk-in shower   -       Number of Stairs to Enter Home 2  - 2  -ST       Stair Railings at Home none  -EH none  -ST       Transportation Available    car;family or friend will provide  -AD car;family or friend will provide  -AD    Living Environment Comment Pt retired; has falls; son visits often  - son comes by frequently, pt retired; has falls   -ST       Clinical Impression    Date of Referral to OT  10/08/17  -       OT Diagnosis  impaired ADLs  -ST       Prognosis  good  -ST       Patient/Family Goals Statement  return to [facility], dec. pain  -ST       Criteria for Skilled Therapeutic Interventions Met  yes  -ST       Rehab Potential  good, to achieve stated therapy goals  -ST       Therapy Frequency  daily  -ST       Anticipated Equipment Needs At Discharge  bedside commode   TBD further, has reacher  -ST       Anticipated Discharge Disposition  skilled nursing facility  -ST       Pain Assessment    Pain Assessment 0-10  - 0-10  -ST       Pain Score 8  -EH 8  -ST       Post Pain Score 8  -EH 8  -ST       Pain Type Acute pain  - Acute pain  -ST       Pain Location Knee   and L>R wrist  - Wrist  -ST       Pain Intervention(s) Repositioned;Ambulation/increased activity  - Repositioned  -ST       Response to Interventions tolerated  -        Vision Assessment/Intervention    Visual Impairment WFL  - WNL  -ST        Cognitive Assessment/Intervention    Current Cognitive/Communication Assessment functional  - functional  -ST       Orientation Status oriented x 4  -EH oriented x 4  -ST       Follows Commands/Answers Questions 100% of the time;able to follow single-step instructions  - 100% of the time  -       Personal Safety mild impairment;decreased awareness, need for assist;decreased awareness, need for safety  - mild impairment  -ST       Personal Safety Interventions fall prevention program maintained;gait belt;nonskid shoes/slippers when out of bed  - fall prevention program maintained;gait belt;nonskid shoes/slippers when out of bed  -ST       ROM (Range of Motion)    General ROM lower extremity range of motion deficits identified  - upper extremity range of motion deficits identified  -       General ROM Detail LLE knee flexion limited to ~45 degrees, extension limitied ~ 25 degrees.  -EH shoulder flexion/extension, elbow flexion/extension WFL, wrist deficits d/t ACE bandage, impaired digit ROM d/t RA & pain   -ST       MMT (Manual Muscle Testing)    General MMT Assessment  upper extremity strength deficits identified  -       General MMT Assessment Detail L Knee extension 1/5, knee flexion 3/5,DF 4/5. LLE knee extension 4/5, DF, PF 4/5.  - shoulder flexion and abd: 4/5; did not perform elbow MMT d/t pain, unable to perform wrist MMT d/t ACE bandages, unable to fully perform power grasp, lateral of pincer  tests d/t impaired ROM d/t ACE bandage and pain   -ST       Muscle Tone Assessment    Muscle Tone Assessment   Bilateral Upper Extremities  -MARIANA      Bilateral Upper Extremities Muscle Tone Assessment   rigidity  -MARIANA      Bed Mobility, Assessment/Treatment    Bed Mobility, Assistive Device  bed rails;head of bed elevated  -ST       Bed Mob, Supine to Sit, [place] minimum assist (75% patient effort);2 person assist required  - minimum assist (75% patient effort);2 person assist required   -ST       Bed Mob, Sit to Supine, [place] not tested  -        Bed Mobility, Comment  difficulty with movement of trunk to EOB d/t limited use of BUE's to WB through-good movement of BLE's to EOB  -ST       Transfer Assessment/Treatment    Transfers, Sit-Stand [place] moderate assist (50% patient effort);2 person assist required   + 1 to assist with stablization of Walker/ IV pole.  -EH moderate assist (50% patient effort);2 person assist required  -ST       Transfers, Stand-Sit [place] maximum assist (25% patient effort);2 person assist required   +3rd for equipment, pt son assists LLE to chair  - maximum assist (25% patient effort);2 person assist required  -ST       Transfers, Sit-Stand-Sit, Assist Device rolling platform walker;other (see comments)   KI  - rolling platform walker;other (see comments)  -ST       Transfer, Comment Pt performs sit to stand with bed elevated using platform walker to stand. Once standing pt instructed to WB through LLE, but RLE knee frantz with continued knee bend on LLE. Pt needs assist of PT/OT to maintain standing. Bed brought to pt to sit. 2nd attempt made with KI on RLE, platform walker and elevated bed surface. Pt able to stand with MAX Ax2, take steps toward chair with cues for upright posture, sequencing and MAX Ax2.  - Tech also assisting with blocking B feet and stabilizing rwx for safefy, assisting w/lines. Cuing for hand placement, WS'ing forward to aid with transfer independence   -ST       Functional Mobility    Functional Mobility- Ind. Level  maximum assist (25% patient effort);2 person assist required  -       Functional Mobility- Device  rolling platform walker  -ST       Functional Mobility-Distance (Feet)  8  -ST       Functional Mobility- Comment  from bed to chair, pt requiring cuing for UB posture and sequencing steps, pt requiring KI on RLE d/t buckling of R knee with inability to keep stabilized during steps; unable to fully extend  sx LE d/t weakness and pain, possible RA deformity   -       ADL Assessment/Intervention    Additional Documentation  Self-Feeding Assessment/Training (Group)  -       Lower Body Dressing Assessment/Training    LB Dressing Assess/Train, Clothing Type  doffing:;donning:;slipper socks  -       LB Dressing Assess/Train, Position  sitting  -       LB Dressing Assess/Train, [place]  dependent (less than 25% patient effort)  -       LB Dressing Assess/Train, Impairments  ROM decreased;sensation decreased  -       LB Dressing Assess/Train, Comment  unable to perform d/t dec. hand strength and ROM   -       Self-Feeding Assessment/Training    Self-Feeding Assess/Train, Comment  significant difficulty w/grasping utinsils d/t B swelling in digits along with weakness and dec. ROM; issued built-up utinsils to increase ease with grasp along w/adult sippy cup; these items improved self-feeding tasks, still requires set-up assist with opening containers and packages  -       Motor Skills/Interventions    Additional Documentation  Balance Skills Training (Group);Fine Motor Coordination Training (Group);Gross Motor Coordination Training (Group)  -       Balance Skills Training    Sitting-Level of Assistance  Close supervision  -       Sitting-Balance Support  Feet supported  -ST       Standing-Level of Assistance  Minimum assistance;x2  -ST       Static Standing Balance Support  assistive device  -ST       Gait Balance-Level of Assistance  Maximum assistance  -ST       Gait Balance Support  assistive device  -ST       Therapy Exercises    Bilateral Lower Extremities AROM:;10 reps;ankle pumps/circles;quad sets;glut sets;heel slides  -EH        Bilateral Upper Extremity  --   B yellow foam block power grasp squeezes   -       Gross Motor Coordination Training    Gross Motor Skill, Impairments Detail  rapid/alt: impaired bilaterally   -       Fine Motor Coordination Training    Opposition   Right:;Left:;impaired  -ST       Sensory Assessment/Intervention    Light Touch LLE;RLE  -EH LUE;RUE  -ST       LUE Light Touch  mild impairment  -ST       RUE Light Touch  mild impairment  -ST       LLE Light Touch WNL  -EH        RLE Light Touch mild impairment  -EH        General Therapy Interventions    Planned Therapy Interventions  activity intolerance;adaptive equipment training;ADL retraining;IADL retraining;balance training;bed mobility training;fine motor coordination training;home exercise program;ROM (Range of Motion);strengthening;stretching;transfer training  -       Adaptive Equipment Training  issued built up utinsils and adult sippy cup  -ST       Positioning and Restraints    Pre-Treatment Position in bed  - in bed  -ST       Post Treatment Position chair  - chair  -ST       In Chair notified nsg;reclined;call light within reach;encouraged to call for assist;exit alarm on;with OT;waffle cushion  - notified nsg;reclined;call light within reach;encouraged to call for assist;exit alarm on;with OT;waffle cushion  -ST         10/08/17 0238                General Information    Equipment Currently Used at Home wheelchair;walker, standard  -AD        Living Environment    Lives With parent(s)  -AD        Living Arrangements house  -AD        Home Accessibility no concerns  -AD        Stair Railings at Home none  -AD        Type of Financial/Environmental Concern none  -AD        Transportation Available car;family or friend will provide  -AD        Functional Level Prior    Ambulation 0-->independent  -AD        Transferring 0-->independent  -AD        Toileting 0-->independent  -AD        Bathing 0-->independent  -AD        Dressing 0-->independent  -AD        Eating 0-->independent  -AD        Communication 0-->understands/communicates without difficulty  -AD        Swallowing 0-->swallows foods/liquids without difficulty  -AD        Prior Functional Level Comment independent  -AD          User  Key  (r) = Recorded By, (t) = Taken By, (c) = Cosigned By    Initials Name Effective Dates     Dori DEVI Live, PT 06/19/15 -     ST Whitney THOMPSON Jaqui, OTR 02/20/17 -     MARIANA Schumacher, LPN 03/14/16 -     DEN Ramos, RN 06/21/17 -            Occupational Therapy Education     Title: PT OT SLP Therapies (Active)     Topic: Occupational Therapy (Done)     Point: ADL training (Done)    Description: Instruct learner(s) on proper safety adaptation and remediation techniques during self care or transfers.   Instruct in proper use of assistive devices.    Learning Progress Summary    Learner Readiness Method Response Comment Documented by Status   Patient Acceptance E,TB,D VU,DU role of OT, benefits of activity, HEP, AE, bed mobility, transfers ST 10/09/17 1459 Done               Point: Home exercise program (Done)    Description: Instruct learner(s) on appropriate technique for monitoring, assisting and/or progressing therapeutic exercises/activities.    Learning Progress Summary    Learner Readiness Method Response Comment Documented by Status   Patient Acceptance E,TB,D VU,DU role of OT, benefits of activity, HEP, AE, bed mobility, transfers ST 10/09/17 1459 Done               Point: Precautions (Done)    Description: Instruct learner(s) on prescribed precautions during self-care and functional transfers.    Learning Progress Summary    Learner Readiness Method Response Comment Documented by Status   Patient Acceptance E,TB,D VU,DU role of OT, benefits of activity, HEP, AE, bed mobility, transfers ST 10/09/17 1459 Done               Point: Body mechanics (Done)    Description: Instruct learner(s) on proper positioning and spine alignment during self-care, functional mobility activities and/or exercises.    Learning Progress Summary    Learner Readiness Method Response Comment Documented by Status   Patient Acceptance E,TB,D VU,DU role of OT, benefits of activity, HEP, AE, bed mobility, transfers   10/09/17 1459 Done                      User Key     Initials Effective Dates Name Provider Type Discipline     02/20/17 -  Whitney Nails OTR Occupational Therapist OT                  OT Recommendation and Plan  Anticipated Equipment Needs At Discharge: bedside commode (TBD further, has reacher)  Anticipated Discharge Disposition: skilled nursing facility  Planned Therapy Interventions: activity intolerance, adaptive equipment training, ADL retraining, IADL retraining, balance training, bed mobility training, fine motor coordination training, home exercise program, ROM (Range of Motion), strengthening, stretching, transfer training  Therapy Frequency: daily  Plan of Care Review  Plan Of Care Reviewed With: patient  Outcome Summary/Follow up Plan: Pt s/p drainage of L knee and bilateral wrists and now w/limited mobility and ROM impacting ADLs, transfers and mobility. Pt max X2 for transfers/mobility with platform rwx and required use of KI for RLE d/t buckling w/standing. Pt with significantly complicated issues impacting rehab progress this date & would benefit greatly from IP or SNF level rehab at d/c. Pt notes mult. falls at home.           OT Goals       10/09/17 0835          Bed Mobility OT LTG    Bed Mobility OT LTG, Time to Achieve 2 wks  -ST      Bed Mobility OT LTG, Activity Type supine to sit/sit to supine  -ST      Bed Mobility OT LTG, [place] Level contact guard assist  -ST      Bed Mobility OT LTG, Assist Device bed rails  -ST      Bed Mobility OT LTG, Outcome goal ongoing  -ST      Transfer Training OT LTG    Transfer Training OT LTG, Time to Achieve 2 wks  -ST      Transfer Training OT LTG, Activity Type toilet;sit to stand/stand to sit;bed to chair /chair to bed  -ST      Transfer Training OT LTG, [place] Level minimum assist (75% patient effort);2 person assist required  -ST      Transfer Training OT LTG, Assist Device walker, rolling platform  -ST      Transfer Training OT LTG,  Outcome goal ongoing  -ST      Strength OT LTG    Strength Goal OT LTG, Time to Achieve 2 wks  -ST      Strength Goal OT LTG, Functional Goal Pt will be min A with hand strengthening and coordination exercises by d/c to promote increased independence with daily tasks.   -ST      Strength Goal OT LTG, Outcome goal ongoing  -ST      UB Dressing OT LTG    UB Dressing Goal OT LTG, Time to Achieve 2 wks  -ST      UB Dressing Goal OT LTG, [place] Level minimum assist (75% patient effort)  -ST      UB Dressing Goal OT LTG, Outcome goal ongoing  -ST        User Key  (r) = Recorded By, (t) = Taken By, (c) = Cosigned By    Initials Name Provider Type    JELENA Ledesma Occupational Therapist                Outcome Measures       10/09/17 0850 10/09/17 0835       How much help from another person do you currently need...    Turning from your back to your side while in flat bed without using bedrails? 2  -EH      Moving from lying on back to sitting on the side of a flat bed without bedrails? 2  -EH      Moving to and from a bed to a chair (including a wheelchair)? 2  -EH      Standing up from a chair using your arms (e.g., wheelchair, bedside chair)? 2  -EH      Climbing 3-5 steps with a railing? 1  -EH      To walk in hospital room? 1  -EH      AM-PAC 6 Clicks Score 10  -EH      How much help from another is currently needed...    Putting on and taking off regular lower body clothing?  1  -ST     Bathing (including washing, rinsing, and drying)  1  -ST     Toileting (which includes using toilet bed pan or urinal)  1  -ST     Putting on and taking off regular upper body clothing  1  -ST     Taking care of personal grooming (such as brushing teeth)  2  -ST     Eating meals  3  -ST     Score  9  -ST     Functional Assessment    Outcome Measure Options AM-PAC 6 Clicks Basic Mobility (PT)  -EH AM-PAC 6 Clicks Daily Activity (OT)  -ST       User Key  (r) = Recorded By, (t) = Taken By, (c) = Cosigned By    Initials  Name Provider Type     Dori Mancini, PT Physical Therapist    ST Whitney Nails OTR Occupational Therapist          Time Calculation:   OT Start Time: 0835    Therapy Charges for Today     Code Description Service Date Service Provider Modifiers Qty    17610499395 HC OT THERAPEUTIC ACT EA 15 MIN 10/9/2017 JELENA Rueda GO 2    37500359161 HC OT EVAL MOD COMPLEXITY 3 10/9/2017 JELENA Rueda GO 1               JELENA Travis  10/9/2017     Electronically signed by JELENA Rueda at 10/9/2017  3:19 PM

## 2017-10-10 NOTE — PROGRESS NOTES
"  SUBJECTIVE  Patient states he is continuing to improve.  Pain in joints is much improved.  Joint range of motion in left knee and bilateral wrists has improved.    OBJECTIVE  Temp (24hrs), Av.8 °F (37.1 °C), Min:98.4 °F (36.9 °C), Max:99.5 °F (37.5 °C)    Blood pressure 122/77, pulse 93, temperature 98.4 °F (36.9 °C), temperature source Oral, resp. rate 20, height 72\" (182.9 cm), weight 231 lb 9.6 oz (105 kg), SpO2 95 %.    Lab Results (last 24 hours)     Procedure Component Value Units Date/Time    Basic Metabolic Panel [[id number]]  (Abnormal) Collected:  10/09/17 0550    Specimen:  Blood Updated:  10/09/17 0728     Glucose 79 mg/dL      BUN 11 mg/dL      Creatinine 0.70 mg/dL      Sodium 141 mmol/L      Potassium 3.3 (L) mmol/L      Chloride 107 mmol/L      CO2 22.0 mmol/L      Calcium 8.2 (L) mg/dL      eGFR Non African Amer 117 mL/min/1.73      BUN/Creatinine Ratio 15.7     Anion Gap 12.0 (H) mmol/L     Narrative:       National [employer] Guidelines    Stage     Description        GFR  1         Normal or High     90+  2         Mild decrease      60-89  3         Moderate decrease  30-59  4         Severe decrease    15-29  5         Kidney failure     <15    Magnesium [[id number]]  (Normal) Collected:  10/09/17 0550    Specimen:  Blood Updated:  10/09/17 0728     Magnesium 2.1 mg/dL     Body Fluid Culture - Synovial Fluid, Wrist, Right [[id number]]  (Abnormal) Collected:  10/08/17 0140    Specimen:  Synovial Fluid from Wrist, Right Updated:  10/09/17 1050     BF Culture --      Moderate growth (3+) Staphylococcus aureus (A)     Gram Stain Result Many (4+) WBCs seen      No organisms seen    Body Fluid Culture - Synovial Fluid, Wrist, Left [[id number]]  (Abnormal) Collected:  10/08/17 0100    Specimen:  Synovial Fluid from Wrist, Left Updated:  10/09/17 1051     BF Culture --      Heavy growth (4+) Staphylococcus aureus (A)     Gram Stain Result Many (4+) WBCs seen      No organisms seen    Body " Fluid Culture - Synovial Fluid, Knee, Left [[id number]]  (Abnormal) Collected:  10/08/17 0130    Specimen:  Synovial Fluid from Knee, Left Updated:  10/09/17 1052     BF Culture --      Moderate growth (3+) Staphylococcus aureus (A)     Gram Stain Result Many (4+) WBCs seen      No organisms seen    Vancomycin, Trough [[id number]]  (Normal) Collected:  10/09/17 1300    Specimen:  Blood Updated:  10/09/17 1356     Vancomycin Trough 11.90 mcg/mL     T3, Free [[id number]] Collected:  10/08/17 0515    Specimen:  Blood Updated:  10/09/17 1710     T3, Free 2.9 pg/mL     Narrative:       Performed at:  [address] [place] [address]  [id number]  : Ricci Bailey PhD, Phone:  [phone]    Blood Culture - Blood, [[id number]]  (Normal) Collected:  10/07/17 2141    Specimen:  Blood from Arm, Right Updated:  10/09/17 2201     Blood Culture No growth at 2 days    Blood Culture - Blood, [[id number]]  (Normal) Collected:  10/07/17 2141    Specimen:  Blood from Arm, Right Updated:  10/09/17 2201     Blood Culture No growth at 2 days    Potassium [[id number]]  (Normal) Collected:  10/09/17 2158    Specimen:  Blood Updated:  10/09/17 2237     Potassium 4.3 mmol/L       Result checked       Basic Metabolic Panel [[id number]]  (Abnormal) Collected:  10/10/17 0424    Specimen:  Blood Updated:  10/10/17 0622     Glucose 80 mg/dL      BUN 9 mg/dL      Creatinine 0.60 mg/dL      Sodium 141 mmol/L      Potassium 3.9 mmol/L      Chloride 114 (H) mmol/L      CO2 22.0 mmol/L      Calcium 7.7 (L) mg/dL      eGFR Non African Amer 139 mL/min/1.73      BUN/Creatinine Ratio 15.0     Anion Gap 5.0 mmol/L     Narrative:       National Kidney Foundation Guidelines    Stage     Description        GFR  1         Normal or High     90+  2         Mild decrease      60-89  3         Moderate decrease  30-59  4         Severe decrease    15-29  5         Kidney failure     <15            PHYSICAL EXAM  Bilateral upper  extremities: Dressings are clean, dry and intact.  Wrist range of motion improving.  Edema in wrist and hands improving.  Intact EPL, FPL, EDC, FDP, FDS, interosseous, wrist flexion, wrist extension, biceps, triceps, deltoid. Sensation intact light touch to median, radial, ulnar, and axillary nerves.  Capillary refill less than 2 seconds to all digits.     Left lower extremity:Dressing intact with spotty serosanguineous drainage.  Knee range of motion remains diminished but is improving.  Intact EHL, FHL, tibialis anterior, and gastrocsoleus. Sensation intact to light touch to deep peroneal, superficial peroneal, sural, saphenous, tibial nerves. 2+ palpable DP and PT pulses.      Principal Problem:    Pyogenic arthritis of multiple sites  Active Problems:    Rheumatoid arthritis    Gout    Hypertension    CAD (coronary artery disease)    HLD (hyperlipidemia)    Acute joint pain    Joint erythema    Altered mental status    Immobility    Weakness    Rheumatoid arthritis flare    Tachycardia      PLAN / DISPOSITION:  2 Day Post-Op bilateral wrist arthrotomies and left knee arthrotomy with irrigation and debridement for septic arthritis    Protected weight bearing as tolerated bilateral upper extremities, left lower extremity, knee and wrist range of motion as tolerated   Pain control  PT/OT for post op mobilization and medical equipment needs   Continue empiric antibiotic coverage.  Follow up intraoperative cultures - staph aureus, sensitivities pending.  Infectious disease for antibiotic recommendations  TEDs and SCD's bilateral lower extremities   DVT prophylaxis per primary team    Social work for discharge planning.   Dressing to remain in place for 7 days. May remove on POD#7. If no drainage, may shower on POD#10. No submerging wound in water. If drainage is noted, sterile dressing should be placed and wound checked daily. No showering until wound has remained dry for 72 consecutive hours.   Follow up in 2 weeks  for re-assessment.      Addy Ortiz MD  10/10/17  6:48 AM

## 2017-10-10 NOTE — PROGRESS NOTES
INFECTIOUS DISEASE CONSULT/INITIAL HOSPITAL VISIT    Richard Guevara  1960  [id number]    Date of Consult: 10/10/2017    Admission Date: 10/7/2017      Requesting Provider: No ref. provider found  Evaluating Physician: Quentin Nunes MD    Reason for Consultation: septic arthritis    History of present illness:    Patient is a 57 y.o. male with rheumatoid arthritis recently increased on dose of prednisone has experienced worsening pain in bilateral wrists and left knee over the last  4 weeks.  Patient developed redness on forearm and contacted pcp and was ultimately referred to [facility] ED.  Aspiration of wrists, and knees has revealed septic arthritis picture with high wbc, with neutrophlic predominance so patient taken to OR for wrist arthrotomies, left knee arthrotomy.    10/10/17; feels well no events overnight; no complaints, no diarrhea, rash, sore throat    Has less knee and wrist pain      Past Medical History:   Diagnosis Date   • Altered mental status 10/8/2017   • CAD (coronary artery disease) 10/8/2017   • Erythematous condition 10/8/2017   • Gout 10/8/2017   • HLD (hyperlipidemia) 10/8/2017   • Hyperlipidemia    • Hypertension    • Immobility 10/8/2017   • Joint erythema 10/8/2017   • Joint pain 10/8/2017   • RA (rheumatoid arthritis)    • Rheumatoid arthritis 10/8/2017   • Tachycardia 10/8/2017   • Weakness 10/8/2017       Past Surgical History:   Procedure Laterality Date   • CORONARY ANGIOPLASTY WITH STENT PLACEMENT      x1, around 2007 edith   • KNEE ARTHROTOMY Left 10/8/2017    Procedure: KNEE ARTHROTOMY;  Surgeon: Addy Ortiz MD;  Location:  [facility];  Service:    • SKIN BIOPSY      hx a few suspicious spots removed, only required removal, unsure of result   • WRIST ARTHROTOMY Bilateral 10/8/2017    Procedure: WRIST ARTHROTOMY;  Surgeon: Addy Ortiz MD;  Location:  [facility];  Service:        Family History   Problem Relation Age of Onset   • Osteoporosis Mother    • COPD Father    •  Rheum arthritis Father    • Arthritis Sister    • Osteoporosis Sister        Social History     Social History   • Marital status:      Spouse name: N/A   • Number of children: N/A   • Years of education: N/A     Occupational History   • Not on file.     Social History Main Topics   • Smoking status: Current Every Day Smoker     Packs/day: 1.00   • Smokeless tobacco: Never Used   • Alcohol use No   • Drug use: No   • Sexual activity: Defer     Other Topics Concern   • Not on file     Social History Narrative    Mr. Guevara is a 57 year old white  male. He has 2 children. He has been disabled from a younger age r/t advanced rheumatoid arthritis. He does not have an advanced directive.       No Known Allergies      Medication:    Current Facility-Administered Medications:   •  [START ON 10/11/2017] ! Vancomycin trough 10/11/17 at 1330, , Does not apply, Once, Fartun Dubois, [facility]  •  acetaminophen (TYLENOL) tablet 500 mg, 500 mg, Oral, TID PRN, Josey Fritz, APRN  •  acetaminophen (TYLENOL) tablet 650 mg, 650 mg, Oral, Q4H PRN, Addy Ortiz MD  •  amitriptyline (ELAVIL) tablet 75 mg, 75 mg, Oral, Nightly, Josey Fritz, APRN, 75 mg at 10/09/17 2039  •  aspirin chewable tablet 81 mg, 81 mg, Oral, Daily, Josey Fritz, APRN, 81 mg at 10/10/17 0806  •  atorvastatin (LIPITOR) tablet 40 mg, 40 mg, Oral, Nightly, Josey Fritz, APRN, 40 mg at 10/09/17 2041  •  bisacodyl (DULCOLAX) EC tablet 10 mg, 10 mg, Oral, Daily PRN, Addy Ortiz MD  •  bisacodyl (DULCOLAX) suppository 10 mg, 10 mg, Rectal, Daily PRN, Addy Ortiz MD  •  carvedilol (COREG) tablet 12.5 mg, 12.5 mg, Oral, Q12H, Josey Fritz APRN, 12.5 mg at 10/10/17 0806  •  cefTRIAXone (ROCEPHIN) IVPB 2 g, 2 g, Intravenous, Q24H, Quentin Nunes MD  •  cetirizine (zyrTEC) tablet 10 mg, 10 mg, Oral, Nightly, ZAC Foster, 10 mg at 10/09/17 2041  •  citalopram (CeleXA) tablet 20 mg, 20 mg,  Oral, Daily, Josey Fritz, APRN, 20 mg at 10/10/17 0806  •  clopidogrel (PLAVIX) tablet 75 mg, 75 mg, Oral, Daily, Josey Fritz, APRN, 75 mg at 10/10/17 0806  •  colchicine tablet 0.6 mg, 0.6 mg, Oral, Daily, Josey Fritz, APRN, 0.6 mg at 10/10/17 0806  •  diclofenac (VOLTAREN) 1 % gel 4 g, 4 g, Topical, 4x Daily PRN, Josey Fritz, APRN  •  diltiaZEM CD (CARDIZEM CD) 24 hr capsule 120 mg, 120 mg, Oral, Daily, Josey Fritz, APRN, 120 mg at 10/10/17 0806  •  docusate sodium (COLACE) capsule 100 mg, 100 mg, Oral, Daily, Josey Fritz, APRN, 100 mg at 10/10/17 0900  •  docusate sodium (COLACE) capsule 100 mg, 100 mg, Oral, BID PRN, Addy Ortiz MD  •  famotidine (PEPCID) tablet 20 mg, 20 mg, Oral, BID, Josey Fritz, APRN, 20 mg at 10/10/17 0806  •  HYDROmorphone (DILAUDID) injection 0.5 mg, 0.5 mg, Intravenous, Q2H PRN **AND** naloxone (NARCAN) injection 0.1 mg, 0.1 mg, Intravenous, Q5 Min PRN, Addy Ortiz MD  •  influenza vac split quad (FLUZONE,FLUARIX,AFLURIA) injection 0.5 mL, 0.5 mL, Intramuscular, During Hospitalization, Eleno Rea MD  •  ketorolac (TORADOL) injection 15 mg, 15 mg, Intravenous, Q6H, Josey Fritz APRN, 15 mg at 10/10/17 1244  •  Magnesium Sulfate 2 gram Bolus, followed by 8 gram infusion (total Mg dose 10 grams)- Mg less than or equal to 1mg/dL, 2 g, Intravenous, PRN **OR** Magnesium Sulfate 6 gram Infusion (2 gm x 3) -Mg 1.1 -1.5 mg/dL, 2 g, Intravenous, PRN **OR** magnesium sulfate 4 gram infusion- Mg 1.6-1.9 mg/dL, 4 g, Intravenous, PRN, ZAC Foster, Last Rate: 25 mL/hr at 10/08/17 1431, 4 g at 10/08/17 1431  •  melatonin sublingual tablet 5 mg, 5 mg, Sublingual, Nightly PRN, Josey Fritz APRN, 5 mg at 10/10/17 0033  •  ondansetron (ZOFRAN) injection 4 mg, 4 mg, Intravenous, Q6H PRN, Josey Fritz APRN  •  ondansetron (ZOFRAN) tablet 4 mg, 4 mg, Oral, Q6H PRN **OR** ondansetron (ZOFRAN)  injection 4 mg, 4 mg, Intravenous, Q6H PRN, Addy Ortiz MD  •  oxyCODONE (ROXICODONE) immediate release tablet 10 mg, 10 mg, Oral, Q4H PRN, ZAC Foster, 10 mg at 10/10/17 1130  •  oxyCODONE-acetaminophen (PERCOCET) 5-325 MG per tablet 1 tablet, 1 tablet, Oral, Q4H PRN, Addy Ortiz MD  •  oxyCODONE-acetaminophen (PERCOCET) 5-325 MG per tablet 2 tablet, 2 tablet, Oral, Q4H PRN, Addy Ortiz MD  •  pantoprazole (PROTONIX) EC tablet 40 mg, 40 mg, Oral, Q AM, ZAC Foster, 40 mg at 10/10/17 0622  •  Pharmacy to dose vancomycin, , Does not apply, Continuous PRN, ZAC Foster  •  potassium chloride (MICRO-K) CR capsule 40 mEq, 40 mEq, Oral, PRN, 40 mEq at 10/09/17 1654 **OR** potassium chloride (KLOR-CON) packet 40 mEq, 40 mEq, Oral, PRN **OR** potassium chloride 10 mEq in 100 mL IVPB, 10 mEq, Intravenous, Q1H PRN, ZAC Foster  •  sodium chloride 0.9 % flush 1-10 mL, 1-10 mL, Intravenous, PRN, ZAC Foster  •  sodium chloride 0.9 % flush 1-10 mL, 1-10 mL, Intravenous, PRN, Addy Ortiz MD  •  Insert peripheral IV, , , Once **AND** sodium chloride 0.9 % flush 10 mL, 10 mL, Intravenous, PRN, ZAC Beyer  •  sodium chloride 0.9 % infusion, 100 mL/hr, Intravenous, Continuous, ZAC Foster, Stopped at 10/08/17 1035  •  sodium chloride 0.9 % infusion, 100 mL/hr, Intravenous, Continuous, Addy Ortiz MD, Last Rate: 100 mL/hr at 10/10/17 0422, 100 mL/hr at 10/10/17 0422  •  sodium chloride 0.9 % infusion, 100 mL/hr, Intravenous, Continuous, Addy Ortiz MD, Last Rate: 100 mL/hr at 10/09/17 1215, 100 mL/hr at 10/09/17 1215  •  traMADol (ULTRAM) tablet 50 mg, 50 mg, Oral, 4x Daily PRN, ZAC Foster  •  vancomycin IVPB 1500 mg in 0.9% NaCl (Premix) 500 mL, 15 mg/kg, Intravenous, Q12H, Saurabh Ramirez [facility], 1,500 mg at 10/10/17 0207    Antibiotics:  IV Anti-Infectives     Ordered     Dose/Rate  Route Frequency Start Stop    10/09/17 1738  cefTRIAXone (ROCEPHIN) IVPB 2 g     Ordering Provider:  Quentin Nunes MD    2 g  over 30 Minutes Intravenous Every 24 Hours 10/09/17 1800      10/08/17 1408  ceFAZolin in dextrose (ANCEF) IVPB solution 2 g     Ordering Provider:  Addy Ortiz MD    2 g Intravenous Once 10/09/17 0600 10/09/17 0616    10/08/17 0438  vancomycin IVPB 1500 mg in 0.9% NaCl (Premix) 500 mL     Ordering Provider:  Saurabh Ramirez RPH    15 mg/kg × 101 kg  over 90 Minutes Intravenous Every 12 Hours 10/08/17 1400      10/08/17 0433  Pharmacy to dose vancomycin     Ordering Provider:  ZAC Foster     Does not apply Continuous PRN 10/08/17 0419      10/07/17 2346  vancomycin IVPB 2000 mg in 0.9% Sodium Chloride (premix) 500 mL     Ordering Provider:  ZAC Beyer    20 mg/kg × 95.3 kg Intravenous Once 10/07/17 2348 10/08/17 0311    10/07/17 2346  piperacillin-tazobactam (ZOSYN) 4.5 g/100 mL 0.9% NS IVPB (mbp)     Ordering Provider:  ZAC Beyer    4.5 g Intravenous Once 10/07/17 2348 10/08/17 0150            Review of Systems:  See hpi    Rest of ROS reviewed and were negative    Physical Exam:   Vital Signs  Temp (24hrs), Av.7 °F (37.1 °C), Min:98.2 °F (36.8 °C), Max:99.5 °F (37.5 °C)    Temp  Min: 98.2 °F (36.8 °C)  Max: 99.5 °F (37.5 °C)  BP  Min: 110/70  Max: 128/79  Pulse  Min: 91  Max: 97  Resp  Min: 18  Max: 20  SpO2  Min: 92 %  Max: 99 %    GENERAL: Awake and alert, in no acute distress.   HEENT: Normocephalic, atraumatic.  PERRL. EOMI. No conjunctival injection. No icterus. Oropharynx clear without evidence of thrush or exudate. No evidence of peridontal disease.      HEART: RRR; No murmur, rubs, gallops.   LUNGS: Clear to auscultation bilaterally without  wheezing, rales, rhonchi. Normal respiratory effort. Nonlabored. No dullness.  ABDOMEN: Soft, nontender, nondistended. Positive bowel sounds. No rebound or guarding. NO mass or  HSM.  EXT:  No cyanosis, clubbing or edema. No cord.    MSK: left knee wrapped, bilateral wrists wrapped  SKIN: Warm and dry without cutaneous eruptions on Inspection/palpation.    NEURO: Oriented to PPT. No focal deficits on motor/sensory exam at arms/legs.  PSYCHIATRIC: Normal insight and judgement. Cooperative with PE    Laboratory Data      Results from last 7 days  Lab Units 10/09/17  0550 10/08/17  1513 10/08/17  0515 10/07/17  2103   WBC 10*3/mm3  --   --  4.99 4.94   HEMOGLOBIN g/dL 12.0* 12.3* 12.8* 14.9   HEMATOCRIT % 36.7* 36.7* 38.0* 43.3   PLATELETS 10*3/mm3  --   --  308 347       Results from last 7 days  Lab Units 10/10/17  0424   SODIUM mmol/L 141   POTASSIUM mmol/L 3.9   CHLORIDE mmol/L 114*   CO2 mmol/L 22.0   BUN mg/dL 9   CREATININE mg/dL 0.60   GLUCOSE mg/dL 80   CALCIUM mg/dL 7.7*       Results from last 7 days  Lab Units 10/08/17  0515   ALK PHOS U/L 93   BILIRUBIN mg/dL 0.4   ALT (SGPT) U/L 92*   AST (SGOT) U/L 40*       Results from last 7 days  Lab Units 10/07/17  2103   SED RATE mm/hr 95*       Results from last 7 days  Lab Units 10/07/17  2133   CRP mg/dL 9.22*       Results from last 7 days  Lab Units 10/08/17  0515   LACTATE mmol/L 0.9           Results from last 7 days  Lab Units 10/09/17  1300   VANCOMYCIN TR mcg/mL 11.90     Estimated Creatinine Clearance: 172.3 mL/min (by C-G formula based on Cr of 0.6).      Microbiology:  Blood Culture   Date Value Ref Range Status   10/07/2017 No growth at 24 hours  Preliminary   10/07/2017 No growth at 24 hours  Preliminary                                Radiology:  Imaging Results (last 72 hours)     Procedure Component Value Units Date/Time    XR Chest 1 View [[id number]]  (Abnormal) Collected:  10/07/17 2111     Updated:  10/07/17 2239    Narrative:       EXAM:    XR Chest, 1 View    CLINICAL HISTORY:    57 years old, male; Pain; Chest pain; Type not specified; Additional info:   Redness and swelling of left forearm, Tachycardia, rheumatoid  arthritis    TECHNIQUE:    Frontal view of the chest.    COMPARISON:    [facility] 6/4/2014 12:51:11 PM    FINDINGS:    Lungs:  Unremarkable.  No consolidation.    Pleural space:  Unremarkable.  No pneumothorax.    Heart:  Unremarkable.  No cardiomegaly.    Mediastinum:  Unremarkable.    Bones/joints:  Old healed right posterior rib fractures.      Impression:         No acute abnormality.    THIS DOCUMENT HAS BEEN ELECTRONICALLY SIGNED BY ARMAND AMEZQUITA MD    XR Knee 1 or 2 View Left [[id number]]  (Abnormal) Collected:  10/08/17 0005     Updated:  10/08/17 0041    Narrative:       EXAM:    XR Left Knee, 1 or 2 views    CLINICAL HISTORY:    57 years old, male; Pain; Knee; Left; Additional info: Ra with swelling and   pain    TECHNIQUE:    Frontal and/or lateral views of the left knee.    COMPARISON:    No relevant prior studies available.    FINDINGS:    Bones/joints:  Severe tricompartmental joint space narrowing, worst   laterally.  No acute fracture.  No dislocation.    Soft tissues:  Soft tissue swelling at the knee.  Small suprapatellar   effusion.      Impression:       1.  Severe tricompartmental joint space narrowing, worst laterally.  2.  Soft tissue swelling at the knee.  Small suprapatellar effusion.    THIS DOCUMENT HAS BEEN ELECTRONICALLY SIGNED BY ARMAND AMEZQUITA MD    XR Wrist 3+ View Left [[id number]]  (Abnormal) Collected:  10/08/17 0005     Updated:  10/08/17 0048    Narrative:       EXAM:    XR Left Wrist Complete, 3 or More Views    CLINICAL HISTORY:    57 years old, male; Pain; Wrist; Left; Additional info: Ra with swelling and   pain    TECHNIQUE:    Frontal, lateral and oblique views of the left wrist.    COMPARISON:    No relevant prior studies available.    FINDINGS:    Bones/joints: No fracture. Severe degenerative changes consistent with   rheumatoid arthritis (worse on the left wrist) with mild palmar subluxation of   the carpal bones in relation to the radius and ulna.  Carpal  joint space   narrowing.  Severe radiocarpal joint space narrowing.  Second and third   metacarpophalangeal periarticular lucencies.  No acute fracture.    Soft tissues: Soft tissue swelling of the wrist.  No radiopaque foreign body.      Impression:         Severe degenerative changes consistent with rheumatoid arthritis (worse on   the left wrist) with mild palmar subluxation of the carpal bones in relation to   the radius and ulna.    THIS DOCUMENT HAS BEEN ELECTRONICALLY SIGNED BY NASIMA CLAROS MD    XR Wrist 3+ View Right [[id number]]  (Abnormal) Collected:  10/08/17 0005     Updated:  10/08/17 0049    Addenda:        IMPRESSION:       1.  No acute fracture.  2.  Degenerative changes of the right wrist consistent with rheumatoid   arthritis.    THIS DOCUMENT HAS BEEN ELECTRONICALLY SIGNED BY NASIMA CLAROS MD  Signed:  10/08/17 0049 by Nasima Claros MD    Narrative:       EXAM:    XR Right Wrist Complete, 3 or More Views    CLINICAL HISTORY:    57 years old, male; Pain; Wrist; Right; Additional info: Ra with swelling and   pain    TECHNIQUE:    Frontal, lateral and oblique views of the right wrist.    COMPARISON:    Left wrist radiograph same date    FINDINGS:    Bones/joints:  No acute fracture.  Radiocarpal joint space narrowing.    Ulnocarpal joint space narrowing.  Diffuse carpal joint space narrowing.    Carpal periarticular lucencies consistent with rheumatoid arthritis.  Fifth   carpometacarpal periarticular lucencies.  First and fifth metacarpophalangeal   periarticular lucencies.  No dislocation.    Soft tissues:  Unremarkable.  No radiopaque foreign body.      Impression:       1.  No acute fracture.  2.  Degenerative changes of the right wrist consistent with rheumatoid   arthritis.    THIS DOCUMENT HAS BEEN ELECTRONICALLY SIGNED BY NASIMA CLAROS MD    XR Chest 1 View [[id number]] Collected:  10/08/17 1547     Updated:  10/09/17 1053    Narrative:       EXAMINATION: XR CHEST 1 VW - 10/08/2017      INDICATION: M06.9-Rheumatoid arthritis, unspecified; R00.0-Tachycardia,  unspecified; M00.9-Pyogenic arthritis, unspecified.     COMPARISON: 10/07/2017.     FINDINGS: Portable chest reveals deep line catheter on the right with  tip in the SVC. Heart is borderline enlarged. Underlying chronic change  is seen within the lung fields. Healed fracture is seen of the right  posterior ribs. No pleural effusion or pneumothorax. Pulmonary  vascularity is within normal limits.        Impression:       Deep line catheter identified on the right with tip in the  SVC. No evidence of pneumothorax.     DICTATED:     10/08/2017  EDITED:         10/08/2017        This report was finalized on 10/9/2017 10:51 AM by Dr. Yue Fierro MD.               Impression:   Septic arthritis left knee, left, right wrist  S. Aureus disseminated infection  Rheumatoid arthritis  Immunocompromised host on chronic prednisone    PLAN/RECOMMENDATIONS:   Thank you for asking us to see Richard Guevara I recommend the following:  Cont vancomycin  F/u op cultures, bl cx  Cont  ctx 2 g iv daily    Needs 6 weeks of iv abx  Serial esr if not downtrending may require repeat washout of joints    D/w family    Quentin Nunes MD  10/10/2017  12:51 PM

## 2017-10-10 NOTE — NURSING NOTE
"Patient was laying on his right side when family pointed out a rash he had developed prior to admission. Patient states \" The steroids caused this.\" Rash is under both arms and on lower abdomen. He does not complain of itching or burning with the rash. Skin is warm and slightly rash. No odor associated with the rash. On patients left elbow os a pen sized sore that when the skin is pulled taunt, appears to have a \" hole\". Distal of this is a nickel sized \" nodule\" Patient says that is how the Rheumatoid \" sores\" begin. Once again there is no discomfort itching or draining of either of these. Small mepilex is used to cover the elbow nodule. Message left for the [facility] staff to call when able.   "

## 2017-10-10 NOTE — PROGRESS NOTES
Continued Stay Note  [facility]     Patient Name: Richard Guevara  MRN: [id number]  Today's Date: 10/10/2017    Admit Date: 10/7/2017          Discharge Plan       10/10/17 1413    Case Management/Social Work Plan    Plan SNF    Patient/Family In Agreement With Plan yes    Additional Comments I spoke with the pt and his daughter. The pt is agreeable to SNF plt at [facility]. He wants me to fax to [facility] and he will discuss other options with family. Info faxed to [facility]. I will f/u with the pt regardfing other SNF options.              Discharge Codes     None        Expected Discharge Date and Time     Expected Discharge Date Expected Discharge Time    Oct 11, 2017             Wnedie Ocampo RN

## 2017-10-10 NOTE — PLAN OF CARE
Problem: Patient Care Overview (Adult)  Goal: Plan of Care Review  Outcome: Ongoing (interventions implemented as appropriate)    10/10/17 0451   Coping/Psychosocial Response Interventions   Plan Of Care Reviewed With patient;daughter   Patient Care Overview   Progress no change   Outcome Evaluation   Outcome Summary/Follow up Plan VSS. Pt c/o pain twice during shift, which was somewhat relieved by PRN medication. Pt did not rest very well even when given PRN melatonin. Pt is using his hands to take medicine and drink water with minimal assistance.        Goal: Discharge Needs Assessment  Outcome: Ongoing (interventions implemented as appropriate)    10/09/17 1546 10/10/17 0451   Discharge Needs Assessment   Concerns To Be Addressed --  discharge planning concerns   Readmission Within The Last 30 Days --  no previous admission in last 30 days   Equipment Needed After Discharge --  none   Discharge Facility/Level Of Care Needs other (see comments);home with home health;nursing facility, skilled --    Discharge Disposition --  still a patient;home healthcare service;rehab facility   Current Health   Outpatient/Agency/Support Group Needs --  homecare agency (specify level of care)   Living Environment   Transportation Available --  car;family or friend will provide   Self-Care   Equipment Currently Used at Home wheelchair;walker, standard;cane, straight --          Problem: Pain, Acute (Adult)  Goal: Acceptable Pain Control/Comfort Level  Outcome: Ongoing (interventions implemented as appropriate)    10/10/17 0451   Pain, Acute (Adult)   Acceptable Pain Control/Comfort Level making progress toward outcome         Problem: Skin Integrity Impairment, Risk/Actual (Adult)  Goal: Skin Integrity/Wound Healing  Outcome: Ongoing (interventions implemented as appropriate)    10/10/17 0451   Skin Integrity Impairment, Risk/Actual (Adult)   Skin Integrity/Wound Healing making progress toward outcome

## 2017-10-11 VITALS
HEIGHT: 72 IN | WEIGHT: 229.6 LBS | RESPIRATION RATE: 18 BRPM | HEART RATE: 86 BPM | OXYGEN SATURATION: 97 % | TEMPERATURE: 97.7 F | BODY MASS INDEX: 31.1 KG/M2 | DIASTOLIC BLOOD PRESSURE: 83 MMHG | SYSTOLIC BLOOD PRESSURE: 133 MMHG

## 2017-10-11 PROBLEM — L02.91 ABSCESS: Status: ACTIVE | Noted: 2017-10-11

## 2017-10-11 PROBLEM — R00.0 TACHYCARDIA: Status: RESOLVED | Noted: 2017-10-08 | Resolved: 2017-10-11

## 2017-10-11 PROBLEM — M86.9 OSTEOMYELITIS OF LEFT TIBIA (HCC): Status: ACTIVE | Noted: 2017-10-11

## 2017-10-11 LAB
ANION GAP SERPL CALCULATED.3IONS-SCNC: 5 MMOL/L (ref 3–11)
BASOPHILS # BLD AUTO: 0.02 10*3/MM3 (ref 0–0.2)
BASOPHILS NFR BLD AUTO: 0.4 % (ref 0–1)
BUN BLD-MCNC: 6 MG/DL (ref 9–23)
BUN/CREAT SERPL: 12 (ref 7–25)
CA-I SERPL ISE-MCNC: 1.26 MMOL/L (ref 1.12–1.32)
CALCIUM SPEC-SCNC: 8 MG/DL (ref 8.7–10.4)
CHLORIDE SERPL-SCNC: 111 MMOL/L (ref 99–109)
CO2 SERPL-SCNC: 25 MMOL/L (ref 20–31)
CREAT BLD-MCNC: 0.5 MG/DL (ref 0.6–1.3)
DEPRECATED RDW RBC AUTO: 52.1 FL (ref 37–54)
EOSINOPHIL # BLD AUTO: 0.27 10*3/MM3 (ref 0–0.3)
EOSINOPHIL NFR BLD AUTO: 4.8 % (ref 0–3)
ERYTHROCYTE [DISTWIDTH] IN BLOOD BY AUTOMATED COUNT: 14.1 % (ref 11.3–14.5)
GFR SERPL CREATININE-BSD FRML MDRD: >150 ML/MIN/1.73
GLUCOSE BLD-MCNC: 73 MG/DL (ref 70–100)
HCT VFR BLD AUTO: 34.8 % (ref 38.9–50.9)
HGB BLD-MCNC: 11.5 G/DL (ref 13.1–17.5)
IMM GRANULOCYTES # BLD: 0.05 10*3/MM3 (ref 0–0.03)
IMM GRANULOCYTES NFR BLD: 0.9 % (ref 0–0.6)
LYMPHOCYTES # BLD AUTO: 1.66 10*3/MM3 (ref 0.6–4.8)
LYMPHOCYTES NFR BLD AUTO: 29.6 % (ref 24–44)
MCH RBC QN AUTO: 33.6 PG (ref 27–31)
MCHC RBC AUTO-ENTMCNC: 33 G/DL (ref 32–36)
MCV RBC AUTO: 101.8 FL (ref 80–99)
MONOCYTES # BLD AUTO: 0.6 10*3/MM3 (ref 0–1)
MONOCYTES NFR BLD AUTO: 10.7 % (ref 0–12)
NEUTROPHILS # BLD AUTO: 3 10*3/MM3 (ref 1.5–8.3)
NEUTROPHILS NFR BLD AUTO: 53.6 % (ref 41–71)
PLATELET # BLD AUTO: 283 10*3/MM3 (ref 150–450)
PMV BLD AUTO: 8.9 FL (ref 6–12)
POTASSIUM BLD-SCNC: 3.5 MMOL/L (ref 3.5–5.5)
RBC # BLD AUTO: 3.42 10*6/MM3 (ref 4.2–5.76)
SODIUM BLD-SCNC: 141 MMOL/L (ref 132–146)
VANCOMYCIN TROUGH SERPL-MCNC: 9.5 MCG/ML (ref 10–20)
WBC NRBC COR # BLD: 5.6 10*3/MM3 (ref 3.5–10.8)

## 2017-10-11 PROCEDURE — 82330 ASSAY OF CALCIUM: CPT | Performed by: HOSPITALIST

## 2017-10-11 PROCEDURE — 25010000002 METOCLOPRAMIDE PER 10 MG: Performed by: HOSPITALIST

## 2017-10-11 PROCEDURE — 25010000002 ONDANSETRON PER 1 MG: Performed by: ORTHOPAEDIC SURGERY

## 2017-10-11 PROCEDURE — 25010000002 HEPARIN (PORCINE) PER 1000 UNITS: Performed by: HOSPITALIST

## 2017-10-11 PROCEDURE — 97530 THERAPEUTIC ACTIVITIES: CPT | Performed by: OCCUPATIONAL THERAPIST

## 2017-10-11 PROCEDURE — 99024 POSTOP FOLLOW-UP VISIT: CPT | Performed by: ORTHOPAEDIC SURGERY

## 2017-10-11 PROCEDURE — 25010000002 VANCOMYCIN HCL IN NACL 1.5-0.9 GM/500ML-% SOLUTION

## 2017-10-11 PROCEDURE — 25010000002 VANCOMYCIN 10 G RECONSTITUTED SOLUTION

## 2017-10-11 PROCEDURE — 80048 BASIC METABOLIC PNL TOTAL CA: CPT | Performed by: HOSPITALIST

## 2017-10-11 PROCEDURE — 25010000002 KETOROLAC TROMETHAMINE PER 15 MG: Performed by: NURSE PRACTITIONER

## 2017-10-11 PROCEDURE — 80202 ASSAY OF VANCOMYCIN: CPT

## 2017-10-11 PROCEDURE — 97110 THERAPEUTIC EXERCISES: CPT

## 2017-10-11 PROCEDURE — 99239 HOSP IP/OBS DSCHRG MGMT >30: CPT | Performed by: HOSPITALIST

## 2017-10-11 PROCEDURE — 85025 COMPLETE CBC W/AUTO DIFF WBC: CPT | Performed by: HOSPITALIST

## 2017-10-11 RX ORDER — CARVEDILOL 12.5 MG/1
12.5 TABLET ORAL EVERY 12 HOURS SCHEDULED
Qty: 30 TABLET | Refills: 0 | Status: SHIPPED | OUTPATIENT
Start: 2017-10-11

## 2017-10-11 RX ORDER — CEFAZOLIN SODIUM 2 G/100ML
2 INJECTION, SOLUTION INTRAVENOUS EVERY 8 HOURS
Status: DISCONTINUED | OUTPATIENT
Start: 2017-10-11 | End: 2017-10-11

## 2017-10-11 RX ORDER — METOCLOPRAMIDE HYDROCHLORIDE 5 MG/ML
5 INJECTION INTRAMUSCULAR; INTRAVENOUS ONCE
Status: COMPLETED | OUTPATIENT
Start: 2017-10-11 | End: 2017-10-11

## 2017-10-11 RX ORDER — VANCOMYCIN HYDROCHLORIDE
1250 EVERY 8 HOURS SCHEDULED
Status: DISCONTINUED | OUTPATIENT
Start: 2017-10-11 | End: 2017-10-11

## 2017-10-11 RX ADMIN — CLOPIDOGREL BISULFATE 75 MG: 75 TABLET ORAL at 08:16

## 2017-10-11 RX ADMIN — VANCOMYCIN HCL-SODIUM CHLORIDE IV SOLN 1.5 GM/250ML-0.9% 1500 MG: 1.5-0.9/25 SOLUTION at 02:47

## 2017-10-11 RX ADMIN — OXYCODONE HYDROCHLORIDE 10 MG: 5 TABLET ORAL at 00:25

## 2017-10-11 RX ADMIN — DOCUSATE SODIUM 100 MG: 100 CAPSULE, LIQUID FILLED ORAL at 08:15

## 2017-10-11 RX ADMIN — KETOROLAC TROMETHAMINE 15 MG: 15 INJECTION, SOLUTION INTRAMUSCULAR; INTRAVENOUS at 11:23

## 2017-10-11 RX ADMIN — HEPARIN SODIUM 5000 UNITS: 5000 INJECTION, SOLUTION INTRAVENOUS; SUBCUTANEOUS at 05:24

## 2017-10-11 RX ADMIN — POTASSIUM CHLORIDE 40 MEQ: 750 CAPSULE, EXTENDED RELEASE ORAL at 08:16

## 2017-10-11 RX ADMIN — CARVEDILOL 12.5 MG: 12.5 TABLET, FILM COATED ORAL at 08:16

## 2017-10-11 RX ADMIN — COLCHICINE 0.6 MG: 0.6 TABLET, FILM COATED ORAL at 08:16

## 2017-10-11 RX ADMIN — ASPIRIN 81 MG 81 MG: 81 TABLET ORAL at 08:16

## 2017-10-11 RX ADMIN — ONDANSETRON 4 MG: 2 INJECTION INTRAMUSCULAR; INTRAVENOUS at 12:20

## 2017-10-11 RX ADMIN — KETOROLAC TROMETHAMINE 15 MG: 15 INJECTION, SOLUTION INTRAMUSCULAR; INTRAVENOUS at 05:24

## 2017-10-11 RX ADMIN — VANCOMYCIN HYDROCHLORIDE 1250 MG: 10 INJECTION, POWDER, LYOPHILIZED, FOR SOLUTION INTRAVENOUS at 14:46

## 2017-10-11 RX ADMIN — PANTOPRAZOLE SODIUM 40 MG: 40 TABLET, DELAYED RELEASE ORAL at 05:24

## 2017-10-11 RX ADMIN — FAMOTIDINE 20 MG: 20 TABLET ORAL at 08:15

## 2017-10-11 RX ADMIN — CITALOPRAM HYDROBROMIDE 20 MG: 20 TABLET ORAL at 08:16

## 2017-10-11 RX ADMIN — NIFEDIPINE 120 MG: 10 CAPSULE ORAL at 08:15

## 2017-10-11 RX ADMIN — OXYCODONE AND ACETAMINOPHEN 2 TABLET: 5; 325 TABLET ORAL at 14:19

## 2017-10-11 RX ADMIN — METOCLOPRAMIDE 5 MG: 5 INJECTION, SOLUTION INTRAMUSCULAR; INTRAVENOUS at 14:19

## 2017-10-11 RX ADMIN — HEPARIN SODIUM 5000 UNITS: 5000 INJECTION, SOLUTION INTRAVENOUS; SUBCUTANEOUS at 16:34

## 2017-10-11 NOTE — PLAN OF CARE
Problem: Pain, Acute (Adult)  Goal: Acceptable Pain Control/Comfort Level  Outcome: Ongoing (interventions implemented as appropriate)    10/11/17 9949   Pain, Acute (Adult)   Acceptable Pain Control/Comfort Level making progress toward outcome

## 2017-10-11 NOTE — PROGRESS NOTES
Continued Stay Note  [facility]     Patient Name: Richard Guevara  MRN: [id number]  Today's Date: 10/11/2017    Admit Date: 10/7/2017          Discharge Plan       10/11/17 1617    Final Note    Final Note [facility] ambulance [phone]. PCS on [facility].      10/11/17 3822    Case Management/Social Work Plan    Plan [facility]    Patient/Family In Agreement With Plan yes    Additional Comments I received a call from Dr Martin. She has arranged transfer to [facility] today. The pt is to be sent to the  ER where Dr Duncan will see the pt and arrange admission. [facility] ambulance to transport at 1600. The pt and family are in agreement.              Discharge Codes     None        Expected Discharge Date and Time     Expected Discharge Date Expected Discharge Time    Oct 11, 2017             Wendie Ocampo RN

## 2017-10-11 NOTE — PROGRESS NOTES
INFECTIOUS DISEASE CONSULT/INITIAL HOSPITAL VISIT    Richard Guevara  1960  [id number]    Date of Consult: 10/11/2017    Admission Date: 10/7/2017      Requesting Provider: No ref. provider found  Evaluating Physician: Quentin Nunes MD    Reason for Consultation: septic arthritis    History of present illness:    Patient is a 57 y.o. male with rheumatoid arthritis recently increased on dose of prednisone has experienced worsening pain in bilateral wrists and left knee over the last  4 weeks.  Patient developed redness on forearm and contacted pcp and was ultimately referred to [facility] ED.  Aspiration of wrists, and knees has revealed septic arthritis picture with high wbc, with neutrophlic predominance so patient taken to OR for wrist arthrotomies, left knee arthrotomy.    10/10/17; feels well no events overnight; no complaints, no diarrhea, rash, sore throat    10/11/17; no events overnight; no complaints, no diarrhea, rash, sore throat  Has less knee and wrist pain      Past Medical History:   Diagnosis Date   • Altered mental status 10/8/2017   • CAD (coronary artery disease) 10/8/2017   • Erythematous condition 10/8/2017   • Gout 10/8/2017   • HLD (hyperlipidemia) 10/8/2017   • Hyperlipidemia    • Hypertension    • Immobility 10/8/2017   • Joint erythema 10/8/2017   • Joint pain 10/8/2017   • RA (rheumatoid arthritis)    • Rheumatoid arthritis 10/8/2017   • Tachycardia 10/8/2017   • Weakness 10/8/2017       Past Surgical History:   Procedure Laterality Date   • CORONARY ANGIOPLASTY WITH STENT PLACEMENT      x1, around 2007 edith   • KNEE ARTHROTOMY Left 10/8/2017    Procedure: KNEE ARTHROTOMY;  Surgeon: Addy Ortiz MD;  Location:  [facility];  Service:    • SKIN BIOPSY      hx a few suspicious spots removed, only required removal, unsure of result   • WRIST ARTHROTOMY Bilateral 10/8/2017    Procedure: WRIST ARTHROTOMY;  Surgeon: Addy Ortiz MD;  Location:  [facility];  Service:        Family History    Problem Relation Age of Onset   • Osteoporosis Mother    • COPD Father    • Rheum arthritis Father    • Arthritis Sister    • Osteoporosis Sister        Social History     Social History   • Marital status:      Spouse name: N/A   • Number of children: N/A   • Years of education: N/A     Occupational History   • Not on file.     Social History Main Topics   • Smoking status: Current Every Day Smoker     Packs/day: 1.00   • Smokeless tobacco: Never Used   • Alcohol use No   • Drug use: No   • Sexual activity: Defer     Other Topics Concern   • Not on file     Social History Narrative    Mr. Guevara is a 57 year old white  male. He has 2 children. He has been disabled from a younger age r/t advanced rheumatoid arthritis. He does not have an advanced directive.       No Known Allergies      Medication:    Current Facility-Administered Medications:   •  acetaminophen (TYLENOL) tablet 650 mg, 650 mg, Oral, Q4H PRN, Addy Ortiz MD  •  amitriptyline (ELAVIL) tablet 75 mg, 75 mg, Oral, Nightly, Josey Fritz, APRN, 75 mg at 10/10/17 2005  •  aspirin chewable tablet 81 mg, 81 mg, Oral, Daily, Josey Fritz, APRN, 81 mg at 10/11/17 0816  •  atorvastatin (LIPITOR) tablet 40 mg, 40 mg, Oral, Nightly, Joesy Fritz, APRN, 40 mg at 10/10/17 2005  •  bisacodyl (DULCOLAX) EC tablet 10 mg, 10 mg, Oral, Daily PRN, Addy Ortiz MD  •  bisacodyl (DULCOLAX) suppository 10 mg, 10 mg, Rectal, Daily PRN, Addy Ortiz MD  •  carvedilol (COREG) tablet 12.5 mg, 12.5 mg, Oral, Q12H, Josey Fritz, APRN, 12.5 mg at 10/11/17 0816  •  cefTRIAXone (ROCEPHIN) IVPB 2 g, 2 g, Intravenous, Q24H, Quentin Nunes MD  •  cetirizine (zyrTEC) tablet 10 mg, 10 mg, Oral, Nightly, Josey Fritz, APRN, 10 mg at 10/10/17 2005  •  citalopram (CeleXA) tablet 20 mg, 20 mg, Oral, Daily, ZAC Foster, 20 mg at 10/11/17 0816  •  clopidogrel (PLAVIX) tablet 75 mg, 75 mg, Oral, Daily,  Josey Fritz APRN, 75 mg at 10/11/17 0816  •  colchicine tablet 0.6 mg, 0.6 mg, Oral, Daily, Josey Fritz APRN, 0.6 mg at 10/11/17 0816  •  diclofenac (VOLTAREN) 1 % gel 4 g, 4 g, Topical, 4x Daily PRN, Josey Fritz APRN  •  diltiaZEM CD (CARDIZEM CD) 24 hr capsule 120 mg, 120 mg, Oral, Daily, Josey Fritz APRN, 120 mg at 10/11/17 0815  •  docusate sodium (COLACE) capsule 100 mg, 100 mg, Oral, Daily, Josey Fritz APRN, 100 mg at 10/11/17 0815  •  docusate sodium (COLACE) capsule 100 mg, 100 mg, Oral, BID PRN, Addy Ortiz MD  •  famotidine (PEPCID) tablet 20 mg, 20 mg, Oral, BID, Josey Fritz APRN, 20 mg at 10/11/17 0815  •  heparin (porcine) 5000 UNIT/ML injection 5,000 Units, 5,000 Units, Subcutaneous, Q8H, Loren Martin MD, 5,000 Units at 10/11/17 0524  •  influenza vac split quad (FLUZONE,FLUARIX,AFLURIA) injection 0.5 mL, 0.5 mL, Intramuscular, During Hospitalization, Eleno Rea MD  •  ketorolac (TORADOL) injection 15 mg, 15 mg, Intravenous, Q6H, ZAC Foster, 15 mg at 10/11/17 1123  •  Magnesium Sulfate 2 gram Bolus, followed by 8 gram infusion (total Mg dose 10 grams)- Mg less than or equal to 1mg/dL, 2 g, Intravenous, PRN **OR** Magnesium Sulfate 6 gram Infusion (2 gm x 3) -Mg 1.1 -1.5 mg/dL, 2 g, Intravenous, PRN **OR** magnesium sulfate 4 gram infusion- Mg 1.6-1.9 mg/dL, 4 g, Intravenous, PRN, Josey Fritz APRN, Last Rate: 25 mL/hr at 10/08/17 1431, 4 g at 10/08/17 1431  •  melatonin sublingual tablet 5 mg, 5 mg, Sublingual, Nightly PRN, ZAC Foster, 5 mg at 10/10/17 0033  •  ondansetron (ZOFRAN) tablet 4 mg, 4 mg, Oral, Q6H PRN **OR** ondansetron (ZOFRAN) injection 4 mg, 4 mg, Intravenous, Q6H PRN, Addy Ortiz MD, 4 mg at 10/11/17 1220  •  oxyCODONE (ROXICODONE) immediate release tablet 10 mg, 10 mg, Oral, Q4H PRN, ZAC Foster, 10 mg at 10/11/17 0025  •  pantoprazole (PROTONIX) EC  tablet 40 mg, 40 mg, Oral, Q AM, Josey Fritz APRN, 40 mg at 10/11/17 0524  •  potassium chloride (MICRO-K) CR capsule 40 mEq, 40 mEq, Oral, PRN, 40 mEq at 10/11/17 0816 **OR** potassium chloride (KLOR-CON) packet 40 mEq, 40 mEq, Oral, PRN **OR** potassium chloride 10 mEq in 100 mL IVPB, 10 mEq, Intravenous, Q1H PRN, ZAC Foster  •  sodium chloride 0.9 % flush 1-10 mL, 1-10 mL, Intravenous, PRN, Josey Fritz APRN  •  sodium chloride 0.9 % flush 1-10 mL, 1-10 mL, Intravenous, PRN, Addy Ortiz MD  •  Insert peripheral IV, , , Once **AND** sodium chloride 0.9 % flush 10 mL, 10 mL, Intravenous, PRN, ZAC Beyer  •  sodium chloride 0.9 % flush 10 mL, 10 mL, Intracatheter, PRN, Loren Martin MD  •  sodium chloride 0.9 % infusion, 100 mL/hr, Intravenous, Continuous, Addy Ortiz MD, Last Rate: 100 mL/hr at 10/10/17 1314, 100 mL/hr at 10/10/17 1314  •  traMADol (ULTRAM) tablet 50 mg, 50 mg, Oral, 4x Daily PRN, ZAC Foster    Antibiotics:  IV Anti-Infectives     Ordered     Dose/Rate Route Frequency Start Stop    10/09/17 1738  cefTRIAXone (ROCEPHIN) IVPB 2 g     Ordering Provider:  Quentin Nunes MD    2 g  over 30 Minutes Intravenous Every 24 Hours 10/09/17 1800      10/08/17 1408  ceFAZolin in dextrose (ANCEF) IVPB solution 2 g     Ordering Provider:  Addy Ortiz MD    2 g Intravenous Once 10/09/17 0600 10/09/17 0616    10/07/17 2346  vancomycin IVPB 2000 mg in 0.9% Sodium Chloride (premix) 500 mL     Ordering Provider:  ZAC Beyer    20 mg/kg × 95.3 kg Intravenous Once 10/07/17 2348 10/08/17 0311    10/07/17 2346  piperacillin-tazobactam (ZOSYN) 4.5 g/100 mL 0.9% NS IVPB (mbp)     Ordering Provider:  ZAC Beyer    4.5 g Intravenous Once 10/07/17 2348 10/08/17 0150            Review of Systems:  See hpi    Rest of ROS reviewed and were negative    Physical Exam:   Vital Signs  Temp (24hrs), Av.9 °F  (36.6 °C), Min:97.7 °F (36.5 °C), Max:98.1 °F (36.7 °C)    Temp  Min: 97.7 °F (36.5 °C)  Max: 98.1 °F (36.7 °C)  BP  Min: 133/83  Max: 145/87  Pulse  Min: 83  Max: 97  Resp  Min: 18  Max: 18  SpO2  Min: 97 %  Max: 98 %    GENERAL: Awake and alert, in no acute distress.   HEENT: Normocephalic, atraumatic.  PERRL. EOMI. No conjunctival injection. No icterus. Oropharynx clear without evidence of thrush or exudate. No evidence of peridontal disease.      HEART: RRR; No murmur, rubs, gallops.   LUNGS: Clear to auscultation bilaterally without  wheezing, rales, rhonchi. Normal respiratory effort. Nonlabored. No dullness.  ABDOMEN: Soft, nontender, nondistended. Positive bowel sounds. No rebound or guarding. NO mass or HSM.  EXT:  No cyanosis, clubbing or edema. No cord.    MSK: left knee wrapped, bilateral wrists wrapped  SKIN: Warm and dry without cutaneous eruptions on Inspection/palpation.    NEURO: Oriented to PPT. No focal deficits on motor/sensory exam at arms/legs.  PSYCHIATRIC: Normal insight and judgement. Cooperative with PE    Laboratory Data      Results from last 7 days  Lab Units 10/11/17  0659 10/09/17  0550 10/08/17  1513 10/08/17  0515 10/07/17  2103   WBC 10*3/mm3 5.60  --   --  4.99 4.94   HEMOGLOBIN g/dL 11.5* 12.0* 12.3* 12.8* 14.9   HEMATOCRIT % 34.8* 36.7* 36.7* 38.0* 43.3   PLATELETS 10*3/mm3 283  --   --  308 347       Results from last 7 days  Lab Units 10/11/17  0659   SODIUM mmol/L 141   POTASSIUM mmol/L 3.5   CHLORIDE mmol/L 111*   CO2 mmol/L 25.0   BUN mg/dL 6*   CREATININE mg/dL 0.50*   GLUCOSE mg/dL 73   CALCIUM mg/dL 8.0*       Results from last 7 days  Lab Units 10/08/17  0515   ALK PHOS U/L 93   BILIRUBIN mg/dL 0.4   ALT (SGPT) U/L 92*   AST (SGOT) U/L 40*       Results from last 7 days  Lab Units 10/07/17  2103   SED RATE mm/hr 95*       Results from last 7 days  Lab Units 10/07/17  2133   CRP mg/dL 9.22*       Results from last 7 days  Lab Units 10/08/17  0515   LACTATE mmol/L 0.9            Results from last 7 days  Lab Units 10/11/17  1304 10/09/17  1300   VANCOMYCIN TR mcg/mL 9.50* 11.90     Estimated Creatinine Clearance: 205.8 mL/min (by C-G formula based on Cr of 0.5).      Microbiology:  Blood Culture   Date Value Ref Range Status   10/07/2017 No growth at 24 hours  Preliminary   10/07/2017 No growth at 24 hours  Preliminary                                Radiology:  Imaging Results (last 72 hours)     Procedure Component Value Units Date/Time    XR Chest 1 View [[id number]]  (Abnormal) Collected:  10/07/17 2111     Updated:  10/07/17 2239    Narrative:       EXAM:    XR Chest, 1 View    CLINICAL HISTORY:    57 years old, male; Pain; Chest pain; Type not specified; Additional info:   Redness and swelling of left forearm, Tachycardia, rheumatoid arthritis    TECHNIQUE:    Frontal view of the chest.    COMPARISON:    [facility] 6/4/2014 12:51:11 PM    FINDINGS:    Lungs:  Unremarkable.  No consolidation.    Pleural space:  Unremarkable.  No pneumothorax.    Heart:  Unremarkable.  No cardiomegaly.    Mediastinum:  Unremarkable.    Bones/joints:  Old healed right posterior rib fractures.      Impression:         No acute abnormality.    THIS DOCUMENT HAS BEEN ELECTRONICALLY SIGNED BY ARMAND AMEZQUITA MD    XR Knee 1 or 2 View Left [[id number]]  (Abnormal) Collected:  10/08/17 0005     Updated:  10/08/17 0041    Narrative:       EXAM:    XR Left Knee, 1 or 2 views    CLINICAL HISTORY:    57 years old, male; Pain; Knee; Left; Additional info: Ra with swelling and   pain    TECHNIQUE:    Frontal and/or lateral views of the left knee.    COMPARISON:    No relevant prior studies available.    FINDINGS:    Bones/joints:  Severe tricompartmental joint space narrowing, worst   laterally.  No acute fracture.  No dislocation.    Soft tissues:  Soft tissue swelling at the knee.  Small suprapatellar   effusion.      Impression:       1.  Severe tricompartmental joint space narrowing, worst  laterally.  2.  Soft tissue swelling at the knee.  Small suprapatellar effusion.    THIS DOCUMENT HAS BEEN ELECTRONICALLY SIGNED BY NASIMA CLAROS MD    XR Wrist 3+ View Left [[id number]]  (Abnormal) Collected:  10/08/17 0005     Updated:  10/08/17 0048    Narrative:       EXAM:    XR Left Wrist Complete, 3 or More Views    CLINICAL HISTORY:    57 years old, male; Pain; Wrist; Left; Additional info: Ra with swelling and   pain    TECHNIQUE:    Frontal, lateral and oblique views of the left wrist.    COMPARISON:    No relevant prior studies available.    FINDINGS:    Bones/joints: No fracture. Severe degenerative changes consistent with   rheumatoid arthritis (worse on the left wrist) with mild palmar subluxation of   the carpal bones in relation to the radius and ulna.  Carpal joint space   narrowing.  Severe radiocarpal joint space narrowing.  Second and third   metacarpophalangeal periarticular lucencies.  No acute fracture.    Soft tissues: Soft tissue swelling of the wrist.  No radiopaque foreign body.      Impression:         Severe degenerative changes consistent with rheumatoid arthritis (worse on   the left wrist) with mild palmar subluxation of the carpal bones in relation to   the radius and ulna.    THIS DOCUMENT HAS BEEN ELECTRONICALLY SIGNED BY NASIMA CLAROS MD    XR Wrist 3+ View Right [[id number]]  (Abnormal) Collected:  10/08/17 0005     Updated:  10/08/17 0049    Addenda:        IMPRESSION:       1.  No acute fracture.  2.  Degenerative changes of the right wrist consistent with rheumatoid   arthritis.    THIS DOCUMENT HAS BEEN ELECTRONICALLY SIGNED BY NASIMA CLAROS MD  Signed:  10/08/17 0049 by Nasima Claros MD    Narrative:       EXAM:    XR Right Wrist Complete, 3 or More Views    CLINICAL HISTORY:    57 years old, male; Pain; Wrist; Right; Additional info: Ra with swelling and   pain    TECHNIQUE:    Frontal, lateral and oblique views of the right wrist.    COMPARISON:    Left wrist radiograph same  date    FINDINGS:    Bones/joints:  No acute fracture.  Radiocarpal joint space narrowing.    Ulnocarpal joint space narrowing.  Diffuse carpal joint space narrowing.    Carpal periarticular lucencies consistent with rheumatoid arthritis.  Fifth   carpometacarpal periarticular lucencies.  First and fifth metacarpophalangeal   periarticular lucencies.  No dislocation.    Soft tissues:  Unremarkable.  No radiopaque foreign body.      Impression:       1.  No acute fracture.  2.  Degenerative changes of the right wrist consistent with rheumatoid   arthritis.    THIS DOCUMENT HAS BEEN ELECTRONICALLY SIGNED BY ARMAND AMEZQUITA MD    XR Chest 1 View [[id number]] Collected:  10/08/17 1547     Updated:  10/09/17 1053    Narrative:       EXAMINATION: XR CHEST 1 VW - 10/08/2017     INDICATION: M06.9-Rheumatoid arthritis, unspecified; R00.0-Tachycardia,  unspecified; M00.9-Pyogenic arthritis, unspecified.     COMPARISON: 10/07/2017.     FINDINGS: Portable chest reveals deep line catheter on the right with  tip in the SVC. Heart is borderline enlarged. Underlying chronic change  is seen within the lung fields. Healed fracture is seen of the right  posterior ribs. No pleural effusion or pneumothorax. Pulmonary  vascularity is within normal limits.        Impression:       Deep line catheter identified on the right with tip in the  SVC. No evidence of pneumothorax.     DICTATED:     10/08/2017  EDITED:         10/08/2017        This report was finalized on 10/9/2017 10:51 AM by Dr. Yue Fierro MD.               Impression:   Septic arthritis left knee, left, right wrist  MSSA  disseminated infection  Rheumatoid arthritis  Immunocompromised host on chronic prednisone    PLAN/RECOMMENDATIONS:   Thank you for asking us to see Richard Guevara I recommend the following:  D/c vancomycin  Change ctx to cefazolin 2 g iv q 8h    CT with multiloculated abscess;  Orthopedics reluctant to washout again apparently because of close proximity  to neurovascular bundle          Patient being transferred to [facility] for second opinion regarding surgical debridement    D/w family

## 2017-10-11 NOTE — THERAPY TREATMENT NOTE
Acute Care - Physical Therapy Treatment Note  [facility]     Patient Name: Richard Guevara  : 1960  MRN: [id number]  Today's Date: 10/11/2017  Onset of Illness/Injury or Date of Surgery Date: 10/08/17  Date of Referral to PT: 10/08/17  Referring Physician: Angel    Admit Date: 10/7/2017    Visit Dx:    ICD-10-CM ICD-9-CM   1. Rheumatoid arthritis flare M06.9 714.0   2. Tachycardia R00.0 785.0   3. Pyogenic arthritis of multiple sites, due to unspecified organism M00.9 711.09   4. Impaired mobility and ADLs Z74.09 799.89   5. Impaired functional mobility, balance, gait, and endurance Z74.09 V49.89     Patient Active Problem List   Diagnosis   • Rheumatoid arthritis   • Gout   • Hypertension   • CAD (coronary artery disease)   • HLD (hyperlipidemia)   • Acute joint pain   • Joint erythema   • Altered mental status   • Immobility   • Weakness   • Rheumatoid arthritis flare   • Tachycardia   • Pyogenic arthritis of multiple sites               Adult Rehabilitation Note       10/11/17 1120          Rehab Assessment/Intervention    Discipline physical therapy assistant  -UD      Document Type therapy note (daily note)  -UD      Subjective Information agree to therapy;complains of;pain  -UD      Symptoms Noted During/After Treatment fatigue;increased pain  -UD      Precautions/Limitations fall precautions  -UD      Recorded by [UD] Claudia Caruso PTA      Vital Signs    O2 Delivery Post Treatment room air  -UD      Pre Patient Position Supine  -UD      Intra Patient Position Standing  -UD      Post Patient Position Sitting  -UD      Recorded by [UD] Claudia Caruso PTA      Pain Assessment    Pain Assessment 0-10  -UD      Pain Score 5  -UD      Post Pain Score 5  -UD      Pain Type Acute pain  -UD      Pain Location Knee  -UD      Pain Intervention(s) Repositioned  -UD      Recorded by [UD] Claudia Caruso PTA      Cognitive Assessment/Intervention    Orientation Status oriented x 4  -UD      Follows Commands/Answers  Questions 100% of the time  -UD      Personal Safety Interventions fall prevention program maintained  -UD      Recorded by [UD] Claudia Caruso PTA      Bed Mobility, Assessment/Treatment    Bed Mob, Supine to Sit, [place] supervision required  -UD      Recorded by [UD] Claudia Caruso PTA      Transfer Assessment/Treatment    Transfers, Sit-Stand [place] minimum assist (75% patient effort)  -UD      Transfers, Stand-Sit [place] minimum assist (75% patient effort)  -UD      Recorded by [UD] Claudia Caruso PTA      Gait Assessment/Treatment    Gait, [place] Level other (see comments)   to chair only  -UD      Recorded by [UD] Claudia Caruso PTA      Therapy Exercises    Bilateral Lower Extremities AROM:;10 reps;sitting  -UD      Recorded by [UD] Claudia Caruso PTA      Positioning and Restraints    Pre-Treatment Position in bed  -UD      Post Treatment Position chair  -UD      In Chair notified nsg;sitting;call light within reach  -UD      Recorded by [UD] Claudia Caruso PTA        User Key  (r) = Recorded By, (t) = Taken By, (c) = Cosigned By    Initials Name Effective Dates    UD Claudia Caruso PTA 06/22/15 -                 IP PT Goals       10/11/17 1241 10/09/17 1323       Bed Mobility PT LTG    Bed Mobility PT LTG, Date Established  10/09/17  -     Bed Mobility PT LTG, Time to Achieve  2 wks  -EH     Bed Mobility PT LTG, Activity Type  supine to sit/sit to supine  -     Bed Mobility PT LTG, [place] Level  conditional independence  -EH     Bed Mobility PT LTG, Outcome goal met  -UD      Transfer Training PT LTG    Transfer Training PT LTG, Date Established  10/09/17  -     Transfer Training PT LTG, Time to Achieve  2 wks  -EH     Transfer Training PT LTG, Activity Type  sit to stand/stand to sit  -     Transfer Training PT LTG, [place] Level  minimum assist (75% patient effort)  -     Transfer Training PT LTG, Assist Device  walker, platform  -     Transfer Training PT LTG,  Outcome goal met  -UD      Gait Training PT LTG    Gait Training Goal PT LTG, Date Established  10/09/17  -     Gait Training Goal PT LTG, Time to Achieve  2 wks  -     Gait Training Goal PT LTG, [place] Level  moderate assist (50% patient effort)  -     Gait Training Goal PT LTG, Assist Device  walker, rolling platform  -     Gait Training Goal PT LTG, Outcome goal ongoing  -      Stair Training PT LTG    Stair Training Goal PT LTG, Date Established  10/09/17  -     Stair Training Goal PT LTG, Time to Achieve  2 wks  -     Stair Training Goal PT LTG, Number of Steps  2  -     Stair Training Goal PT LTG, [place] Level  moderate assist (50% patient effort);2 person assist required  -     Stair Training Goal PT LTG, Assist Device  2 handrails;walker, platform  -     Stair Training Goal PT LTG, Outcome goal ongoing  -        User Key  (r) = Recorded By, (t) = Taken By, (c) = Cosigned By    Initials Name Provider Type    TREMAINE Caruso, PTA Physical Therapy Assistant     Dori Mancini, PT Physical Therapist          Physical Therapy Education     Title: PT OT SLP Therapies (Active)     Topic: Physical Therapy (Active)     Point: Mobility training (Active)    Learning Progress Summary    Learner Readiness Method Response Comment Documented by Status   Patient Acceptance ANDRA ROBB,CODY   10/11/17 1240 Done    Acceptance E [facility] 10/09/17 1323 Active   Family Acceptance E [facility] 10/09/17 1323 Active               Point: Home exercise program (Active)    Learning Progress Summary    Learner Readiness Method Response Comment Documented by Status   Patient Acceptance ANDRA ROBB NR   10/11/17 1240 Done    Acceptance E [facility] 10/09/17 1323 Active   Family Acceptance E [facility] 10/09/17 1323 Active               Point: Body mechanics (Active)    Learning Progress Summary    Learner Readiness Method Response Comment Documented by Status   Patient Acceptance ANDRA ROBB,CODY   10/11/17 1240 Done     Acceptance E [facility] 10/09/17 1323 Active   Family Acceptance E [facility] 10/09/17 1323 Active               Point: Precautions (Active)    Learning Progress Summary    Learner Readiness Method Response Comment Documented by Status   Patient Acceptance ANDRA ROBB NR   10/11/17 1240 Done    Acceptance E [facility] 10/09/17 1323 Active   Family Acceptance E [facility] 10/09/17 1323 Active                      User Key     Initials Effective Dates Name Provider Type Discipline     06/22/15 -  Claudia Caruso, PTA Physical Therapy Assistant PT     06/19/15 -  Dori Mancini, PT Physical Therapist PT                    PT Recommendation and Plan  Anticipated Equipment Needs At Discharge: bedside commode  Anticipated Discharge Disposition: inpatient rehabilitation facility  PT Frequency: daily  Plan of Care Review  Plan Of Care Reviewed With: patient  Progress: progress towards functional goals is fair  Outcome Summary/Follow up Plan: pt limited by pain in rt knee and arms.transfers  with minim. assist to chair.rt knee gives out          Outcome Measures       10/11/17 1120 10/09/17 0850 10/09/17 0835    How much help from another person do you currently need...    Turning from your back to your side while in flat bed without using bedrails? 3  -UD 2  -EH     Moving from lying on back to sitting on the side of a flat bed without bedrails? 4  -UD 2  -EH     Moving to and from a bed to a chair (including a wheelchair)? 3  -UD 2  -EH     Standing up from a chair using your arms (e.g., wheelchair, bedside chair)? 2  -UD 2  -EH     Climbing 3-5 steps with a railing? 1  -UD 1  -EH     To walk in hospital room? 1  -UD 1  -EH     AM-PAC 6 Clicks Score 14  -UD 10  -EH     How much help from another is currently needed...    Putting on and taking off regular lower body clothing?   1  -ST    Bathing (including washing, rinsing, and drying)   1  -ST    Toileting (which includes using toilet bed pan or urinal)   1  -ST    Putting on and taking  off regular upper body clothing   1  -ST    Taking care of personal grooming (such as brushing teeth)   2  -ST    Eating meals   3  -ST    Score   9  -ST    Functional Assessment    Outcome Measure Options  AM-PAC 6 Clicks Basic Mobility (PT)  - AM-PAC 6 Clicks Daily Activity (OT)  -ST      User Key  (r) = Recorded By, (t) = Taken By, (c) = Cosigned By    Initials Name Provider Type    TREMAINE Caruso PTA Physical Therapy Assistant     Dori Mancini, PT Physical Therapist     Whitney Nails, OTR Occupational Therapist           Time Calculation:         PT Charges       10/11/17 1243          Time Calculation    PT Received On 10/11/17  -UD      PT Goal Re-Cert Due Date 10/19/17  -UD      Time Calculation- PT    Total Timed Code Minutes- PT 15 minute(s)  -        User Key  (r) = Recorded By, (t) = Taken By, (c) = Cosigned By    Initials Name Provider Type    TREMAINE Caruso PTA Physical Therapy Assistant          Therapy Charges for Today     Code Description Service Date Service Provider Modifiers Qty    [id number] HC PT THER PROC EA 15 MIN 10/11/2017 Claudia Caruso PTA GP 1          PT G-Codes  Outcome Measure Options: AM-PAC 6 Clicks Basic Mobility (PT)    Claudia Caruso PTA  10/11/2017

## 2017-10-11 NOTE — PLAN OF CARE
Problem: Patient Care Overview (Adult)  Goal: Plan of Care Review  Outcome: Ongoing (interventions implemented as appropriate)    10/11/17 0931   Coping/Psychosocial Response Interventions   Plan Of Care Reviewed With patient   Patient Care Overview   Progress no change   Outcome Evaluation   Outcome Summary/Follow up Plan Patient seen for left elbow wound that the patient states is a healing abscess. This wound has rolled wound edges and has mostly reepithelialized with a small area of 100% granulation in the center of the wound. This wound was covered with silver gel and a nonadherent dressing. He also has a rash on his abdomen and the axilla area, which appears to be a heat rash and does not bother him. Pt given hydraguard cream for this area. WOCN will s/o. Reconsult for further concerns.

## 2017-10-11 NOTE — PROGRESS NOTES
"  SUBJECTIVE  Patient states he is continuing to improve.  Joint range of motion in left knee and bilateral wrists has continued to improved.    OBJECTIVE  Temp (24hrs), Av.2 °F (36.8 °C), Min:98.1 °F (36.7 °C), Max:98.2 °F (36.8 °C)    Blood pressure 138/88, pulse 92, temperature 98.1 °F (36.7 °C), temperature source Oral, resp. rate 18, height 72\" (182.9 cm), weight 229 lb 9.6 oz (104 kg), SpO2 97 %.    Lab Results (last 24 hours)     Procedure Component Value Units Date/Time    Body Fluid Culture - Synovial Fluid, Knee, Left [[id number]]  (Abnormal)  (Susceptibility) Collected:  10/08/17 0130    Specimen:  Synovial Fluid from Knee, Left Updated:  10/10/17 1423     BF Culture --      Moderate growth (3+) Staphylococcus aureus (A)     Gram Stain Result Many (4+) WBCs seen      No organisms seen    Susceptibility      Staphylococcus aureus     IVAN (Preliminary)     Ceftriaxone <=8 ug/ml Susceptible     Clindamycin <=0.5 ug/ml Susceptible     Daptomycin <=0.5 ug/ml Susceptible     Erythromycin <=0.5 ug/ml Susceptible     Gentamicin <=4 ug/ml Susceptible     Levofloxacin <=1 ug/ml Susceptible  [1]      Linezolid 2 ug/ml Susceptible     Oxacillin <=0.25 ug/ml Susceptible     Penicillin G Resistant     Quinupristin + Dalfopristin <=0.5 ug/ml Susceptible     Rifampin <=1 ug/ml Susceptible     Tetracycline <=4 ug/ml Susceptible     Trimethoprim + Sulfamethoxazole <=0.5/9.5 ug/ml Susceptible     Vancomycin 2 ug/ml Susceptible            [1]   Staphylococcus species may develop resistance during prolonged therapy with quinolones.  Isolates that are initially susceptible may become resistant within three to four days after initiation of therapy. Testing of repeat isolates may be warranted.                 Body Fluid Culture - Synovial Fluid, Wrist, Left [[id number]]  (Abnormal)  (Susceptibility) Collected:  10/08/17 0100    Specimen:  Synovial Fluid from Wrist, Left Updated:  10/10/17 1425     BF Culture --      Heavy " growth (4+) Staphylococcus aureus (A)     Gram Stain Result Many (4+) WBCs seen      No organisms seen    Susceptibility      Staphylococcus aureus     IVAN (Preliminary)     Ceftriaxone <=8 ug/ml Susceptible     Clindamycin <=0.5 ug/ml Susceptible     Daptomycin <=0.5 ug/ml Susceptible     Erythromycin <=0.5 ug/ml Susceptible     Gentamicin <=4 ug/ml Susceptible     Levofloxacin <=1 ug/ml Susceptible  [1]      Linezolid 2 ug/ml Susceptible     Oxacillin <=0.25 ug/ml Susceptible     Penicillin G Resistant     Quinupristin + Dalfopristin <=0.5 ug/ml Susceptible     Rifampin <=1 ug/ml Susceptible     Tetracycline <=4 ug/ml Susceptible     Trimethoprim + Sulfamethoxazole <=0.5/9.5 ug/ml Susceptible     Vancomycin 2 ug/ml Susceptible            [1]   Staphylococcus species may develop resistance during prolonged therapy with quinolones.  Isolates that are initially susceptible may become resistant within three to four days after initiation of therapy. Testing of repeat isolates may be warranted.                 Blood Culture - Blood, [[id number]]  (Normal) Collected:  10/07/17 2141    Specimen:  Blood from Arm, Right Updated:  10/10/17 2201     Blood Culture No growth at 3 days    Blood Culture - Blood, [[id number]]  (Normal) Collected:  10/07/17 2141    Specimen:  Blood from Arm, Right Updated:  10/10/17 2201     Blood Culture No growth at 3 days            PHYSICAL EXAM  Bilateral upper extremities: Dressings are clean, dry and intact.  Wrist range of motion improving.  Edema in wrist and hands improving.  Intact EPL, FPL, EDC, FDP, FDS, interosseous, wrist flexion, wrist extension, biceps, triceps, deltoid. Sensation intact light touch to median, radial, ulnar, and axillary nerves.  Capillary refill less than 2 seconds to all digits.     Left lower extremity:Dressing intact with spotty serosanguineous drainage.  Knee range of motion remains diminished but is improving.  Intact EHL, FHL, tibialis anterior, and  gastrocsoleus. Sensation intact to light touch to deep peroneal, superficial peroneal, sural, saphenous, tibial nerves. 2+ palpable DP and PT pulses.      Principal Problem:    Pyogenic arthritis of multiple sites  Active Problems:    Rheumatoid arthritis    Gout    Hypertension    CAD (coronary artery disease)    HLD (hyperlipidemia)    Acute joint pain    Joint erythema    Altered mental status    Immobility    Weakness    Rheumatoid arthritis flare    Tachycardia      PLAN / DISPOSITION:  3 Day Post-Op bilateral wrist arthrotomies and left knee arthrotomy with irrigation and debridement for septic arthritis    Protected weight bearing as tolerated bilateral upper extremities, left lower extremity, knee and wrist range of motion as tolerated   Pain control  PT/OT for post op mobilization and medical equipment needs   Follow up intraoperative cultures - MSSA  Infectious disease for antibiotic recommendations  TEDs and SCD's bilateral lower extremities   DVT prophylaxis per primary team    Social work for discharge planning.   Dressing to remain in place for 7 days. May remove on POD#7. If no drainage, may shower on POD#10. No submerging wound in water. If drainage is noted, sterile dressing should be placed and wound checked daily. No showering until wound has remained dry for 72 consecutive hours.   Follow up in 2 weeks for re-assessment.      Addy Ortiz MD  10/11/17  6:47 AM

## 2017-10-11 NOTE — PLAN OF CARE
Problem: Patient Care Overview (Adult)  Goal: Plan of Care Review  Outcome: Ongoing (interventions implemented as appropriate)    10/11/17 1241   Coping/Psychosocial Response Interventions   Plan Of Care Reviewed With patient   Patient Care Overview   Progress progress towards functional goals is fair   Outcome Evaluation   Outcome Summary/Follow up Plan pt limited by pain in rt knee and arms.transfers with minim. assist to chair.rt knee gives out         Problem: Inpatient Physical Therapy  Goal: Bed Mobility Goal LTG- PT  Outcome: Outcome(s) achieved Date Met:  10/11/17    10/09/17 1323 10/11/17 1241   Bed Mobility PT LTG   Bed Mobility PT LTG, Date Established 10/09/17 --    Bed Mobility PT LTG, Time to Achieve 2 wks --    Bed Mobility PT LTG, Activity Type supine to sit/sit to supine --    Bed Mobility PT LTG, [place] Level conditional independence --    Bed Mobility PT LTG, Outcome --  goal met       Goal: Transfer Training Goal 1 LTG- PT  Outcome: Outcome(s) achieved Date Met:  10/11/17    10/09/17 1323 10/11/17 1241   Transfer Training PT LTG   Transfer Training PT LTG, Date Established 10/09/17 --    Transfer Training PT LTG, Time to Achieve 2 wks --    Transfer Training PT LTG, Activity Type sit to stand/stand to sit --    Transfer Training PT LTG, [place] Level minimum assist (75% patient effort) --    Transfer Training PT LTG, Assist Device walker, platform --    Transfer Training PT LTG, Outcome --  goal met       Goal: Gait Training Goal LTG- PT  Outcome: Ongoing (interventions implemented as appropriate)    10/09/17 1323 10/11/17 1241   Gait Training PT LTG   Gait Training Goal PT LTG, Date Established 10/09/17 --    Gait Training Goal PT LTG, Time to Achieve 2 wks --    Gait Training Goal PT LTG, [place] Level moderate assist (50% patient effort) --    Gait Training Goal PT LTG, Assist Device walker, rolling platform --    Gait Training Goal PT LTG, Outcome --  goal ongoing       Goal:  Stair Training Goal LTG- PT  Outcome: Ongoing (interventions implemented as appropriate)    10/09/17 1323 10/11/17 1241   Stair Training PT LTG   Stair Training Goal PT LTG, Date Established 10/09/17 --    Stair Training Goal PT LTG, Time to Achieve 2 wks --    Stair Training Goal PT LTG, Number of Steps 2 --    Stair Training Goal PT LTG, [place] Level moderate assist (50% patient effort);2 person assist required --    Stair Training Goal PT LTG, Assist Device 2 handrails;walker, platform --    Stair Training Goal PT LTG, Outcome --  goal ongoing

## 2017-10-11 NOTE — PLAN OF CARE
Problem: Patient Care Overview (Adult)  Goal: Plan of Care Review  Outcome: Ongoing (interventions implemented as appropriate)    10/11/17 1446   Coping/Psychosocial Response Interventions   Plan Of Care Reviewed With patient;daughter   Patient Care Overview   Progress progress towards functional goals is fair   Outcome Evaluation   Outcome Summary/Follow up Plan Pt limited with pain in BUE/BLE as well as weakness, Pt performed pivot-transfers with mod assist x 2 . Pt particiapted in A/AAROM BUE. Recommend IP rehab.          Problem: Inpatient Occupational Therapy  Goal: Bed Mobility Goal LTG- OT  Outcome: Ongoing (interventions implemented as appropriate)    10/09/17 0835 10/11/17 1446   Bed Mobility OT LTG   Bed Mobility OT LTG, Time to Achieve 2 wks --    Bed Mobility OT LTG, Activity Type supine to sit/sit to supine --    Bed Mobility OT LTG, [place] Level contact guard assist --    Bed Mobility OT LTG, Assist Device bed rails --    Bed Mobility OT LTG, Outcome --  goal ongoing       Goal: Transfer Training Goal 1 LTG- OT  Outcome: Ongoing (interventions implemented as appropriate)    10/09/17 0835 10/11/17 1446   Transfer Training OT LTG   Transfer Training OT LTG, Time to Achieve 2 wks --    Transfer Training OT LTG, Activity Type toilet;sit to stand/stand to sit;bed to chair /chair to bed --    Transfer Training OT LTG, [place] Level minimum assist (75% patient effort);2 person assist required --    Transfer Training OT LTG, Assist Device walker, rolling platform --    Transfer Training OT LTG, Outcome --  goal ongoing       Goal: Strength Goal LTG- OT  Outcome: Ongoing (interventions implemented as appropriate)    10/09/17 0835 10/11/17 1446   Strength OT LTG   Strength Goal OT LTG, Time to Achieve 2 wks --    Strength Goal OT LTG, Functional Goal Pt will be min A with hand strengthening and coordination exercises by d/c to promote increased independence with daily tasks.  --    Strength Goal OT  LTG, Outcome --  goal ongoing       Goal: UB Dressing Goal LTG- OT  Outcome: Ongoing (interventions implemented as appropriate)    10/09/17 0835 10/11/17 1446   UB Dressing OT LTG   UB Dressing Goal OT LTG, Time to Achieve 2 wks --    UB Dressing Goal OT LTG, [place] Level minimum assist (75% patient effort) --    UB Dressing Goal OT LTG, Outcome --  goal ongoing

## 2017-10-11 NOTE — DISCHARGE SUMMARY
[facility] Medicine Services  DISCHARGE SUMMARY       Date of Admission: 10/7/2017  Date of Discharge:  10/11/2017  Primary Care Physician: Tanner Santiago MD  Consulting Physician(s)     Provider Relationship Specialty    Quentin Nunes MD Consulting Physician Infectious Diseases    Addy Ortiz MD Consulting Physician Orthopedic Surgery          Discharge Diagnoses:  Active Hospital Problems (** Indicates Principal Problem)    Diagnosis Date Noted   • **Pyogenic arthritis of multiple sites [M00.9] 10/07/2017     Added automatically from request for surgery [id number]     • Abscess [L02.91] 10/11/2017   • Osteomyelitis of left tibia [M86.9] 10/11/2017   • Rheumatoid arthritis [M06.9] 10/08/2017   • Gout [M10.9] 10/08/2017   • Hypertension [I10] 10/08/2017   • CAD (coronary artery disease) [I25.10] 10/08/2017   • HLD (hyperlipidemia) [E78.5] 10/08/2017   • Acute joint pain [M25.50] 10/08/2017   • Joint erythema [L53.9] 10/08/2017   • Altered mental status [R41.82] 10/08/2017   • Immobility [Z74.09] 10/08/2017   • Weakness [R53.1] 10/08/2017      Resolved Hospital Problems    Diagnosis Date Noted Date Resolved   • Tachycardia [R00.0] 10/08/2017 10/11/2017       Presenting Problem/History of Present Illness  Rheumatoid arthritis flare [M06.9]  Septic arthritis [M00.9]     Chief Complaint on Day of Discharge:   None    History of Present Illness on Day of Discharge:     Pt has no complaints and reported that he feels better. Daughter at bedside reported that pt has been anxious and agitated. He wants to home and is reluctant about transfer to [facility] for higher level of care, but agrees to it.    Hospital Course  Mr. Guevara is an obese 57 year old M with history of CAD, s/p stent x 1 (data deficient), HTN, HLD, and immunosuppression due to RA maintained on Prednisone, Actemra, and MTX, who reported flu like illness about 5-6 weeks ago, at which time he stopped his MTX for a few week  without seeing any medical care providers. He started to noticed some joint swelling a few week ago and resumed all of his medications again. His steroid dose was increased from 5mg to 10mg without improvement, so he presented to our ED on 10/7/17 for further evaluation. On presentation, patient was afebrile, but tachycardic.  He was noted to have moderate swelling of bilateral wrist joints and left knee. He did not have leukocytosis. BMP was also unremarkable. He was started on Zosyn/Vanc in the ED and admitted to te Medicine's service for further evaluation. He was seen by Dr. Arenas from orthopedic surgery, who performed an irrigation and debridement of bilateral wrist and L knee on 10/8/17. He was seen by Dr. Nunes from infectious disease and antibiotics was changed to Vanc and Rocephin. His wound culture was + MSSA. He remained hemodynamically stable and afebrile with I&D and antibiotics, but was found to have a mass on the raissa-medial aspect of distal L thigh during a venous duplex, which ruled out DVT. Further evaluation with CT scan showed multiple loculated fluid collections involving the L knee joint and deep tissues of the L thigh, which extended significantly proximally. I discussed the case with Dr. Arenas and Dr. Sarmiento, our general surgeon, who did not feel comfortable with performing further debridement here, so they recommended transfer to [facility] for higher level of care. I spoke with Dr. Duncan through the OR nurse, who has graciously accepted patient in transfer. He has asked us to send patient to their ED for evaluation first. Currently Patient has already received Rocephin 2g and 1500mg Vanc. Copies of imaging studies and associated reports, as well as pertinent lab report will be sent with patient. Of note, patient had been on Plavix (coronary stent x 1 in 2007-edith?) and Heparin sq here for DVT prophylaxis, both have been stopped today in anticipation for poss surgery at [facility].      Procedures  Performed  Procedure(s):  WRIST ARTHROTOMY  KNEE ARTHROTOMY       Consults:   Consults     Date and Time Order Name Status Description    10/8/2017 1408 Inpatient Consult to Infectious Diseases Completed     10/8/2017 0001 IP Consult to Orthopedic Surgery Completed           Pertinent Test Results:  Lab Results (most recent)     Procedure Component Value Units Date/Time    CBC & Differential [[id number]] Collected:  10/07/17 2103    Specimen:  Blood Updated:  10/07/17 2114    Narrative:       The following orders were created for panel order CBC & Differential.  Procedure                               Abnormality         Status                     ---------                               -----------         ------                     CBC Auto Differential[[id number]]        Abnormal            Final result                 Please view results for these tests on the individual orders.    CBC Auto Differential [[id number]]  (Abnormal) Collected:  10/07/17 2103    Specimen:  Blood Updated:  10/07/17 2114     WBC 4.94 10*3/mm3      RBC 4.31 10*6/mm3      Hemoglobin 14.9 g/dL      Hematocrit 43.3 %      .5 (H) fL      MCH 34.6 (H) pg      MCHC 34.4 g/dL      RDW 13.9 %      RDW-SD 50.6 fl      MPV 9.4 fL      Platelets 347 10*3/mm3      Neutrophil % 51.8 %      Lymphocyte % 30.8 %      Monocyte % 14.4 (H) %      Eosinophil % 1.8 %      Basophil % 0.6 %      Immature Grans % 0.6 %      Neutrophils, Absolute 2.56 10*3/mm3      Lymphocytes, Absolute 1.52 10*3/mm3      Monocytes, Absolute 0.71 10*3/mm3      Eosinophils, Absolute 0.09 10*3/mm3      Basophils, Absolute 0.03 10*3/mm3      Immature Grans, Absolute 0.03 10*3/mm3      nRBC 0.0 /100 WBC     Sedimentation Rate [[id number]]  (Abnormal) Collected:  10/07/17 2103    Specimen:  Blood Updated:  10/07/17 2118     Sed Rate 95 (H) mm/hr     aPTT [[id number]]  (Normal) Collected:  10/07/17 2103    Specimen:  Blood Updated:  10/07/17 2127     PTT 26.1 seconds     Narrative:        PTT = The equivalent PTT values for the therapeutic range of heparin levels at 0.3 to 0.5 U/ml are 45 to 60 seconds.    Protime-INR [[id number]]  (Abnormal) Collected:  10/07/17 2103    Specimen:  Blood Updated:  10/07/17 2127     Protime 12.0 (H) Seconds      INR 1.10    Narrative:       Therapeutic Ranges for INR: 2.0-3.0 (PT 20-30)                              2.5-3.5 (PT 25-34)    POC Troponin, Rapid [[id number]]  (Normal) Collected:  10/07/17 2115    Specimen:  Blood Updated:  10/07/17 2130     Troponin I 0.03 ng/mL       Serial Number: [id number]:  [id number]       Comprehensive Metabolic Panel [[id number]]  (Abnormal) Collected:  10/07/17 2103    Specimen:  Blood Updated:  10/07/17 2130     Glucose 137 (H) mg/dL      BUN 14 mg/dL      Creatinine 0.70 mg/dL      Sodium 137 mmol/L      Potassium 3.4 (L) mmol/L      Chloride 106 mmol/L      CO2 24.0 mmol/L      Calcium 9.6 mg/dL      Total Protein 7.0 g/dL      Albumin 3.50 g/dL      ALT (SGPT) 100 (H) U/L      AST (SGOT) 42 (H) U/L      Alkaline Phosphatase 115 (H) U/L      Total Bilirubin 0.4 mg/dL      eGFR Non African Amer 117 mL/min/1.73      Globulin 3.5 gm/dL      A/G Ratio 1.0 (L) g/dL      BUN/Creatinine Ratio 20.0     Anion Gap 7.0 mmol/L     Narrative:       National Kidney Foundation Guidelines    Stage     Description        GFR  1         Normal or High     90+  2         Mild decrease      60-89  3         Moderate decrease  30-59  4         Severe decrease    15-29  5         Kidney failure     <15    Uric Acid [[id number]]  (Normal) Collected:  10/07/17 2103    Specimen:  Blood Updated:  10/07/17 2130     Uric Acid 5.5 mg/dL       Falsely depressed results may occur on samples drawn from patients receiving N-Acetylcysteine (NAC) or Metamizole.       Lactic Acid, Plasma [[id number]]  (Normal) Collected:  10/07/17 2133    Specimen:  Blood Updated:  10/07/17 2147     Lactate 1.5 mmol/L       Falsely depressed results may occur on samples drawn  from patients receiving N-Acetylcysteine (NAC) or Metamizole.       C-reactive Protein [[id number]]  (Abnormal) Collected:  10/07/17 2133    Specimen:  Blood Updated:  10/07/17 2148     C-Reactive Protein 9.22 (H) mg/dL     Procalcitonin [[id number]] Collected:  10/07/17 2133    Specimen:  Blood Updated:  10/07/17 2211     Procalcitonin 0.16 ng/mL     Narrative:       As a Marker for Sepsis (Non-Neonates):   1. <0.5 ng/mL represents a low risk of severe sepsis and/or septic shock.  2. >2 ng/mL represents a high risk of severe sepsis and/or septic shock.    As a Marker for Lower Respiratory Tract Infections that require antibiotic therapy:    PCT on Admission     Antibiotic Therapy       6-12 Hrs later  > 0.5                Strongly Recommended             >0.25 - <0.5         Recommended  0.1 - 0.25           Discouraged              Remeasure/reassess PCT  <0.1                 Strongly Discouraged     Remeasure/reassess PCT                     PCT values of < 0.5 ng/mL do not exclude an infection, because localized infections (without systemic signs) may be associated with such low concentrations, or a systemic infection in its initial stages (< 6 hours). Furthermore, increased PCT can occur without infection. PCT concentrations between 0.5 and 2.0 ng/mL should be interpreted taking into account the patient's history. It is recommended to retest PCT within 6-24 hours if any concentrations < 2 ng/mL are obtained.    Light Blue Top [[id number]] Collected:  10/07/17 2103    Specimen:  Blood Updated:  10/07/17 2216     Extra Tube hold for add-on      Auto resulted       Lavender Top [[id number]] Collected:  10/07/17 2103    Specimen:  Blood Updated:  10/07/17 2216     Extra Tube hold for add-on      Auto resulted       Gold Top - SST [[id number]] Collected:  10/07/17 2103    Specimen:  Blood Updated:  10/07/17 2216     Extra Tube Hold for add-ons.      Auto resulted.       [place] Draw [[id number]] Collected:  10/07/17 2103     Specimen:  Blood Updated:  10/07/17 2216    Narrative:       The following orders were created for panel order [place] Draw.  Procedure                               Abnormality         Status                     ---------                               -----------         ------                     Light Blue Top[[id number]]                                   Final result               Green Top (Gel)[[id number]]                                  Final result               Lavender Top[[id number]]                                     Final result               Gold Top - SST[[id number]]                                   Final result               Green Top (No Gel)[[id number]]                                                            Please view results for these tests on the individual orders.    Green Top (Gel) [[id number]] Collected:  10/07/17 2103    Specimen:  Blood Updated:  10/07/17 2216     Extra Tube Hold for add-ons.      Auto resulted.       Crystal Exam, Fluid - Synovial Fluid, Knee, Left [[id number]] Collected:  10/07/17 2347    Specimen:  Synovial Fluid from Knee, Left Updated:  10/08/17 0059     Crystals, Fluid No crystals seen    Crystal Exam, Fluid - Synovial Fluid, Wrist, Left [[id number]] Collected:  10/07/17 2347    Specimen:  Synovial Fluid from Wrist, Left Updated:  10/08/17 0216     Crystals, Fluid No crystals seen    Crystal Exam, Fluid - Synovial Fluid, Wrist, Right [[id number]] Collected:  10/07/17 2348    Specimen:  Synovial Fluid from Wrist, Right Updated:  10/08/17 0242     Crystals, Fluid No crystals seen    Hemoglobin A1c [[id number]]  (Abnormal) Collected:  10/08/17 0515    Specimen:  Blood Updated:  10/08/17 0629     Hemoglobin A1C 5.80 (H) %     Narrative:       The American Diabetes Association recommends maintenance of Hemoglobin A1C at 7.0% or lower. Goals for Hemoglobin A1C reduction may need to be modified if hypoglycemia is a problem.    BNP [[id number]]  (Normal) Collected:  10/08/17  0515    Specimen:  Blood Updated:  10/08/17 0647     BNP 5.0 pg/mL     Lactic Acid, Plasma [[id number]]  (Normal) Collected:  10/08/17 0515    Specimen:  Blood Updated:  10/08/17 0648     Lactate 0.9 mmol/L       Falsely depressed results may occur on samples drawn from patients receiving N-Acetylcysteine (NAC) or Metamizole.       CBC Auto Differential [[id number]]  (Abnormal) Collected:  10/08/17 0515    Specimen:  Blood Updated:  10/08/17 0659     WBC 4.99 10*3/mm3      RBC 3.74 (L) 10*6/mm3      Hemoglobin 12.8 (L) g/dL      Hematocrit 38.0 (L) %      .6 (H) fL      MCH 34.2 (H) pg      MCHC 33.7 g/dL      RDW 14.0 %      RDW-SD 51.3 fl      MPV 9.5 fL      Platelets 308 10*3/mm3      Neutrophil % 49.9 %      Lymphocyte % 30.5 %      Monocyte % 15.8 (H) %      Eosinophil % 2.8 %      Basophil % 0.4 %      Immature Grans % 0.6 %      Neutrophils, Absolute 2.49 10*3/mm3      Lymphocytes, Absolute 1.52 10*3/mm3      Monocytes, Absolute 0.79 10*3/mm3      Eosinophils, Absolute 0.14 10*3/mm3      Basophils, Absolute 0.02 10*3/mm3      Immature Grans, Absolute 0.03 10*3/mm3     Magnesium [[id number]]  (Normal) Collected:  10/08/17 0515    Specimen:  Blood Updated:  10/08/17 0720     Magnesium 1.7 mg/dL     T4, Free [[id number]]  (Normal) Collected:  10/08/17 0515    Specimen:  Blood Updated:  10/08/17 0720     Free T4 1.15 ng/dL     Comprehensive Metabolic Panel [[id number]]  (Abnormal) Collected:  10/08/17 0515    Specimen:  Blood Updated:  10/08/17 0720     Glucose 86 mg/dL      BUN 15 mg/dL      Creatinine 0.70 mg/dL      Sodium 137 mmol/L      Potassium 3.1 (L) mmol/L      Chloride 104 mmol/L      CO2 23.0 mmol/L      Calcium 9.1 mg/dL      Total Protein 5.8 g/dL      Albumin 3.10 (L) g/dL      ALT (SGPT) 92 (H) U/L      AST (SGOT) 40 (H) U/L      Alkaline Phosphatase 93 U/L      Total Bilirubin 0.4 mg/dL      eGFR Non African Amer 117 mL/min/1.73      Globulin 2.7 gm/dL      A/G Ratio 1.1 (L) g/dL       BUN/Creatinine Ratio 21.4     Anion Gap 10.0 mmol/L     Narrative:       National Kidney Foundation Guidelines    Stage     Description        GFR  1         Normal or High     90+  2         Mild decrease      60-89  3         Moderate decrease  30-59  4         Severe decrease    15-29  5         Kidney failure     <15    TSH [[id number]]  (Normal) Collected:  10/08/17 0515    Specimen:  Blood Updated:  10/08/17 0720     TSH 2.630 mIU/mL     Procalcitonin [[id number]] Collected:  10/08/17 0515    Specimen:  Blood Updated:  10/08/17 0721     Procalcitonin 0.11 ng/mL     Narrative:       As a Marker for Sepsis (Non-Neonates):   1. <0.5 ng/mL represents a low risk of severe sepsis and/or septic shock.  2. >2 ng/mL represents a high risk of severe sepsis and/or septic shock.    As a Marker for Lower Respiratory Tract Infections that require antibiotic therapy:    PCT on Admission     Antibiotic Therapy       6-12 Hrs later  > 0.5                Strongly Recommended             >0.25 - <0.5         Recommended  0.1 - 0.25           Discouraged              Remeasure/reassess PCT  <0.1                 Strongly Discouraged     Remeasure/reassess PCT                     PCT values of < 0.5 ng/mL do not exclude an infection, because localized infections (without systemic signs) may be associated with such low concentrations, or a systemic infection in its initial stages (< 6 hours). Furthermore, increased PCT can occur without infection. PCT concentrations between 0.5 and 2.0 ng/mL should be interpreted taking into account the patient's history. It is recommended to retest PCT within 6-24 hours if any concentrations < 2 ng/mL are obtained.    Body fluid cell count - Body Fluid, [[id number]]  (Abnormal) Collected:  10/07/17 7237    Specimen:  Synovial Fluid from Wrist, Left Updated:  10/08/17 1024     Color, Fluid Brown     Appearance, Fluid Turbid (A)     WBC, Fluid 977260 /mm3       Corrected result. Previous result was 174  /mm3 on 10/8/2017 at 0212 EDT  Corrected result. Previous result was 174.200 /mm3 on 10/8/2017 at 0243 EDT        RBC, Fluid 69216 /mm3     Body fluid cell count - Body Fluid, [[id number]]  (Abnormal) Collected:  10/07/17 2347    Specimen:  Synovial Fluid from Knee, Left Updated:  10/08/17 1032     Color, Fluid Yellow     Appearance, Fluid Turbid (A)     WBC, Fluid 281224 /mm3       Corrected result. Previous result was 201.000 /mm3 on 10/8/2017 at 0244 EDT        RBC, Fluid 90483 /mm3     Body Fluid Cell Count With Differential - Synovial Fluid, Wrist, Left [[id number]] Collected:  10/07/17 2347    Specimen:  Synovial Fluid from Wrist, Left Updated:  10/08/17 1040    Narrative:       The following orders were created for panel order Body Fluid Cell Count With Differential - Synovial Fluid, Wrist, Left.  Procedure                               Abnormality         Status                     ---------                               -----------         ------                     Body fluid cell count - ...[[id number]]  Abnormal            Edited Result - FINAL      Body fluid differential ...[[id number]]                      Edited Result - FINAL        Please view results for these tests on the individual orders.    Body Fluid Cell Count With Differential - Synovial Fluid, Knee, Left [[id number]] Collected:  10/07/17 2347    Specimen:  Synovial Fluid from Knee, Left Updated:  10/08/17 1040    Narrative:       The following orders were created for panel order Body Fluid Cell Count With Differential - Synovial Fluid, Knee, Left.  Procedure                               Abnormality         Status                     ---------                               -----------         ------                     Body fluid cell count - ...[[id number]]  Abnormal            Edited Result - FINAL      Body fluid differential ...[[id number]]                      Edited Result - FINAL        Please view results for these tests on the individual  orders.    Body fluid differential - Body Fluid, [[id number]] Collected:  10/07/17 2347    Specimen:  Synovial Fluid from Knee, Left Updated:  10/08/17 1040     Neutrophils, Fluid 94 %       Appended report.  These results have been appended to a previously final verified report.        Lymphocytes, Fluid 1 %       Appended report.  These results have been appended to a previously final verified report.        Monocytes, Fluid 5 %       Appended report.  These results have been appended to a previously final verified report.       Body fluid differential - Body Fluid, [[id number]] Collected:  10/07/17 2347    Specimen:  Synovial Fluid from Wrist, Left Updated:  10/08/17 1040     Neutrophils, Fluid 95 %       Appended report.  These results have been appended to a previously final verified report.        Lymphocytes, Fluid 1 %       Appended report.  These results have been appended to a previously final verified report.        Monocytes, Fluid 3 %       Appended report.  These results have been appended to a previously final verified report.        Mesothelial Cells, Fluid 1 %       Appended report.  These results have been appended to a previously final verified report.       Body Fluid Cell Count With Differential - Synovial Fluid, Wrist, Right [[id number]] Collected:  10/07/17 2348    Specimen:  Synovial Fluid from Wrist, Right Updated:  10/08/17 1149    Narrative:       The following orders were created for panel order Body Fluid Cell Count With Differential - Synovial Fluid, Wrist, Right.  Procedure                               Abnormality         Status                     ---------                               -----------         ------                     Body fluid cell count - ...[[id number]]  Abnormal            Edited Result - FINAL      Body fluid differential ...[[id number]]                      Edited Result - FINAL        Please view results for these tests on the individual orders.    Body fluid cell  count - Body Fluid, [[id number]]  (Abnormal) Collected:  10/07/17 2348    Specimen:  Synovial Fluid from Wrist, Right Updated:  10/08/17 1149     Color, Fluid Red     Appearance, Fluid Turbid (A)     WBC, Fluid 439540 /mm3       Specimen clotted  Corrected result. Previous result was 137.000 /mm3 on 10/8/2017 at 0242 EDT        RBC, Fluid 436946 /mm3       Specimen clotted       Body fluid differential - Body Fluid, [[id number]] Collected:  10/07/17 2348    Specimen:  Synovial Fluid from Wrist, Right Updated:  10/08/17 1149     Neutrophils, Fluid 95 %      Lymphocytes, Fluid 1 %      Monocytes, Fluid 3 %      Macrophage, Fluid 1 %     Hemoglobin & Hematocrit, Blood [[id number]]  (Abnormal) Collected:  10/08/17 1513    Specimen:  Blood Updated:  10/08/17 1522     Hemoglobin 12.3 (L) g/dL      Hematocrit 36.7 (L) %     Basic Metabolic Panel [[id number]]  (Abnormal) Collected:  10/08/17 1514    Specimen:  Blood Updated:  10/08/17 1542     Glucose 101 (H) mg/dL      BUN 16 mg/dL      Creatinine 0.70 mg/dL      Sodium 138 mmol/L      Potassium 3.6 mmol/L      Chloride 109 mmol/L      CO2 25.0 mmol/L      Calcium 8.5 (L) mg/dL      eGFR Non African Amer 117 mL/min/1.73      BUN/Creatinine Ratio 22.9     Anion Gap 4.0 mmol/L     Narrative:       National Kidney Foundation Guidelines    Stage     Description        GFR  1         Normal or High     90+  2         Mild decrease      60-89  3         Moderate decrease  30-59  4         Severe decrease    15-29  5         Kidney failure     <15    Hemoglobin & Hematocrit, Blood [[id number]]  (Abnormal) Collected:  10/09/17 0550    Specimen:  Blood Updated:  10/09/17 0641     Hemoglobin 12.0 (L) g/dL      Hematocrit 36.7 (L) %     Basic Metabolic Panel [[id number]]  (Abnormal) Collected:  10/09/17 0550    Specimen:  Blood Updated:  10/09/17 0728     Glucose 79 mg/dL      BUN 11 mg/dL      Creatinine 0.70 mg/dL      Sodium 141 mmol/L      Potassium 3.3 (L) mmol/L      Chloride 107  mmol/L      CO2 22.0 mmol/L      Calcium 8.2 (L) mg/dL      eGFR Non African Amer 117 mL/min/1.73      BUN/Creatinine Ratio 15.7     Anion Gap 12.0 (H) mmol/L     Narrative:       National Kidney Foundation Guidelines    Stage     Description        GFR  1         Normal or High     90+  2         Mild decrease      60-89  3         Moderate decrease  30-59  4         Severe decrease    15-29  5         Kidney failure     <15    Magnesium [[id number]]  (Normal) Collected:  10/09/17 0550    Specimen:  Blood Updated:  10/09/17 0728     Magnesium 2.1 mg/dL     Body Fluid Culture - Synovial Fluid, Wrist, Right [[id number]]  (Abnormal) Collected:  10/08/17 0140    Specimen:  Synovial Fluid from Wrist, Right Updated:  10/09/17 1050     BF Culture --      Moderate growth (3+) Staphylococcus aureus (A)     Gram Stain Result Many (4+) WBCs seen      No organisms seen    Vancomycin, Trough [[id number]]  (Normal) Collected:  10/09/17 1300    Specimen:  Blood Updated:  10/09/17 1356     Vancomycin Trough 11.90 mcg/mL     T3, Free [[id number]] Collected:  10/08/17 0515    Specimen:  Blood Updated:  10/09/17 1710     T3, Free 2.9 pg/mL     Narrative:       Performed at:  [address] [place] [address]  [id number]  : Ricci Bailey PhD, Phone:  [phone]    Potassium [[id number]]  (Normal) Collected:  10/09/17 2158    Specimen:  Blood Updated:  10/09/17 2237     Potassium 4.3 mmol/L       Result checked       Basic Metabolic Panel [[id number]]  (Abnormal) Collected:  10/10/17 0424    Specimen:  Blood Updated:  10/10/17 0622     Glucose 80 mg/dL      BUN 9 mg/dL      Creatinine 0.60 mg/dL      Sodium 141 mmol/L      Potassium 3.9 mmol/L      Chloride 114 (H) mmol/L      CO2 22.0 mmol/L      Calcium 7.7 (L) mg/dL      eGFR Non African Amer 139 mL/min/1.73      BUN/Creatinine Ratio 15.0     Anion Gap 5.0 mmol/L     Narrative:       National Kidney Foundation Guidelines    Stage     Description         GFR  1         Normal or High     90+  2         Mild decrease      60-89  3         Moderate decrease  30-59  4         Severe decrease    15-29  5         Kidney failure     <15    Body Fluid Culture - Synovial Fluid, Knee, Left [[id number]]  (Abnormal)  (Susceptibility) Collected:  10/08/17 0130    Specimen:  Synovial Fluid from Knee, Left Updated:  10/10/17 1423     BF Culture --      Moderate growth (3+) Staphylococcus aureus (A)     Gram Stain Result Many (4+) WBCs seen      No organisms seen    Susceptibility      Staphylococcus aureus     IVAN (Preliminary)     Ceftriaxone <=8 ug/ml Susceptible     Clindamycin <=0.5 ug/ml Susceptible     Daptomycin <=0.5 ug/ml Susceptible     Erythromycin <=0.5 ug/ml Susceptible     Gentamicin <=4 ug/ml Susceptible     Levofloxacin <=1 ug/ml Susceptible  [1]      Linezolid 2 ug/ml Susceptible     Oxacillin <=0.25 ug/ml Susceptible     Penicillin G Resistant     Quinupristin + Dalfopristin <=0.5 ug/ml Susceptible     Rifampin <=1 ug/ml Susceptible     Tetracycline <=4 ug/ml Susceptible     Trimethoprim + Sulfamethoxazole <=0.5/9.5 ug/ml Susceptible     Vancomycin 2 ug/ml Susceptible            [1]   Staphylococcus species may develop resistance during prolonged therapy with quinolones.  Isolates that are initially susceptible may become resistant within three to four days after initiation of therapy. Testing of repeat isolates may be warranted.                 Body Fluid Culture - Synovial Fluid, Wrist, Left [[id number]]  (Abnormal)  (Susceptibility) Collected:  10/08/17 0100    Specimen:  Synovial Fluid from Wrist, Left Updated:  10/10/17 1425     BF Culture --      Heavy growth (4+) Staphylococcus aureus (A)     Gram Stain Result Many (4+) WBCs seen      No organisms seen    Susceptibility      Staphylococcus aureus     IVAN (Preliminary)     Ceftriaxone <=8 ug/ml Susceptible     Clindamycin <=0.5 ug/ml Susceptible     Daptomycin <=0.5 ug/ml Susceptible     Erythromycin <=0.5  ug/ml Susceptible     Gentamicin <=4 ug/ml Susceptible     Levofloxacin <=1 ug/ml Susceptible  [1]      Linezolid 2 ug/ml Susceptible     Oxacillin <=0.25 ug/ml Susceptible     Penicillin G Resistant     Quinupristin + Dalfopristin <=0.5 ug/ml Susceptible     Rifampin <=1 ug/ml Susceptible     Tetracycline <=4 ug/ml Susceptible     Trimethoprim + Sulfamethoxazole <=0.5/9.5 ug/ml Susceptible     Vancomycin 2 ug/ml Susceptible            [1]   Staphylococcus species may develop resistance during prolonged therapy with quinolones.  Isolates that are initially susceptible may become resistant within three to four days after initiation of therapy. Testing of repeat isolates may be warranted.                 Blood Culture - Blood, [[id number]]  (Normal) Collected:  10/07/17 2141    Specimen:  Blood from Arm, Right Updated:  10/10/17 2201     Blood Culture No growth at 3 days    Blood Culture - Blood, [[id number]]  (Normal) Collected:  10/07/17 2141    Specimen:  Blood from Arm, Right Updated:  10/10/17 2201     Blood Culture No growth at 3 days    CBC & Differential [[id number]] Collected:  10/11/17 0659    Specimen:  Blood Updated:  10/11/17 0724    Narrative:       The following orders were created for panel order CBC & Differential.  Procedure                               Abnormality         Status                     ---------                               -----------         ------                     CBC Auto Differential[[id number]]        Abnormal            Final result                 Please view results for these tests on the individual orders.    CBC Auto Differential [[id number]]  (Abnormal) Collected:  10/11/17 0659    Specimen:  Blood Updated:  10/11/17 0724     WBC 5.60 10*3/mm3      RBC 3.42 (L) 10*6/mm3      Hemoglobin 11.5 (L) g/dL      Hematocrit 34.8 (L) %      .8 (H) fL      MCH 33.6 (H) pg      MCHC 33.0 g/dL      RDW 14.1 %      RDW-SD 52.1 fl      MPV 8.9 fL      Platelets 283 10*3/mm3       Neutrophil % 53.6 %      Lymphocyte % 29.6 %      Monocyte % 10.7 %      Eosinophil % 4.8 (H) %      Basophil % 0.4 %      Immature Grans % 0.9 (H) %      Neutrophils, Absolute 3.00 10*3/mm3      Lymphocytes, Absolute 1.66 10*3/mm3      Monocytes, Absolute 0.60 10*3/mm3      Eosinophils, Absolute 0.27 10*3/mm3      Basophils, Absolute 0.02 10*3/mm3      Immature Grans, Absolute 0.05 (H) 10*3/mm3     Calcium, Ionized [[id number]]  (Normal) Collected:  10/11/17 0659    Specimen:  Blood Updated:  10/11/17 0733     Ionized Calcium 1.26 mmol/L     Basic Metabolic Panel [[id number]]  (Abnormal) Collected:  10/11/17 0659    Specimen:  Blood Updated:  10/11/17 0755     Glucose 73 mg/dL      BUN 6 (L) mg/dL      Creatinine 0.50 (L) mg/dL      Sodium 141 mmol/L      Potassium 3.5 mmol/L      Chloride 111 (H) mmol/L      CO2 25.0 mmol/L      Calcium 8.0 (L) mg/dL      eGFR Non African Amer >150 mL/min/1.73      BUN/Creatinine Ratio 12.0     Anion Gap 5.0 mmol/L     Narrative:       [employer] Guidelines    Stage     Description        GFR  1         Normal or High     90+  2         Mild decrease      60-89  3         Moderate decrease  30-59  4         Severe decrease    15-29  5         Kidney failure     <15    Vancomycin, Trough Nursing: Vancomycin trough due on 10/11 at 1300. Please hold 10/11 1400 dose until notified by Pharmacy. [[id number]]  (Abnormal) Collected:  10/11/17 1304    Specimen:  Blood Updated:  10/11/17 1345     Vancomycin Trough 9.50 (L) mcg/mL         Imaging Results (all)     Procedure Component Value Units Date/Time    XR Chest 1 View [[id number]]  (Abnormal) Collected:  10/07/17 2111     Updated:  10/07/17 2239    Narrative:       EXAM:    XR Chest, 1 View    CLINICAL HISTORY:    57 years old, male; Pain; Chest pain; Type not specified; Additional info:   Redness and swelling of left forearm, Tachycardia, rheumatoid arthritis    TECHNIQUE:    Frontal view of the chest.    COMPARISON:    [facility] 6/4/2014 12:51:11 PM    FINDINGS:    Lungs:  Unremarkable.  No consolidation.    Pleural space:  Unremarkable.  No pneumothorax.    Heart:  Unremarkable.  No cardiomegaly.    Mediastinum:  Unremarkable.    Bones/joints:  Old healed right posterior rib fractures.      Impression:         No acute abnormality.    THIS DOCUMENT HAS BEEN ELECTRONICALLY SIGNED BY ARMAND AMEZQUITA MD    XR Knee 1 or 2 View Left [[id number]]  (Abnormal) Collected:  10/08/17 0005     Updated:  10/08/17 0041    Narrative:       EXAM:    XR Left Knee, 1 or 2 views    CLINICAL HISTORY:    57 years old, male; Pain; Knee; Left; Additional info: Ra with swelling and   pain    TECHNIQUE:    Frontal and/or lateral views of the left knee.    COMPARISON:    No relevant prior studies available.    FINDINGS:    Bones/joints:  Severe tricompartmental joint space narrowing, worst   laterally.  No acute fracture.  No dislocation.    Soft tissues:  Soft tissue swelling at the knee.  Small suprapatellar   effusion.      Impression:       1.  Severe tricompartmental joint space narrowing, worst laterally.  2.  Soft tissue swelling at the knee.  Small suprapatellar effusion.    THIS DOCUMENT HAS BEEN ELECTRONICALLY SIGNED BY ARMAND AMEZQUITA MD    XR Wrist 3+ View Left [[id number]]  (Abnormal) Collected:  10/08/17 0005     Updated:  10/08/17 0048    Narrative:       EXAM:    XR Left Wrist Complete, 3 or More Views    CLINICAL HISTORY:    57 years old, male; Pain; Wrist; Left; Additional info: Ra with swelling and   pain    TECHNIQUE:    Frontal, lateral and oblique views of the left wrist.    COMPARISON:    No relevant prior studies available.    FINDINGS:    Bones/joints: No fracture. Severe degenerative changes consistent with   rheumatoid arthritis (worse on the left wrist) with mild palmar subluxation of   the carpal bones in relation to the radius and ulna.  Carpal joint space   narrowing.  Severe radiocarpal joint space narrowing.   Second and third   metacarpophalangeal periarticular lucencies.  No acute fracture.    Soft tissues: Soft tissue swelling of the wrist.  No radiopaque foreign body.      Impression:         Severe degenerative changes consistent with rheumatoid arthritis (worse on   the left wrist) with mild palmar subluxation of the carpal bones in relation to   the radius and ulna.    THIS DOCUMENT HAS BEEN ELECTRONICALLY SIGNED BY NASIMA CLAROS MD    XR Wrist 3+ View Right [[id number]]  (Abnormal) Collected:  10/08/17 0005     Updated:  10/08/17 0049    Addenda:        IMPRESSION:       1.  No acute fracture.  2.  Degenerative changes of the right wrist consistent with rheumatoid   arthritis.    THIS DOCUMENT HAS BEEN ELECTRONICALLY SIGNED BY NASIMA CLAROS MD  Signed:  10/08/17 0049 by Nasima Claros MD    Narrative:       EXAM:    XR Right Wrist Complete, 3 or More Views    CLINICAL HISTORY:    57 years old, male; Pain; Wrist; Right; Additional info: Ra with swelling and   pain    TECHNIQUE:    Frontal, lateral and oblique views of the right wrist.    COMPARISON:    Left wrist radiograph same date    FINDINGS:    Bones/joints:  No acute fracture.  Radiocarpal joint space narrowing.    Ulnocarpal joint space narrowing.  Diffuse carpal joint space narrowing.    Carpal periarticular lucencies consistent with rheumatoid arthritis.  Fifth   carpometacarpal periarticular lucencies.  First and fifth metacarpophalangeal   periarticular lucencies.  No dislocation.    Soft tissues:  Unremarkable.  No radiopaque foreign body.      Impression:       1.  No acute fracture.  2.  Degenerative changes of the right wrist consistent with rheumatoid   arthritis.    THIS DOCUMENT HAS BEEN ELECTRONICALLY SIGNED BY NASIMA CLAROS MD    XR Chest 1 View [[id number]] Collected:  10/08/17 1547     Updated:  10/09/17 1053    Narrative:       EXAMINATION: XR CHEST 1 VW - 10/08/2017     INDICATION: M06.9-Rheumatoid arthritis, unspecified;  R00.0-Tachycardia,  unspecified; M00.9-Pyogenic arthritis, unspecified.     COMPARISON: 10/07/2017.     FINDINGS: Portable chest reveals deep line catheter on the right with  tip in the SVC. Heart is borderline enlarged. Underlying chronic change  is seen within the lung fields. Healed fracture is seen of the right  posterior ribs. No pleural effusion or pneumothorax. Pulmonary  vascularity is within normal limits.        Impression:       Deep line catheter identified on the right with tip in the  SVC. No evidence of pneumothorax.     DICTATED:     10/08/2017  EDITED:         10/08/2017        This report was finalized on 10/9/2017 10:51 AM by Dr. Yue Fierro MD.       CT Lower Extremity Left With Contrast [[id number]] Collected:  10/11/17 0807     Updated:  10/11/17 1126    Narrative:       EXAMINATION: CT LOWER EXTREMITY, LEFT, WITH CONTRAST-10/10/2017:      INDICATION: Mass on left anteromedial aspect found incidentally on  venous duplex exam; M06.9-Rheumatoid arthritis, unspecified;  R00.0-Tachycardia, unspecified; M00.9-Pyogenic arthritis, unspecified;  Z74.09-Other reduced mobility; Z74.09-Other reduced mobility.     TECHNIQUE: CT lower extremity with intravenous contrast administration.     The radiation dose reduction device was turned on for each scan per the  ALARA (As Low as Reasonably Achievable) protocol.     COMPARISON: Knee radiographs 10/08/2017.     FINDINGS: Peripherally enhancing multiloculated fluid attenuation lesion  extending from the proximal imaged mid thigh primarily involving the  anterior and anteromedial thigh compartment with lobulated portions  extending into the medial and lateral segment surrounding the femur with  intermittent association of the anterior thigh musculature. Soft tissue  thickening and edema noted laterally with overlying induration. This  collection/lesion extends to involve the suprapatellar region and joint  space.     The osseous structures demonstrate  "subchondral cystic change and  osteophytosis with tricompartmental degenerative changes noted. There is  an irregular subchondral lucency of the tibial plateau left of midline  extending into the metaphysis which may represent large subchondral  cyst, however given adjacent inflammatory findings concerning for  osseous erosion from underlying inflammatory findings. The visualized  superficial femoral and popliteal arteries are patent with minimal  atherosclerotic involvement.       Impression:       1. Complex abnormal peripherally enhancing fluid collection with  multilobulated appearance extending from the visualized mid to involve  the joint space and the posterior margin of the tibial plateau. This has  imaging characteristics consistent of abscess collection and with joint  space involvement concerning for pyogenic arthritis.  2. Tricompartmental degenerative changes with subchondral cystic change,  however, there is a prominent area of subchondral lucency involving the  tibial plateau near the tibial spines which is enlarged with irregular  cortical margin concerning for osseous erosion or osseous involvement of  adjacent inflammation with osteomyelitis considered. Sterility of joint  cannot be ascertained by imaging alone and fluid sampling may be useful  for further evaluation.      D:  10/11/2017  E:  10/11/2017     This report was finalized on 10/11/2017 11:24 AM by Dr. Rj Montanez.         Cultures:  Blood Culture   Date Value Ref Range Status   10/07/2017 No growth at 3 days  Preliminary   10/07/2017 No growth at 3 days  Preliminary       Wound culture: + MSSA    Condition on Discharge: Stable    Physical Exam on Discharge:/83  Pulse 86  Temp 97.7 °F (36.5 °C) (Oral)   Resp 18  Ht 72\" (182.9 cm)  Wt 229 lb 9.6 oz (104 kg)  SpO2 97%  BMI 31.14 kg/m2     Physical Exam    General Assessment: No acute cardiopulmonary distress. Well developed and well nourished.      Neck: Supple      CVS: RRR, " S1S2 normal, no murmurs      Resp: CTAB, no adventitious sound      Abd: soft, NT, ND, normal BS, no guarding or peritoneal signs      Ext: LLE larger than RLE, but has improved since admission, no erythema. Both wrist dressings are C/D/I. Both hands/fingers well perfused. Dressing over L knee C/D/I.      Neuro: Nonfocal, no facial asymmetry and moving all extremities      Psych: Affect is appropriate    Discharge Disposition  [facility] (DC - External)    Discharge Medications   Richard Guevara   Home Medication Instructions MERLIN:[id number]    Printed on:10/11/17 1473   Medication Information                      amitriptyline (ELAVIL) 75 MG tablet  Take  by mouth Every Night.             aspirin 81 MG tablet  Take  by mouth Daily.             atorvastatin (LIPITOR) 40 MG tablet  Take 40 mg by mouth Daily.             carvedilol (COREG) 12.5 MG tablet  Take 1 tablet by mouth Every 12 (Twelve) Hours.             citalopram (CeleXA) 20 MG tablet  Take 20 mg by mouth Daily.             diltiaZEM CD (CARTIA XT) 120 MG 24 hr capsule  Take 120 mg by mouth Daily.             esomeprazole (nexIUM) 40 MG capsule  Take 40 mg by mouth Every Morning Before Breakfast.             folic acid-vit B6-vit B12 (FOLGARD) 2.2-25-1 MG tablet tablet  Take  by mouth Daily.             loratadine (CLARITIN) 10 MG tablet  Take 10 mg by mouth Daily.             raNITIdine (ZANTAC) 150 MG tablet  Take 150 mg by mouth 2 (Two) Times a Day.                   Follow-up Appointments  Future Appointments  Date Time Provider Department [facility]   10/24/2017 8:20 AM Addy Ortiz MD [facility] None     Additional Instructions for the Follow-ups that You Need to Schedule     Discharge Follow-up with Specialty    As directed    Specialty:  [facility]   Follow Up:  2 Weeks   Has the patient’s follow-up appointment been scheduled and documented in the discharge navigator?:  Yes, documented in the appointment section                 Test  Results Pending at Discharge   Order Current Status    Blood Culture - Blood, Preliminary result    Blood Culture - Blood, Preliminary result    Body Fluid Culture - Synovial Fluid, Knee, Left Preliminary result    Body Fluid Culture - Synovial Fluid, Wrist, Left Preliminary result    Body Fluid Culture - Synovial Fluid, Wrist, Right Preliminary result           Loren Martin MD 10/11/17 1:55 PM    Time: Discharge 50 min    Please note that portions of this note may have been completed with a voice recognition program. Efforts were made to edit the dictations, but occasionally words are mistranscribed.

## 2017-10-11 NOTE — THERAPY DISCHARGE NOTE
Acute Care - Occupational Therapy Treatment Note/Discharge  [facility]     Patient Name: Richard Guevara  : 1960  MRN: [id number]  Today's Date: 10/11/2017  Onset of Illness/Injury or Date of Surgery Date: 10/08/17  Date of Referral to OT: 10/08/17  Referring Physician: Angel      Admit Date: 10/7/2017    Visit Dx:     ICD-10-CM ICD-9-CM   1. Rheumatoid arthritis flare M06.9 714.0   2. Tachycardia R00.0 785.0   3. Pyogenic arthritis of multiple sites, due to unspecified organism M00.9 711.09   4. Impaired mobility and ADLs Z74.09 799.89   5. Impaired functional mobility, balance, gait, and endurance Z74.09 V49.89     Patient Active Problem List   Diagnosis   • Rheumatoid arthritis   • Gout   • Hypertension   • CAD (coronary artery disease)   • HLD (hyperlipidemia)   • Acute joint pain   • Joint erythema   • Altered mental status   • Immobility   • Weakness   • Rheumatoid arthritis flare   • Pyogenic arthritis of multiple sites   • Abscess   • Osteomyelitis of left tibia             Adult Rehabilitation Note       10/11/17 1130 10/11/17 1120       Rehab Assessment/Intervention    Discipline occupational therapist  -AR physical therapy assistant  -UD     Document Type therapy note (daily note)   POD#3  -AR therapy note (daily note)  -UD     Subjective Information no complaints;agree to therapy  -AR agree to therapy;complains of;pain  -UD     Patient Effort, Rehab Treatment good  -AR      Symptoms Noted During/After Treatment fatigue;increased pain  -AR fatigue;increased pain  -UD     Symptoms Noted Comment nurse notified  -AR      Precautions/Limitations fall precautions;other (see comments)   ROM as tolerated B wrists, protective WB BUE  -AR fall precautions  -UD     Specific Treatment Considerations KI RLE d/t buckling previous session  -AR      Patient Response to Treatment duplex LLE (-);however mass found  -AR      Recorded by [AR] Kim Martinez, OT [UD] Claudia Caruso PTA     Vital Signs    O2  Delivery Post Treatment  room air  -UD     Pre Patient Position  Supine  -UD     Intra Patient Position  Standing  -UD     Post Patient Position  Sitting  -UD     Recorded by  [UD] Claudia Caruso PTA     Pain Assessment    Pain Assessment 0-10  -AR 0-10  -UD     Pain Score 5  -AR 5  -UD     Post Pain Score 5  -AR 5  -UD     Pain Type Acute pain  -AR Acute pain  -UD     Pain Location Knee  -AR Knee  -UD     Pain Orientation Right;Left  -AR      Pain Intervention(s) Medication (See MAR);Repositioned;Ambulation/increased activity  -AR Repositioned  -UD     Response to Interventions tolerated  -AR      Multiple Pain Sites Yes  -AR      Recorded by [AR] Kim Martinez OT [UD] Claudia Caruso PTA     Pain 2    Pain Score 2 0  -AR      Pre Tx Pain Score 2 5  -AR      Post Tx Pain Score 2 5  -AR      Pain Type 2 Acute pain  -AR      Pain Location 2 Wrist  -AR      Pain Orientation 2 Right;Left  -AR      Pain Intervention(s) 2 Medication (See MAR);Repositioned;Ambulation/increased activity  -AR      Response to Interventions 2 tolerated  -AR      Recorded by [AR] Kim Martinez OT      Cognitive Assessment/Intervention    Current Cognitive/Communication Assessment functional  -AR      Orientation Status oriented x 4  -AR oriented x 4  -UD     Follows Commands/Answers Questions 100% of the time;able to follow multi-step instructions  -% of the time  -UD     Personal Safety WNL/WFL  -AR      Personal Safety Interventions fall prevention program maintained  -AR fall prevention program maintained  -UD     Recorded by [AR] Kim Martinez OT [UD] Claudia Caruso PTA     Mobility Assessment/Training    Extremity Weight-Bearing Status left upper extremity;right upper extremity;left lower extremity  -AR      Left Upper Extremity Weight-Bearing weight-bearing as tolerated  -AR      Right Upper Extremity Weight-Bearing weight-bearing as tolerated  -AR      Left Lower Extremity Weight-Bearing weight-bearing as tolerated   -AR      Recorded by [AR] Kim Martinez, OT      Bed Mobility, Assessment/Treatment    Bed Mobility, Assistive Device head of bed elevated  -AR      Bed Mob, Supine to Sit, [place] contact guard assist;verbal cues required  -AR supervision required  -UD     Bed Mobility, Impairments strength decreased;ROM decreased  -AR      Bed Mobility, Comment Pt props self on elbows and pushed into uprigth sitting position  -AR      Recorded by [AR] Kim Martinez, OT [UD] Claudia Caruso PTA     Transfer Assessment/Treatment    Transfers, Sit-Stand [place] moderate assist (50% patient effort);2 person assist required;verbal cues required  -AR minimum assist (75% patient effort)  -UD     Transfers, Stand-Sit [place] moderate assist (50% patient effort);2 person assist required;verbal cues required  -AR minimum assist (75% patient effort)  -UD     Transfers, Sit-Stand-Sit, Assist Device other (see comments)   BUE support  -AR      Toilet Transfer, [place] moderate assist (50% patient effort);2 person assist required;verbal cues required  -AR      Toilet Transfer, Assistive Device other (see comments)   BUE support  -AR      Transfer, Impairments ROM decreased;strength decreased;impaired balance  -AR      Transfer, Comment pt unable to maintain upright stance. No buckling noted this date  -AR      Recorded by [AR] Kim Martinez, OT [UD] Claudia Caruso PTA     Gait Assessment/Treatment    Gait, [place] Level  other (see comments)   to chair only  -UD     Gait, Comment pt declined d/t pain with WB and concern for infection  -AR      Recorded by [AR] Kim Martinez, OT [UD] Claudia Caruso PTA     Toileting Assessment/Training    Toileting Assess/Train, Assistive Device bedside commode  -AR      Toileting Assess/Train, Position sitting  -AR      Toileting Assess/Train, Indepen Level moderate assist (50% patient effort);contact guard assist   CGA post-toilet hygiene, mod assist clothing mgmt  -AR       Toileting Assess/Train, Impairments strength decreased;impaired balance;ROM decreased  -AR      Toileting Assess/Train, Comment pt performed pivot-transfer from bed to BSC, then pivot transfer to chair  -AR      Recorded by [AR] Kim Martinez OT      Self-Feeding Assessment/Training    Self-Feeding Assess/Train, Comment pt self-feeding using built-up utensils following set-up  -AR      Recorded by [AR] Kim Martinez OT      Motor Skills/Interventions    Additional Documentation Balance Skills Training (Group)  -AR      Recorded by [AR] Kim Martinez OT      Balance Skills Training    Sitting-Level of Assistance Distant supervision  -AR      Sitting-Balance Support Feet supported;Right upper extremity supported;Left upper extremity supported  -AR      Recorded by [AR] Kim Martinez OT      Therapy Exercises    Bilateral Lower Extremities  AROM:;10 reps;sitting  -UD     Bilateral Upper Extremity 10 reps;AROM:;elbow flexion/extension;hand pumps;pronation/supination;shoulder extension/flexion;AAROM:;other reps   AAROM wrist flex/ext, limited by bandages to approx. 30   -AR      Recorded by [AR] Kim Martinez OT [UD] Claudia Caruso PTA     Fine Motor Coordination Training    Opposition Right:;Left:;impaired   can opposse to 4th digit d/t arthritic changes from RA  -AR      Recorded by [AR] Kim Martinez OT      Positioning and Restraints    Pre-Treatment Position in bed  -AR in bed  -UD     Post Treatment Position chair  -AR chair  -UD     In Chair notified nsg;reclined;call light within reach;encouraged to call for assist;exit alarm on;with family/caregiver;legs elevated  -AR notified nsg;sitting;call light within reach  -UD     Recorded by [AR] Kim Martinez OT [UD] Claudia Caruso PTA       User Key  (r) = Recorded By, (t) = Taken By, (c) = Cosigned By    Initials Name Effective Dates    TREMAINE Caruso PTA 06/22/15 -     AR Kim Martinez OT 06/22/15 -                  OT Goals       10/11/17 1446 10/09/17 0835       Bed Mobility OT LTG    Bed Mobility OT LTG, Time to Achieve  2 wks  -ST     Bed Mobility OT LTG, Activity Type  supine to sit/sit to supine  -ST     Bed Mobility OT LTG, [place] Level  contact guard assist  -ST     Bed Mobility OT LTG, Assist Device  bed rails  -ST     Bed Mobility OT LTG, Outcome goal ongoing  -AR goal ongoing  -ST     Transfer Training OT LTG    Transfer Training OT LTG, Time to Achieve  2 wks  -ST     Transfer Training OT LTG, Activity Type  toilet;sit to stand/stand to sit;bed to chair /chair to bed  -ST     Transfer Training OT LTG, [place] Level  minimum assist (75% patient effort);2 person assist required  -ST     Transfer Training OT LTG, Assist Device  walker, rolling platform  -ST     Transfer Training OT LTG, Outcome goal ongoing  -AR goal ongoing  -ST     Strength OT LTG    Strength Goal OT LTG, Time to Achieve  2 wks  -ST     Strength Goal OT LTG, Functional Goal  Pt will be min A with hand strengthening and coordination exercises by d/c to promote increased independence with daily tasks.   -ST     Strength Goal OT LTG, Outcome goal ongoing  -AR goal ongoing  -ST     UB Dressing OT LTG    UB Dressing Goal OT LTG, Time to Achieve  2 wks  -ST     UB Dressing Goal OT LTG, [place] Level  minimum assist (75% patient effort)  -ST     UB Dressing Goal OT LTG, Outcome goal ongoing  -AR goal ongoing  -ST       User Key  (r) = Recorded By, (t) = Taken By, (c) = Cosigned By    Initials Name Provider Type     Whitney Nails OTR Occupational Therapist    LINDA Martinez, OT Occupational Therapist          Occupational Therapy Education     Title: PT OT SLP Therapies (Active)     Topic: Occupational Therapy (Done)     Point: ADL training (Done)    Description: Instruct learner(s) on proper safety adaptation and remediation techniques during self care or transfers.   Instruct in proper use of assistive devices.    Learning  Progress Summary    Learner Readiness Method Response Comment Documented by Status   Patient Eager E,D,TB VU,NR reviewed bed mobility, transfer training, ADL retraining, BUE HEP AR 10/11/17 1446 Done    Acceptance CEZAR,ANDRA CHU DU role of OT, benefits of activity, HEP, AE, bed mobility, transfers ST 10/09/17 1459 Done   Family Eager EANDRA,TB KAJAL,NR reviewed bed mobility, transfer training, ADL retraining, BUE HEP AR 10/11/17 1446 Done               Point: Home exercise program (Done)    Description: Instruct learner(s) on appropriate technique for monitoring, assisting and/or progressing therapeutic exercises/activities.    Learning Progress Summary    Learner Readiness Method Response Comment Documented by Status   Patient Eager ANDRA ROBB,KIMBERLEY RDZ,NR reviewed bed mobility, transfer training, ADL retraining, BUE HEP AR 10/11/17 1446 Done    Acceptance KIMBERLEY ROBB D VU, DU role of OT, benefits of activity, HEP, AE, bed mobility, transfers ST 10/09/17 1459 Done   Family Eager ANDRA ROBB,KIMBERLEY RDZ,NR reviewed bed mobility, transfer training, ADL retraining, BUE HEP AR 10/11/17 1446 Done               Point: Precautions (Done)    Description: Instruct learner(s) on prescribed precautions during self-care and functional transfers.    Learning Progress Summary    Learner Readiness Method Response Comment Documented by Status   Patient [place]er ANDRA ROBB,TB VU,NR reviewed bed mobility, transfer training, ADL retraining, BUE HEP AR 10/11/17 1446 Done    Acceptance KIMBERLEY ROBB D VU, DU role of OT, benefits of activity, HEP, AE, bed mobility, transfers ST 10/09/17 1459 Done   Family Eager EANDRA,TB KAJAL,NR reviewed bed mobility, transfer training, ADL retraining, BUE HEP AR 10/11/17 1446 Done               Point: Body mechanics (Done)    Description: Instruct learner(s) on proper positioning and spine alignment during self-care, functional mobility activities and/or exercises.    Learning Progress Summary    Learner Readiness Method Response Comment Documented by Status   Patient  ANDRA Schultz,KIMBERLEY RDZ,NR reviewed bed mobility, transfer training, ADL retraining, BUE HEP AR 10/11/17 1446 Done    Acceptance KIMBERLEY ROBB D VU, DU role of OT, benefits of activity, HEP, AE, bed mobility, transfers  10/09/17 1459 Done   Family ANDRA Schultz TB VU,NR reviewed bed mobility, transfer training, ADL retraining, BUE HEP AR 10/11/17 1446 Done                      User Key     Initials Effective Dates Name Provider Type Discipline     02/20/17 -  Whitney Nails, OTR Occupational Therapist OT    AR 06/22/15 -  Kim Martinez, OT Occupational Therapist OT                OT Recommendation and Plan  Anticipated Equipment Needs At Discharge: bedside commode (TBD further, has reacher)  Anticipated Discharge Disposition: skilled nursing facility  Planned Therapy Interventions: activity intolerance, adaptive equipment training, ADL retraining, IADL retraining, balance training, bed mobility training, fine motor coordination training, home exercise program, ROM (Range of Motion), strengthening, stretching, transfer training  Therapy Frequency: daily  Plan of Care Review  Plan Of Care Reviewed With: patient, daughter  Progress: progress towards functional goals is fair  Outcome Summary/Follow up Plan: Pt limited with pain in BUE/BLE as well as weakness, Pt performed pivot-transfers with mod assist x 2 . Pt particiapted in A/AAROM BUE. Recommend IP rehab.            Outcome Measures       10/11/17 1130 10/11/17 1120 10/09/17 0850    How much help from another person do you currently need...    Turning from your back to your side while in flat bed without using bedrails?  3  -UD 2  -EH    Moving from lying on back to sitting on the side of a flat bed without bedrails?  4  -UD 2  -EH    Moving to and from a bed to a chair (including a wheelchair)?  3  -UD 2  -EH    Standing up from a chair using your arms (e.g., wheelchair, bedside chair)?  2  -UD 2  -EH    Climbing 3-5 steps with a railing?  1  -UD 1  -EH    To walk in  hospital room?  1  -UD 1  -EH    AM-PAC 6 Clicks Score  14  -UD 10  -EH    How much help from another is currently needed...    Putting on and taking off regular lower body clothing? 1  -AR      Bathing (including washing, rinsing, and drying) 2  -AR      Toileting (which includes using toilet bed pan or urinal) 2  -AR      Putting on and taking off regular upper body clothing 2  -AR      Taking care of personal grooming (such as brushing teeth) 2  -AR      Eating meals 3  -AR      Score 12  -AR      Functional Assessment    Outcome Measure Options AM-PAC 6 Clicks Daily Activity (OT)  -AR  AM-PAC 6 Clicks Basic Mobility (PT)  -EH      10/09/17 0835          How much help from another is currently needed...    Putting on and taking off regular lower body clothing? 1  -ST      Bathing (including washing, rinsing, and drying) 1  -ST      Toileting (which includes using toilet bed pan or urinal) 1  -ST      Putting on and taking off regular upper body clothing 1  -ST      Taking care of personal grooming (such as brushing teeth) 2  -ST      Eating meals 3  -ST      Score 9  -ST      Functional Assessment    Outcome Measure Options AM-PAC 6 Clicks Daily Activity (OT)  -ST        User Key  (r) = Recorded By, (t) = Taken By, (c) = Cosigned By    Initials Name Provider Type    UD Claudia Caruso, PTA Physical Therapy Assistant     Dori Mancini, PT Physical Therapist    ST Whitney Nails, OTR Occupational Therapist    LINDA Martinez, OT Occupational Therapist           Time Calculation:          Time Calculation- OT       10/11/17 1449          Time Calculation- OT    OT Start Time 0130  -AR      Total Timed Code Minutes- OT 19 minute(s)  -AR      OT Received On 10/11/17  -AR      OT Goal Re-Cert Due Date 10/19/17  -AR        User Key  (r) = Recorded By, (t) = Taken By, (c) = Cosigned By    Initials Name Provider Type    LINDA Martinez, OT Occupational Therapist          Therapy Charges for Today      Code Description Service Date Service Provider Modifiers Qty    [id number]  OT THERAPEUTIC ACT EA 15 MIN 10/11/2017 Kim Martinez OT GO 1                    Kim Martinez OT  10/11/2017

## 2017-10-11 NOTE — PROGRESS NOTES
Pharmacy Consult-Vancomycin Dosing    Richard Guevara is a 58 yo man admitted 10/7/17 for pyogenic arthritis of both wrists and his left knee.  He has a history of RA and is on chronic immunosuppressants.  Vancomycin and piperacillin/tazobactam were started empirically.  Pt to OR 10/8/17 for surgical arthrotomy of both wrists and left knee with irrigation and debridement.  Preliminary cultures are growing staph aureus.    ID Consult: Dr Nunes    Goal Trough: 15 mcg/ml    Current Antimicrobial Therapy  IV Anti-Infectives       Ordered     Dose/Rate Route Frequency Start Stop      10/11/17 1359  vancomycin IVPB 1250 mg in 250 mL NS (premix)     Ordering Provider:  Lila Anne RPH    1,250 mg  over 90 Minutes Intravenous Every 8 Hours Scheduled 10/11/17 1400      10/09/17 1738  cefTRIAXone (ROCEPHIN) IVPB 2 g     Ordering Provider:  Quentin Nunes MD    2 g  over 30 Minutes Intravenous Every 24 Hours 10/09/17 1800      10/08/17 1408  ceFAZolin in dextrose (ANCEF) IVPB solution 2 g     Ordering Provider:  Addy Ortiz MD    2 g Intravenous Once 10/09/17 0600 10/09/17 0616    10/08/17 0433  Pharmacy to dose vancomycin     Ordering Provider:  ZAC Foster     Does not apply Continuous PRN 10/08/17 0419      10/07/17 2346  vancomycin IVPB 2000 mg in 0.9% Sodium Chloride (premix) 500 mL     Ordering Provider:  ZAC Beyer    20 mg/kg × 95.3 kg Intravenous Once 10/07/17 2348 10/08/17 0311    10/07/17 2346  piperacillin-tazobactam (ZOSYN) 4.5 g/100 mL 0.9% NS IVPB (mbp)     Ordering Provider:  ZAC Beyer    4.5 g Intravenous Once 10/07/17 2348 10/08/17 0150        Allergies  Allergies as of 10/07/2017   • (No Known Allergies)     Labs  Results from last 7 days   Lab Units 10/11/17  0659 10/10/17  0424 10/09/17  0550   BUN mg/dL 6* 9 11   CREATININE mg/dL 0.50* 0.60 0.70   Results from last 7 days   Lab Units 10/11/17  0659 10/08/17  0515 10/07/17  2103   WBC 10*3/mm3 5.60 4.99  "4.94     Evaluation of Dosing     Ht - 72\" (182.9 cm)  Wt - 229 lb 9.6 oz (104 kg)    Estimated Creatinine Clearance: 205.8 mL/min (by C-G formula based on Cr of 0.5).    I/O last 3 completed shifts:  In: 4089 [P.O.:840; I.V.:1749; IV Piggyback:1500]  Out: 6000 [Urine:6000]    Microbiology and Radiology  Microbiology Results (last 10 days)       Procedure Component Value - Date/Time      Body Fluid Culture - Synovial Fluid, Wrist, Right [[id number]]  (Abnormal) Collected:  10/08/17 0140    Lab Status:  Preliminary result Specimen:  Synovial Fluid from Wrist, Right Updated:  10/09/17 1050     BF Culture --      Moderate growth (3+) Staphylococcus aureus (A)     Gram Stain Result Many (4+) WBCs seen      No organisms seen    Body Fluid Culture - Synovial Fluid, Knee, Left [[id number]]  (Abnormal)  (Susceptibility) Collected:  10/08/17 0130    Lab Status:  Preliminary result Specimen:  Synovial Fluid from Knee, Left Updated:  10/10/17 1423     BF Culture --      Moderate growth (3+) Staphylococcus aureus (A)     Gram Stain Result Many (4+) WBCs seen      No organisms seen    Susceptibility        Staphylococcus aureus     IVAN (Preliminary)     Ceftriaxone <=8 ug/ml Susceptible     Clindamycin <=0.5 ug/ml Susceptible     Daptomycin <=0.5 ug/ml Susceptible     Erythromycin <=0.5 ug/ml Susceptible     Gentamicin <=4 ug/ml Susceptible     Levofloxacin <=1 ug/ml Susceptible  [1]      Linezolid 2 ug/ml Susceptible     Oxacillin <=0.25 ug/ml Susceptible     Penicillin G Resistant     Quinupristin + Dalfopristin <=0.5 ug/ml Susceptible     Rifampin <=1 ug/ml Susceptible     Tetracycline <=4 ug/ml Susceptible     Trimethoprim + Sulfamethoxazole <=0.5/9.5 ug/ml Susceptible     Vancomycin 2 ug/ml Susceptible              [1]   Staphylococcus species may develop resistance during prolonged therapy with quinolones.  Isolates that are initially susceptible may become resistant within three to four days after initiation of therapy. " Testing of repeat isolates may be warranted.                   Body Fluid Culture - Synovial Fluid, Wrist, Left [[id number]]  (Abnormal)  (Susceptibility) Collected:  10/08/17 0100    Lab Status:  Preliminary result Specimen:  Synovial Fluid from Wrist, Left Updated:  10/10/17 1425     BF Culture --      Heavy growth (4+) Staphylococcus aureus (A)     Gram Stain Result Many (4+) WBCs seen      No organisms seen    Susceptibility        Staphylococcus aureus     IVAN (Preliminary)     Ceftriaxone <=8 ug/ml Susceptible     Clindamycin <=0.5 ug/ml Susceptible     Daptomycin <=0.5 ug/ml Susceptible     Erythromycin <=0.5 ug/ml Susceptible     Gentamicin <=4 ug/ml Susceptible     Levofloxacin <=1 ug/ml Susceptible  [1]      Linezolid 2 ug/ml Susceptible     Oxacillin <=0.25 ug/ml Susceptible     Penicillin G Resistant     Quinupristin + Dalfopristin <=0.5 ug/ml Susceptible     Rifampin <=1 ug/ml Susceptible     Tetracycline <=4 ug/ml Susceptible     Trimethoprim + Sulfamethoxazole <=0.5/9.5 ug/ml Susceptible     Vancomycin 2 ug/ml Susceptible              [1]   Staphylococcus species may develop resistance during prolonged therapy with quinolones.  Isolates that are initially susceptible may become resistant within three to four days after initiation of therapy. Testing of repeat isolates may be warranted.                   Blood Culture - Blood, [[id number]]  (Normal) Collected:  10/07/17 2141    Lab Status:  Preliminary result Specimen:  Blood from Arm, Right Updated:  10/10/17 2201     Blood Culture No growth at 3 days    Blood Culture - Blood, [[id number]]  (Normal) Collected:  10/07/17 2141    Lab Status:  Preliminary result Specimen:  Blood from Arm, Right Updated:  10/10/17 2201     Blood Culture No growth at 3 days        Evaluation of Level    Lab Results   Component Value Date    VANCOTROUGH 9.50 (L) 10/11/2017   Drawn at 1304    Assessment/Plan:     1. Patient started on Vancomycin 10/8 2gm at 0311.  Dose on 10/8  at 1400 was not given, apparently on MAR hold at that time.  2. Vancomycin 1.5 grams IV Q12H beginning 10/9.   3. Vancomycin trough subtherapeutic.  Recommend increase vancomycin to 1.25 grams IV q8h.  4. Recheck vancomycin trough 10/13 am  Pharmacy will continue to follow the patient's clinical progress and make adjustments as needed.    Thank you  Lila Anne, PharmD

## 2017-10-11 NOTE — PLAN OF CARE
Problem: Patient Care Overview (Adult)  Goal: Plan of Care Review  Outcome: Ongoing (interventions implemented as appropriate)    10/11/17 0401   Coping/Psychosocial Response Interventions   Plan Of Care Reviewed With patient;daughter   Patient Care Overview   Progress no change   Outcome Evaluation   Outcome Summary/Follow up Plan VSS. Pt c/o of pain at beginning of shift which seemed to be resolved by PRN and scheduled pain medication. Pt did not rest very well, and pt reports that melatonin does not help. Pt is slightly confused, but is A&Ox4. Pt is continuing to gain mobility in his hands.        Goal: Discharge Needs Assessment  Outcome: Ongoing (interventions implemented as appropriate)    10/09/17 1546 10/11/17 0401   Discharge Needs Assessment   Concerns To Be Addressed --  discharge planning concerns   Readmission Within The Last 30 Days --  no previous admission in last 30 days   Equipment Needed After Discharge --  none   Discharge Disposition --  still a patient;home healthcare service;rehab facility   Current Health   Anticipated Changes Related to Illness --  none   Living Environment   Transportation Available --  car;family or friend will provide   Self-Care   Equipment Currently Used at Home wheelchair;walker, standard;cane, straight --          Problem: Pain, Acute (Adult)  Goal: Acceptable Pain Control/Comfort Level  Outcome: Ongoing (interventions implemented as appropriate)    10/11/17 0401   Pain, Acute (Adult)   Acceptable Pain Control/Comfort Level making progress toward outcome         Problem: Skin Integrity Impairment, Risk/Actual (Adult)  Goal: Skin Integrity/Wound Healing  Outcome: Ongoing (interventions implemented as appropriate)    10/11/17 0401   Skin Integrity Impairment, Risk/Actual (Adult)   Skin Integrity/Wound Healing making progress toward outcome

## 2017-10-11 NOTE — PROGRESS NOTES
Continued Stay Note  [facility]     Patient Name: Richard Guevara  MRN: [id number]  Today's Date: 10/11/2017    Admit Date: 10/7/2017          Discharge Plan       10/11/17 1445    Case Management/Social Work Plan    Plan [facility]    Patient/Family In Agreement With Plan yes    Additional Comments I received a call from Dr Martin. She has arranged transfer to [facility] today. Dr Martin states the pt is to be sent to the  ER where Dr Duncan will see the pt and arrange admission. [facility] ambulance to transport at 1600. The pt and family are in agreement. Will need to send the copied chart and DC summary with the pt.              Discharge Codes       10/11/17 1442    Discharge Codes    Discharge Codes 02  Discharged/transferred to short-term acute care hospital        Expected Discharge Date and Time     Expected Discharge Date Expected Discharge Time    Oct 11, 2017             Wendie Ocampo RN

## 2017-10-12 LAB
BACTERIA SPEC AEROBE CULT: NORMAL
BACTERIA SPEC AEROBE CULT: NORMAL

## 2017-10-22 LAB
BACTERIA FLD CULT: ABNORMAL
GRAM STN SPEC: ABNORMAL

## 2017-10-24 ENCOUNTER — TELEPHONE (OUTPATIENT)
Dept: ORTHOPEDIC SURGERY | Facility: CLINIC | Age: 57
End: 2017-10-24

## 2017-11-06 ENCOUNTER — LAB REQUISITION (OUTPATIENT)
Dept: LAB | Facility: HOSPITAL | Age: 57
End: 2017-11-06

## 2017-11-06 DIAGNOSIS — R65.20 SEVERE SEPSIS WITHOUT SEPTIC SHOCK (CODE) (HCC): ICD-10-CM

## 2017-11-06 LAB — VANCOMYCIN TROUGH SERPL-MCNC: 8.6 MCG/ML (ref 5–15)

## 2017-11-06 PROCEDURE — 80202 ASSAY OF VANCOMYCIN: CPT | Performed by: INTERNAL MEDICINE

## 2017-11-10 ENCOUNTER — LAB REQUISITION (OUTPATIENT)
Dept: LAB | Facility: HOSPITAL | Age: 57
End: 2017-11-10

## 2017-11-10 DIAGNOSIS — A41.9 SEPSIS (HCC): ICD-10-CM

## 2017-11-10 LAB
ANION GAP SERPL CALCULATED.3IONS-SCNC: 6.3 MMOL/L (ref 3.6–11.2)
BASOPHILS # BLD AUTO: 0.02 10*3/MM3 (ref 0–0.3)
BASOPHILS NFR BLD AUTO: 0.3 % (ref 0–2)
BUN BLD-MCNC: 10 MG/DL (ref 7–21)
BUN/CREAT SERPL: 11.4 (ref 7–25)
CALCIUM SPEC-SCNC: 8.2 MG/DL (ref 7.7–10)
CHLORIDE SERPL-SCNC: 103 MMOL/L (ref 99–112)
CO2 SERPL-SCNC: 26.7 MMOL/L (ref 24.3–31.9)
CREAT BLD-MCNC: 0.88 MG/DL (ref 0.43–1.29)
CRP SERPL-MCNC: 3.71 MG/DL (ref 0–0.99)
DEPRECATED RDW RBC AUTO: 45.3 FL (ref 37–54)
EOSINOPHIL # BLD AUTO: 0.2 10*3/MM3 (ref 0–0.7)
EOSINOPHIL NFR BLD AUTO: 3 % (ref 0–5)
ERYTHROCYTE [DISTWIDTH] IN BLOOD BY AUTOMATED COUNT: 13.2 % (ref 11.5–14.5)
GFR SERPL CREATININE-BSD FRML MDRD: 90 ML/MIN/1.73
GLUCOSE BLD-MCNC: 141 MG/DL (ref 70–110)
HCT VFR BLD AUTO: 33.8 % (ref 42–52)
HGB BLD-MCNC: 11.6 G/DL (ref 14–18)
IMM GRANULOCYTES # BLD: 0.03 10*3/MM3 (ref 0–0.03)
IMM GRANULOCYTES NFR BLD: 0.5 % (ref 0–0.5)
LYMPHOCYTES # BLD AUTO: 2.65 10*3/MM3 (ref 1–3)
LYMPHOCYTES NFR BLD AUTO: 39.8 % (ref 21–51)
MCH RBC QN AUTO: 34 PG (ref 27–33)
MCHC RBC AUTO-ENTMCNC: 34.3 G/DL (ref 33–37)
MCV RBC AUTO: 99.1 FL (ref 80–94)
MONOCYTES # BLD AUTO: 1 10*3/MM3 (ref 0.1–0.9)
MONOCYTES NFR BLD AUTO: 15 % (ref 0–10)
NEUTROPHILS # BLD AUTO: 2.76 10*3/MM3 (ref 1.4–6.5)
NEUTROPHILS NFR BLD AUTO: 41.4 % (ref 30–70)
OSMOLALITY SERPL CALC.SUM OF ELEC: 273.4 MOSM/KG (ref 273–305)
PLATELET # BLD AUTO: 387 10*3/MM3 (ref 130–400)
PMV BLD AUTO: 9.1 FL (ref 6–10)
POTASSIUM BLD-SCNC: 4 MMOL/L (ref 3.5–5.3)
RBC # BLD AUTO: 3.41 10*6/MM3 (ref 4.7–6.1)
SODIUM BLD-SCNC: 136 MMOL/L (ref 135–153)
VANCOMYCIN TROUGH SERPL-MCNC: 25.2 MCG/ML (ref 5–15)
WBC NRBC COR # BLD: 6.66 10*3/MM3 (ref 4.5–12.5)

## 2017-11-10 PROCEDURE — 85025 COMPLETE CBC W/AUTO DIFF WBC: CPT | Performed by: INTERNAL MEDICINE

## 2017-11-10 PROCEDURE — 80202 ASSAY OF VANCOMYCIN: CPT | Performed by: INTERNAL MEDICINE

## 2017-11-10 PROCEDURE — 80048 BASIC METABOLIC PNL TOTAL CA: CPT | Performed by: INTERNAL MEDICINE

## 2017-11-10 PROCEDURE — 86140 C-REACTIVE PROTEIN: CPT | Performed by: INTERNAL MEDICINE

## 2017-11-13 ENCOUNTER — LAB REQUISITION (OUTPATIENT)
Dept: LAB | Facility: HOSPITAL | Age: 57
End: 2017-11-13

## 2017-11-13 DIAGNOSIS — M19.079 PRIMARY OSTEOARTHRITIS, UNSPECIFIED ANKLE AND FOOT: ICD-10-CM

## 2017-11-13 LAB
ALBUMIN SERPL-MCNC: 3.4 G/DL (ref 3.5–5)
ALBUMIN/GLOB SERPL: 0.9 G/DL (ref 1.5–2.5)
ALP SERPL-CCNC: 123 U/L (ref 40–129)
ALT SERPL W P-5'-P-CCNC: 38 U/L (ref 10–44)
ANION GAP SERPL CALCULATED.3IONS-SCNC: 7.6 MMOL/L (ref 3.6–11.2)
AST SERPL-CCNC: 29 U/L (ref 10–34)
BASOPHILS # BLD AUTO: 0.02 10*3/MM3 (ref 0–0.3)
BASOPHILS NFR BLD AUTO: 0.4 % (ref 0–2)
BILIRUB SERPL-MCNC: 0.4 MG/DL (ref 0.2–1.8)
BUN BLD-MCNC: 11 MG/DL (ref 7–21)
BUN/CREAT SERPL: 11.7 (ref 7–25)
CALCIUM SPEC-SCNC: 8.7 MG/DL (ref 7.7–10)
CHLORIDE SERPL-SCNC: 101 MMOL/L (ref 99–112)
CO2 SERPL-SCNC: 25.4 MMOL/L (ref 24.3–31.9)
CREAT BLD-MCNC: 0.94 MG/DL (ref 0.43–1.29)
CRP SERPL-MCNC: 6.98 MG/DL (ref 0–0.99)
DEPRECATED RDW RBC AUTO: 43.8 FL (ref 37–54)
EOSINOPHIL # BLD AUTO: 0.2 10*3/MM3 (ref 0–0.7)
EOSINOPHIL NFR BLD AUTO: 4 % (ref 0–5)
ERYTHROCYTE [DISTWIDTH] IN BLOOD BY AUTOMATED COUNT: 13.1 % (ref 11.5–14.5)
GFR SERPL CREATININE-BSD FRML MDRD: 83 ML/MIN/1.73
GLOBULIN UR ELPH-MCNC: 3.7 GM/DL
GLUCOSE BLD-MCNC: 150 MG/DL (ref 70–110)
HCT VFR BLD AUTO: 36.6 % (ref 42–52)
HGB BLD-MCNC: 11.9 G/DL (ref 14–18)
IMM GRANULOCYTES # BLD: 0.02 10*3/MM3 (ref 0–0.03)
IMM GRANULOCYTES NFR BLD: 0.4 % (ref 0–0.5)
LYMPHOCYTES # BLD AUTO: 2.67 10*3/MM3 (ref 1–3)
LYMPHOCYTES NFR BLD AUTO: 53.8 % (ref 21–51)
MCH RBC QN AUTO: 31.3 PG (ref 27–33)
MCHC RBC AUTO-ENTMCNC: 32.5 G/DL (ref 33–37)
MCV RBC AUTO: 96.3 FL (ref 80–94)
MONOCYTES # BLD AUTO: 0.91 10*3/MM3 (ref 0.1–0.9)
MONOCYTES NFR BLD AUTO: 18.3 % (ref 0–10)
NEUTROPHILS # BLD AUTO: 1.14 10*3/MM3 (ref 1.4–6.5)
NEUTROPHILS NFR BLD AUTO: 23.1 % (ref 30–70)
OSMOLALITY SERPL CALC.SUM OF ELEC: 270.5 MOSM/KG (ref 273–305)
PLATELET # BLD AUTO: 404 10*3/MM3 (ref 130–400)
PMV BLD AUTO: 9.1 FL (ref 6–10)
POTASSIUM BLD-SCNC: 4.5 MMOL/L (ref 3.5–5.3)
PROT SERPL-MCNC: 7.1 G/DL (ref 6–8)
RBC # BLD AUTO: 3.8 10*6/MM3 (ref 4.7–6.1)
SODIUM BLD-SCNC: 134 MMOL/L (ref 135–153)
VANCOMYCIN TROUGH SERPL-MCNC: 18.3 MCG/ML (ref 5–15)
WBC NRBC COR # BLD: 4.96 10*3/MM3 (ref 4.5–12.5)

## 2017-11-13 PROCEDURE — 80202 ASSAY OF VANCOMYCIN: CPT | Performed by: INTERNAL MEDICINE

## 2017-11-13 PROCEDURE — 85025 COMPLETE CBC W/AUTO DIFF WBC: CPT | Performed by: INTERNAL MEDICINE

## 2017-11-13 PROCEDURE — 86140 C-REACTIVE PROTEIN: CPT | Performed by: INTERNAL MEDICINE

## 2017-11-13 PROCEDURE — 80053 COMPREHEN METABOLIC PANEL: CPT | Performed by: INTERNAL MEDICINE

## 2017-11-20 ENCOUNTER — LAB REQUISITION (OUTPATIENT)
Dept: LAB | Facility: HOSPITAL | Age: 57
End: 2017-11-20

## 2017-11-20 DIAGNOSIS — M06.89 OTHER SPECIFIED RHEUMATOID ARTHRITIS, MULTIPLE SITES (HCC): ICD-10-CM

## 2017-11-20 LAB
ALBUMIN SERPL-MCNC: 3.5 G/DL (ref 3.5–5)
ALBUMIN/GLOB SERPL: 1.1 G/DL (ref 1.5–2.5)
ALP SERPL-CCNC: 133 U/L (ref 40–129)
ALT SERPL W P-5'-P-CCNC: 45 U/L (ref 10–44)
ANION GAP SERPL CALCULATED.3IONS-SCNC: 6.8 MMOL/L (ref 3.6–11.2)
AST SERPL-CCNC: 28 U/L (ref 10–34)
BASOPHILS # BLD AUTO: 0.02 10*3/MM3 (ref 0–0.3)
BASOPHILS NFR BLD AUTO: 0.4 % (ref 0–2)
BILIRUB SERPL-MCNC: 0.3 MG/DL (ref 0.2–1.8)
BUN BLD-MCNC: 7 MG/DL (ref 7–21)
BUN/CREAT SERPL: 8.6 (ref 7–25)
CALCIUM SPEC-SCNC: 8.5 MG/DL (ref 7.7–10)
CHLORIDE SERPL-SCNC: 104 MMOL/L (ref 99–112)
CO2 SERPL-SCNC: 27.2 MMOL/L (ref 24.3–31.9)
CREAT BLD-MCNC: 0.81 MG/DL (ref 0.43–1.29)
DEPRECATED RDW RBC AUTO: 44.8 FL (ref 37–54)
EOSINOPHIL # BLD AUTO: 0.2 10*3/MM3 (ref 0–0.7)
EOSINOPHIL NFR BLD AUTO: 4.3 % (ref 0–5)
ERYTHROCYTE [DISTWIDTH] IN BLOOD BY AUTOMATED COUNT: 13.4 % (ref 11.5–14.5)
ERYTHROCYTE [SEDIMENTATION RATE] IN BLOOD: 77 MM/HR (ref 0–20)
GFR SERPL CREATININE-BSD FRML MDRD: 99 ML/MIN/1.73
GLOBULIN UR ELPH-MCNC: 3.3 GM/DL
GLUCOSE BLD-MCNC: 83 MG/DL (ref 70–110)
HCT VFR BLD AUTO: 35.5 % (ref 42–52)
HGB BLD-MCNC: 11.4 G/DL (ref 14–18)
IMM GRANULOCYTES # BLD: 0.07 10*3/MM3 (ref 0–0.03)
IMM GRANULOCYTES NFR BLD: 1.5 % (ref 0–0.5)
LYMPHOCYTES # BLD AUTO: 2.18 10*3/MM3 (ref 1–3)
LYMPHOCYTES NFR BLD AUTO: 46.6 % (ref 21–51)
MCH RBC QN AUTO: 29.8 PG (ref 27–33)
MCHC RBC AUTO-ENTMCNC: 32.1 G/DL (ref 33–37)
MCV RBC AUTO: 92.7 FL (ref 80–94)
MONOCYTES # BLD AUTO: 1.61 10*3/MM3 (ref 0.1–0.9)
MONOCYTES NFR BLD AUTO: 34.4 % (ref 0–10)
NEUTROPHILS # BLD AUTO: 0.6 10*3/MM3 (ref 1.4–6.5)
NEUTROPHILS NFR BLD AUTO: 12.8 % (ref 30–70)
OSMOLALITY SERPL CALC.SUM OF ELEC: 272.8 MOSM/KG (ref 273–305)
PLAT MORPH BLD: NORMAL
PLATELET # BLD AUTO: 386 10*3/MM3 (ref 130–400)
PMV BLD AUTO: 9.2 FL (ref 6–10)
POTASSIUM BLD-SCNC: 4.6 MMOL/L (ref 3.5–5.3)
PROT SERPL-MCNC: 6.8 G/DL (ref 6–8)
RBC # BLD AUTO: 3.83 10*6/MM3 (ref 4.7–6.1)
RBC MORPH BLD: NORMAL
SODIUM BLD-SCNC: 138 MMOL/L (ref 135–153)
WBC NRBC COR # BLD: 4.68 10*3/MM3 (ref 4.5–12.5)

## 2017-11-20 PROCEDURE — 80053 COMPREHEN METABOLIC PANEL: CPT | Performed by: INTERNAL MEDICINE

## 2017-11-20 PROCEDURE — 80202 ASSAY OF VANCOMYCIN: CPT | Performed by: INTERNAL MEDICINE

## 2017-11-20 PROCEDURE — 85025 COMPLETE CBC W/AUTO DIFF WBC: CPT | Performed by: INTERNAL MEDICINE

## 2017-11-20 PROCEDURE — 85007 BL SMEAR W/DIFF WBC COUNT: CPT | Performed by: INTERNAL MEDICINE

## 2017-11-20 PROCEDURE — 85652 RBC SED RATE AUTOMATED: CPT | Performed by: INTERNAL MEDICINE

## 2017-11-21 LAB — VANCOMYCIN TROUGH SERPL-MCNC: 17.2 MCG/ML (ref 5–15)

## 2017-11-27 ENCOUNTER — LAB REQUISITION (OUTPATIENT)
Dept: LAB | Facility: HOSPITAL | Age: 57
End: 2017-11-27

## 2017-11-27 DIAGNOSIS — M06.89 OTHER SPECIFIED RHEUMATOID ARTHRITIS, MULTIPLE SITES (HCC): ICD-10-CM

## 2017-11-27 LAB
ALBUMIN SERPL-MCNC: 3.6 G/DL (ref 3.5–5)
ALBUMIN/GLOB SERPL: 1.2 G/DL (ref 1.5–2.5)
ALP SERPL-CCNC: 122 U/L (ref 40–129)
ALT SERPL W P-5'-P-CCNC: 43 U/L (ref 10–44)
ANION GAP SERPL CALCULATED.3IONS-SCNC: 4.6 MMOL/L (ref 3.6–11.2)
AST SERPL-CCNC: 29 U/L (ref 10–34)
BASOPHILS # BLD MANUAL: 0.08 10*3/MM3 (ref 0–0.3)
BASOPHILS NFR BLD AUTO: 2 % (ref 0–2)
BILIRUB SERPL-MCNC: 0.2 MG/DL (ref 0.2–1.8)
BUN BLD-MCNC: 8 MG/DL (ref 7–21)
BUN/CREAT SERPL: 9.4 (ref 7–25)
CALCIUM SPEC-SCNC: 8.8 MG/DL (ref 7.7–10)
CHLORIDE SERPL-SCNC: 104 MMOL/L (ref 99–112)
CO2 SERPL-SCNC: 28.4 MMOL/L (ref 24.3–31.9)
CREAT BLD-MCNC: 0.85 MG/DL (ref 0.43–1.29)
DEPRECATED RDW RBC AUTO: 44.7 FL (ref 37–54)
EOSINOPHIL # BLD MANUAL: 0.04 10*3/MM3 (ref 0–0.7)
EOSINOPHIL NFR BLD MANUAL: 1 % (ref 0–5)
ERYTHROCYTE [DISTWIDTH] IN BLOOD BY AUTOMATED COUNT: 13.4 % (ref 11.5–14.5)
ERYTHROCYTE [SEDIMENTATION RATE] IN BLOOD: 81 MM/HR (ref 0–20)
GFR SERPL CREATININE-BSD FRML MDRD: 93 ML/MIN/1.73
GLOBULIN UR ELPH-MCNC: 3.1 GM/DL
GLUCOSE BLD-MCNC: 103 MG/DL (ref 70–110)
HCT VFR BLD AUTO: 35.2 % (ref 42–52)
HGB BLD-MCNC: 11.5 G/DL (ref 14–18)
LYMPHOCYTES # BLD MANUAL: 2.07 10*3/MM3 (ref 1–3)
LYMPHOCYTES NFR BLD MANUAL: 33 % (ref 0–10)
LYMPHOCYTES NFR BLD MANUAL: 52 % (ref 21–51)
MCH RBC QN AUTO: 29.9 PG (ref 27–33)
MCHC RBC AUTO-ENTMCNC: 32.7 G/DL (ref 33–37)
MCV RBC AUTO: 91.4 FL (ref 80–94)
MONOCYTES # BLD AUTO: 1.32 10*3/MM3 (ref 0.1–0.9)
NEUTROPHILS # BLD AUTO: 0.48 10*3/MM3 (ref 1.4–6.5)
NEUTROPHILS NFR BLD MANUAL: 11 % (ref 30–70)
NEUTS BAND NFR BLD MANUAL: 1 % (ref 4–12)
OSMOLALITY SERPL CALC.SUM OF ELEC: 272.4 MOSM/KG (ref 273–305)
PLATELET # BLD AUTO: 413 10*3/MM3 (ref 130–400)
PMV BLD AUTO: 9.3 FL (ref 6–10)
POTASSIUM BLD-SCNC: 4.3 MMOL/L (ref 3.5–5.3)
PROT SERPL-MCNC: 6.7 G/DL (ref 6–8)
RBC # BLD AUTO: 3.85 10*6/MM3 (ref 4.7–6.1)
RBC MORPH BLD: NORMAL
SCAN SLIDE: NORMAL
SMALL PLATELETS BLD QL SMEAR: ABNORMAL
SODIUM BLD-SCNC: 137 MMOL/L (ref 135–153)
URATE SERPL-MCNC: 5.9 MG/DL (ref 3.7–7)
VANCOMYCIN TROUGH SERPL-MCNC: 22.7 MCG/ML (ref 5–15)
WBC NRBC COR # BLD: 3.99 10*3/MM3 (ref 4.5–12.5)

## 2017-11-27 PROCEDURE — 80053 COMPREHEN METABOLIC PANEL: CPT | Performed by: INTERNAL MEDICINE

## 2017-11-27 PROCEDURE — 80202 ASSAY OF VANCOMYCIN: CPT | Performed by: INTERNAL MEDICINE

## 2017-11-27 PROCEDURE — 85025 COMPLETE CBC W/AUTO DIFF WBC: CPT | Performed by: INTERNAL MEDICINE

## 2017-11-27 PROCEDURE — 84550 ASSAY OF BLOOD/URIC ACID: CPT | Performed by: INTERNAL MEDICINE

## 2017-11-27 PROCEDURE — 85007 BL SMEAR W/DIFF WBC COUNT: CPT | Performed by: INTERNAL MEDICINE

## 2017-11-27 PROCEDURE — 85652 RBC SED RATE AUTOMATED: CPT | Performed by: INTERNAL MEDICINE

## 2020-12-05 NOTE — PLAN OF CARE
Problem: Patient Care Overview (Adult)  Goal: Plan of Care Review  Outcome: Ongoing (interventions implemented as appropriate)    10/09/17 0157   Coping/Psychosocial Response Interventions   Plan Of Care Reviewed With patient;son   Patient Care Overview   Progress progress toward functional goals as expected   Outcome Evaluation   Outcome Summary/Follow up Plan VSS. pt stated pain was considerably less than before the procedure. Slept comfortably with 1L NC.        Goal: Adult Individualization and Mutuality  Outcome: Outcome(s) achieved Date Met:  10/09/17    10/08/17 1717   Individualization   Patient Specific Preferences family at bedside, pills one at a time with large pills   Patient Specific Goals home at d/c, out of bed   Patient Specific Interventions enc ambulation, enc therapies, ens IS   Mutuality/Individual Preferences   What Anxieties, Fears or Concerns Do You Have About Your Health or Care? none   What Questions Do You Have About Your Health or Care? none   What Information Would Help Us Give You More Personalized Care? none       Goal: Discharge Needs Assessment  Outcome: Ongoing (interventions implemented as appropriate)    10/09/17 0157   Discharge Needs Assessment   Concerns To Be Addressed denies needs/concerns at this time   Readmission Within The Last 30 Days no previous admission in last 30 days   Discharge Disposition still a patient   Current Health   Anticipated Changes Related to Illness inability to care for self   Living Environment   Transportation Available car;family or friend will provide   Self-Care   Equipment Currently Used at Home wheelchair;walker, standard         Problem: Pain, Acute (Adult)  Goal: Acceptable Pain Control/Comfort Level  Outcome: Ongoing (interventions implemented as appropriate)    10/09/17 0157   Pain, Acute (Adult)   Acceptable Pain Control/Comfort Level making progress toward outcome         Problem: Skin Integrity Impairment, Risk/Actual (Adult)  Goal:  Identify Related Risk Factors and Signs and Symptoms  Outcome: Outcome(s) achieved Date Met:  10/09/17    10/09/17 0157   Skin Integrity Impairment, Risk/Actual   Skin Integrity Impairment, Risk/Actual: Related Risk Factors edema;surgery/procedure;infection/disease process   Signs and Symptoms (Skin Integrity Impairment) edema;inflammation       Goal: Skin Integrity/Wound Healing  Outcome: Ongoing (interventions implemented as appropriate)    10/09/17 0157   Skin Integrity Impairment, Risk/Actual (Adult)   Skin Integrity/Wound Healing making progress toward outcome            scheduled surgery - Jason reversal

## (undated) DEVICE — ANTIBACTERIAL UNDYED BRAIDED (POLYGLACTIN 910), SYNTHETIC ABSORBABLE SUTURE: Brand: COATED VICRYL

## (undated) DEVICE — SENSR O2 OXIMAX FNGR A/ 18IN NONSTR

## (undated) DEVICE — MEDI-VAC NON-CONDUCTIVE SUCTION TUBING: Brand: CARDINAL HEALTH

## (undated) DEVICE — UNDERCAST PADDING: Brand: DEROYAL

## (undated) DEVICE — GLV SURG TRIUMPH ORTHO W/ALOE PF LTX 8 STRL

## (undated) DEVICE — 1010 S-DRAPE TOWEL DRAPE 10/BX: Brand: STERI-DRAPE™

## (undated) DEVICE — 3M™ WARMING BLANKET, UPPER BODY, 10 PER CASE, 42268: Brand: BAIR HUGGER™

## (undated) DEVICE — GLV SURG SIGNATURE TOUCH PF LTX 8 STRL BX/50

## (undated) DEVICE — PK EXTREM LOWR 10

## (undated) DEVICE — INTENDED USE FOR SURGICAL MARKING ON INTACT SKIN, ALSO PROVIDES A PERMANENT METHOD OF IDENTIFYING OBJECTS IN THE OPERATING ROOM: Brand: WRITESITE® REGULAR TIP SKIN MARKER

## (undated) DEVICE — AMD ANTIMICROBIAL GAUZE SPONGES,12 PLY USP TYPE VII, 0.2% POLYHEXAMETHYLENE BIGUANIDE HCI (PHMB): Brand: CURITY

## (undated) DEVICE — BNDG ELAS ELITE V/CLOSE 4IN 5YD LF STRL

## (undated) DEVICE — AIRWY SZ11

## (undated) DEVICE — CONTAINER,SPECIMEN,OR STERILE,4OZ: Brand: MEDLINE

## (undated) DEVICE — DELFI MATCHING LIMB PROTECTION SLEEVES (MLPS) HELP PROTECT THE PATIENT’S LIMB FROM POSSIBLE WRINKLING, PINCHING AND SHEARING OF SKIN AND SOFT TISSUES OF THE LIMB.EACH DELFI MATCHING LIMB PROTECTION SLEEVE IS INTENDED FOR USE WITH A SPECIFIC DELFI TOURNIQUET CUFF. THIS SLEEVE IS SPECIFICALLY FOR THIGH.: Brand: MATCHING LIMB PROTECTION SLEEVES - VARI-FIT

## (undated) DEVICE — SUCTION CANISTER, 2500CC, RIGID: Brand: DEROYAL

## (undated) DEVICE — SUT MNCRYL 2/0 SH 27IN UD MCP417H

## (undated) DEVICE — OCCLUSIVE GAUZE STRIP,3% BISMUTH TRIBROMOPHENATE IN PETROLATUM BLEND: Brand: XEROFORM

## (undated) DEVICE — PK EXTREM UPPR 10

## (undated) DEVICE — HOLDER: Brand: DEROYAL

## (undated) DEVICE — MEDI-VAC YANKAUER SUCTION HANDLE W/BULBOUS TIP: Brand: CARDINAL HEALTH

## (undated) DEVICE — DRSNG SURG AQUACEL AG 9X10CM

## (undated) DEVICE — ST INF 2NDARY 20DRP VNT/NOVNT 30IN

## (undated) DEVICE — CVR HNDL LT SURG ACCSSRY BLU STRL

## (undated) DEVICE — INTENDED FOR TISSUE SEPARATION, AND OTHER PROCEDURES THAT REQUIRE A SHARP SURGICAL BLADE TO PUNCTURE OR CUT.: Brand: BARD-PARKER ® SAFETYLOCK CARBON RIB-BACK BLADES

## (undated) DEVICE — SPNG GZ WOVN 4X4IN 12PLY 10/BX STRL

## (undated) DEVICE — INTENDED FOR TISSUE SEPARATION, AND OTHER PROCEDURES THAT REQUIRE A SHARP SURGICAL BLADE TO PUNCTURE OR CUT.: Brand: BARD-PARKER ® STAINLESS STEEL BLADES

## (undated) DEVICE — SHEET,DRAPE,53X77,STERILE: Brand: MEDLINE

## (undated) DEVICE — SOL LR 1000ML

## (undated) DEVICE — PAD ARMBRD SURG CONVOL 7.5X20X2IN

## (undated) DEVICE — 3M™ TEGADERM™ CHG DRESSING 25/CARTON 4 CARTONS/CASE 1658: Brand: TEGADERM™

## (undated) DEVICE — LARYNG GLIDESCOPE COBALT/RANGER GVL3ST

## (undated) DEVICE — PLUS HANDPIECE WITH MULTIPLE-ORIFICE TIP WITH SOFT CONE SPLASH SHIELD: Brand: SURGILAV

## (undated) DEVICE — 3M™ STERI-DRAPE™ INSTRUMENT POUCH 1018: Brand: STERI-DRAPE™